# Patient Record
Sex: FEMALE | Race: WHITE | NOT HISPANIC OR LATINO | Employment: OTHER | URBAN - METROPOLITAN AREA
[De-identification: names, ages, dates, MRNs, and addresses within clinical notes are randomized per-mention and may not be internally consistent; named-entity substitution may affect disease eponyms.]

---

## 2019-04-11 ENCOUNTER — HOSPITAL ENCOUNTER (INPATIENT)
Facility: MEDICAL CENTER | Age: 79
LOS: 8 days | DRG: 813 | End: 2019-04-19
Attending: EMERGENCY MEDICINE | Admitting: HOSPITALIST
Payer: MEDICARE

## 2019-04-11 ENCOUNTER — APPOINTMENT (OUTPATIENT)
Dept: RADIOLOGY | Facility: MEDICAL CENTER | Age: 79
DRG: 813 | End: 2019-04-11
Attending: EMERGENCY MEDICINE
Payer: MEDICARE

## 2019-04-11 DIAGNOSIS — N17.9 AKI (ACUTE KIDNEY INJURY) (HCC): ICD-10-CM

## 2019-04-11 DIAGNOSIS — Z79.899 HIGH RISK MEDICATION USE: ICD-10-CM

## 2019-04-11 DIAGNOSIS — I48.91 ATRIAL FIBRILLATION WITH RAPID VENTRICULAR RESPONSE (HCC): ICD-10-CM

## 2019-04-11 DIAGNOSIS — K92.2 ACUTE GI BLEEDING: ICD-10-CM

## 2019-04-11 DIAGNOSIS — E87.6 HYPOKALEMIA: ICD-10-CM

## 2019-04-11 PROBLEM — J44.9 COPD (CHRONIC OBSTRUCTIVE PULMONARY DISEASE) (HCC): Status: ACTIVE | Noted: 2019-04-11

## 2019-04-11 PROBLEM — R79.89 ELEVATED TROPONIN: Status: ACTIVE | Noted: 2019-04-11

## 2019-04-11 PROBLEM — I10 HTN (HYPERTENSION): Status: ACTIVE | Noted: 2019-04-11

## 2019-04-11 PROBLEM — T78.40XA ALLERGIC REACTION: Status: ACTIVE | Noted: 2019-04-11

## 2019-04-11 PROBLEM — E87.5 HYPERKALEMIA: Status: ACTIVE | Noted: 2019-04-11

## 2019-04-11 PROBLEM — E03.9 HYPOTHYROIDISM: Status: ACTIVE | Noted: 2019-04-11

## 2019-04-11 PROBLEM — E78.5 HLD (HYPERLIPIDEMIA): Status: ACTIVE | Noted: 2019-04-11

## 2019-04-11 LAB
ALBUMIN SERPL BCP-MCNC: 3.6 G/DL (ref 3.2–4.9)
ALBUMIN/GLOB SERPL: 1.2 G/DL
ALP SERPL-CCNC: 67 U/L (ref 30–99)
ALT SERPL-CCNC: 16 U/L (ref 2–50)
ANION GAP SERPL CALC-SCNC: 9 MMOL/L (ref 0–11.9)
ANISOCYTOSIS BLD QL SMEAR: ABNORMAL
AST SERPL-CCNC: 19 U/L (ref 12–45)
BASOPHILS # BLD AUTO: 0 % (ref 0–1.8)
BASOPHILS # BLD: 0 K/UL (ref 0–0.12)
BILIRUB SERPL-MCNC: 0.7 MG/DL (ref 0.1–1.5)
BUN SERPL-MCNC: 98 MG/DL (ref 8–22)
CALCIUM SERPL-MCNC: 8.3 MG/DL (ref 8.5–10.5)
CHLORIDE SERPL-SCNC: 104 MMOL/L (ref 96–112)
CO2 SERPL-SCNC: 21 MMOL/L (ref 20–33)
CREAT SERPL-MCNC: 2.01 MG/DL (ref 0.5–1.4)
EKG IMPRESSION: NORMAL
EOSINOPHIL # BLD AUTO: 0 K/UL (ref 0–0.51)
EOSINOPHIL NFR BLD: 0 % (ref 0–6.9)
ERYTHROCYTE [DISTWIDTH] IN BLOOD BY AUTOMATED COUNT: 63.6 FL (ref 35.9–50)
GLOBULIN SER CALC-MCNC: 2.9 G/DL (ref 1.9–3.5)
GLUCOSE SERPL-MCNC: 115 MG/DL (ref 65–99)
HCT VFR BLD AUTO: 29.1 % (ref 37–47)
HGB BLD-MCNC: 8.9 G/DL (ref 12–16)
INR PPP: 2.23 (ref 0.87–1.13)
LACTATE BLD-SCNC: 2.2 MMOL/L (ref 0.5–2)
LYMPHOCYTES # BLD AUTO: 1.06 K/UL (ref 1–4.8)
LYMPHOCYTES NFR BLD: 8.8 % (ref 22–41)
MACROCYTES BLD QL SMEAR: ABNORMAL
MANUAL DIFF BLD: NORMAL
MCH RBC QN AUTO: 30.4 PG (ref 27–33)
MCHC RBC AUTO-ENTMCNC: 30.6 G/DL (ref 33.6–35)
MCV RBC AUTO: 99.3 FL (ref 81.4–97.8)
METAMYELOCYTES NFR BLD MANUAL: 0.9 %
MICROCYTES BLD QL SMEAR: ABNORMAL
MONOCYTES # BLD AUTO: 0.53 K/UL (ref 0–0.85)
MONOCYTES NFR BLD AUTO: 4.4 % (ref 0–13.4)
MORPHOLOGY BLD-IMP: NORMAL
MYELOCYTES NFR BLD MANUAL: 0.9 %
NEUTROPHILS # BLD AUTO: 10.29 K/UL (ref 2–7.15)
NEUTROPHILS NFR BLD: 85 % (ref 44–72)
NRBC # BLD AUTO: 0.16 K/UL
NRBC BLD-RTO: 1.3 /100 WBC
OVALOCYTES BLD QL SMEAR: NORMAL
PLATELET # BLD AUTO: 301 K/UL (ref 164–446)
PLATELET BLD QL SMEAR: NORMAL
PMV BLD AUTO: 10.7 FL (ref 9–12.9)
POIKILOCYTOSIS BLD QL SMEAR: NORMAL
POLYCHROMASIA BLD QL SMEAR: NORMAL
POTASSIUM SERPL-SCNC: 6.2 MMOL/L (ref 3.6–5.5)
PROT SERPL-MCNC: 6.5 G/DL (ref 6–8.2)
PROTHROMBIN TIME: 24.8 SEC (ref 12–14.6)
RBC # BLD AUTO: 2.93 M/UL (ref 4.2–5.4)
RBC BLD AUTO: PRESENT
SODIUM SERPL-SCNC: 134 MMOL/L (ref 135–145)
TROPONIN I SERPL-MCNC: 1 NG/ML (ref 0–0.04)
TROPONIN I SERPL-MCNC: 1.06 NG/ML (ref 0–0.04)
WBC # BLD AUTO: 12.1 K/UL (ref 4.8–10.8)

## 2019-04-11 PROCEDURE — 93005 ELECTROCARDIOGRAM TRACING: CPT | Performed by: EMERGENCY MEDICINE

## 2019-04-11 PROCEDURE — 700105 HCHG RX REV CODE 258: Performed by: EMERGENCY MEDICINE

## 2019-04-11 PROCEDURE — 85007 BL SMEAR W/DIFF WBC COUNT: CPT

## 2019-04-11 PROCEDURE — 99285 EMERGENCY DEPT VISIT HI MDM: CPT

## 2019-04-11 PROCEDURE — 94760 N-INVAS EAR/PLS OXIMETRY 1: CPT

## 2019-04-11 PROCEDURE — 80053 COMPREHEN METABOLIC PANEL: CPT

## 2019-04-11 PROCEDURE — 96365 THER/PROPH/DIAG IV INF INIT: CPT

## 2019-04-11 PROCEDURE — 700105 HCHG RX REV CODE 258: Performed by: HOSPITALIST

## 2019-04-11 PROCEDURE — 700101 HCHG RX REV CODE 250: Performed by: HOSPITALIST

## 2019-04-11 PROCEDURE — 700111 HCHG RX REV CODE 636 W/ 250 OVERRIDE (IP): Performed by: HOSPITALIST

## 2019-04-11 PROCEDURE — 83605 ASSAY OF LACTIC ACID: CPT

## 2019-04-11 PROCEDURE — 85610 PROTHROMBIN TIME: CPT

## 2019-04-11 PROCEDURE — 770020 HCHG ROOM/CARE - TELE (206)

## 2019-04-11 PROCEDURE — 80048 BASIC METABOLIC PNL TOTAL CA: CPT

## 2019-04-11 PROCEDURE — 85027 COMPLETE CBC AUTOMATED: CPT

## 2019-04-11 PROCEDURE — 93005 ELECTROCARDIOGRAM TRACING: CPT

## 2019-04-11 PROCEDURE — 700102 HCHG RX REV CODE 250 W/ 637 OVERRIDE(OP): Performed by: HOSPITALIST

## 2019-04-11 PROCEDURE — 36415 COLL VENOUS BLD VENIPUNCTURE: CPT

## 2019-04-11 PROCEDURE — 84484 ASSAY OF TROPONIN QUANT: CPT

## 2019-04-11 PROCEDURE — 700111 HCHG RX REV CODE 636 W/ 250 OVERRIDE (IP): Performed by: EMERGENCY MEDICINE

## 2019-04-11 PROCEDURE — 304561 HCHG STAT O2

## 2019-04-11 PROCEDURE — 96366 THER/PROPH/DIAG IV INF ADDON: CPT

## 2019-04-11 PROCEDURE — 99223 1ST HOSP IP/OBS HIGH 75: CPT | Mod: AI | Performed by: HOSPITALIST

## 2019-04-11 PROCEDURE — 74176 CT ABD & PELVIS W/O CONTRAST: CPT

## 2019-04-11 PROCEDURE — A9270 NON-COVERED ITEM OR SERVICE: HCPCS | Performed by: HOSPITALIST

## 2019-04-11 PROCEDURE — C9113 INJ PANTOPRAZOLE SODIUM, VIA: HCPCS | Performed by: EMERGENCY MEDICINE

## 2019-04-11 PROCEDURE — C9113 INJ PANTOPRAZOLE SODIUM, VIA: HCPCS | Performed by: HOSPITALIST

## 2019-04-11 RX ORDER — POTASSIUM CHLORIDE 20 MEQ/1
40 TABLET, EXTENDED RELEASE ORAL 2 TIMES DAILY
Status: ON HOLD | COMMUNITY
End: 2019-04-16

## 2019-04-11 RX ORDER — CALCIUM GLUCONATE 94 MG/ML
1 INJECTION, SOLUTION INTRAVENOUS ONCE
Status: DISCONTINUED | OUTPATIENT
Start: 2019-04-11 | End: 2019-04-11

## 2019-04-11 RX ORDER — FUROSEMIDE 20 MG/1
20 TABLET ORAL
Status: ON HOLD | COMMUNITY
End: 2019-04-16

## 2019-04-11 RX ORDER — FUROSEMIDE 10 MG/ML
10 INJECTION INTRAMUSCULAR; INTRAVENOUS ONCE
Status: COMPLETED | OUTPATIENT
Start: 2019-04-11 | End: 2019-04-11

## 2019-04-11 RX ORDER — POTASSIUM CHLORIDE 1.5 G/1.58G
10 POWDER, FOR SOLUTION ORAL PRN
COMMUNITY
End: 2019-04-11 | Stop reason: CLARIF

## 2019-04-11 RX ORDER — BUPROPION HYDROCHLORIDE 100 MG/1
200 TABLET ORAL
COMMUNITY
End: 2019-04-11 | Stop reason: CLARIF

## 2019-04-11 RX ORDER — OMEPRAZOLE 20 MG/1
20 CAPSULE, DELAYED RELEASE ORAL DAILY
COMMUNITY

## 2019-04-11 RX ORDER — DILTIAZEM HYDROCHLORIDE 60 MG/1
60 TABLET, FILM COATED ORAL EVERY 8 HOURS
Status: ON HOLD | COMMUNITY
End: 2019-04-16

## 2019-04-11 RX ORDER — MAGNESIUM OXIDE 400 MG/1
400 TABLET ORAL DAILY
Status: ON HOLD | COMMUNITY
End: 2019-04-16

## 2019-04-11 RX ORDER — DILTIAZEM HYDROCHLORIDE 5 MG/ML
0.25 INJECTION INTRAVENOUS ONCE
Status: COMPLETED | OUTPATIENT
Start: 2019-04-11 | End: 2019-04-11

## 2019-04-11 RX ORDER — BUPROPION HYDROCHLORIDE 200 MG/1
200 TABLET, EXTENDED RELEASE ORAL EVERY EVENING
COMMUNITY

## 2019-04-11 RX ORDER — LISINOPRIL 2.5 MG/1
2.5 TABLET ORAL DAILY
Status: ON HOLD | COMMUNITY
End: 2019-04-19

## 2019-04-11 RX ORDER — ALBUTEROL SULFATE 90 UG/1
2 AEROSOL, METERED RESPIRATORY (INHALATION) EVERY 4 HOURS PRN
Status: DISCONTINUED | OUTPATIENT
Start: 2019-04-11 | End: 2019-04-19 | Stop reason: HOSPADM

## 2019-04-11 RX ORDER — BUPROPION HYDROCHLORIDE 100 MG/1
200 TABLET, EXTENDED RELEASE ORAL 2 TIMES DAILY
Status: DISCONTINUED | OUTPATIENT
Start: 2019-04-11 | End: 2019-04-19 | Stop reason: HOSPADM

## 2019-04-11 RX ORDER — ALBUTEROL SULFATE 90 UG/1
2 AEROSOL, METERED RESPIRATORY (INHALATION) EVERY 6 HOURS PRN
COMMUNITY

## 2019-04-11 RX ORDER — LEVALBUTEROL INHALATION SOLUTION 0.63 MG/3ML
0.63 SOLUTION RESPIRATORY (INHALATION)
Status: DISCONTINUED | OUTPATIENT
Start: 2019-04-11 | End: 2019-04-19 | Stop reason: HOSPADM

## 2019-04-11 RX ORDER — HYDROCHLOROTHIAZIDE 25 MG/1
25 TABLET ORAL DAILY
Status: ON HOLD | COMMUNITY
End: 2019-04-16

## 2019-04-11 RX ORDER — ONDANSETRON 4 MG/1
4 TABLET, ORALLY DISINTEGRATING ORAL EVERY 4 HOURS PRN
Status: DISCONTINUED | OUTPATIENT
Start: 2019-04-11 | End: 2019-04-19 | Stop reason: HOSPADM

## 2019-04-11 RX ORDER — ONDANSETRON 2 MG/ML
4 INJECTION INTRAMUSCULAR; INTRAVENOUS EVERY 4 HOURS PRN
Status: DISCONTINUED | OUTPATIENT
Start: 2019-04-11 | End: 2019-04-19 | Stop reason: HOSPADM

## 2019-04-11 RX ORDER — IPRATROPIUM BROMIDE AND ALBUTEROL SULFATE 2.5; .5 MG/3ML; MG/3ML
3 SOLUTION RESPIRATORY (INHALATION) 4 TIMES DAILY
COMMUNITY

## 2019-04-11 RX ORDER — BUDESONIDE AND FORMOTEROL FUMARATE DIHYDRATE 80; 4.5 UG/1; UG/1
1 AEROSOL RESPIRATORY (INHALATION) 2 TIMES DAILY
Status: DISCONTINUED | OUTPATIENT
Start: 2019-04-11 | End: 2019-04-19 | Stop reason: HOSPADM

## 2019-04-11 RX ORDER — RANITIDINE 150 MG/1
150 TABLET ORAL 2 TIMES DAILY PRN
Status: ON HOLD | COMMUNITY
End: 2019-04-16

## 2019-04-11 RX ORDER — ATORVASTATIN CALCIUM 20 MG/1
20 TABLET, FILM COATED ORAL NIGHTLY
COMMUNITY

## 2019-04-11 RX ORDER — DOCUSATE SODIUM 100 MG/1
100 CAPSULE, LIQUID FILLED ORAL 2 TIMES DAILY PRN
COMMUNITY

## 2019-04-11 RX ORDER — SODIUM CHLORIDE, SODIUM LACTATE, POTASSIUM CHLORIDE, CALCIUM CHLORIDE 600; 310; 30; 20 MG/100ML; MG/100ML; MG/100ML; MG/100ML
INJECTION, SOLUTION INTRAVENOUS CONTINUOUS
Status: DISCONTINUED | OUTPATIENT
Start: 2019-04-11 | End: 2019-04-12

## 2019-04-11 RX ORDER — BUDESONIDE AND FORMOTEROL FUMARATE DIHYDRATE 80; 4.5 UG/1; UG/1
1 AEROSOL RESPIRATORY (INHALATION) 2 TIMES DAILY
COMMUNITY

## 2019-04-11 RX ORDER — DEXTROSE MONOHYDRATE 25 G/50ML
50 INJECTION, SOLUTION INTRAVENOUS ONCE
Status: COMPLETED | OUTPATIENT
Start: 2019-04-11 | End: 2019-04-11

## 2019-04-11 RX ORDER — BISACODYL 10 MG
10 SUPPOSITORY, RECTAL RECTAL
Status: DISCONTINUED | OUTPATIENT
Start: 2019-04-11 | End: 2019-04-19 | Stop reason: HOSPADM

## 2019-04-11 RX ORDER — HYDROCHLOROTHIAZIDE 25 MG/1
25 TABLET ORAL DAILY
Status: DISCONTINUED | OUTPATIENT
Start: 2019-04-12 | End: 2019-04-12

## 2019-04-11 RX ORDER — AMOXICILLIN 250 MG
2 CAPSULE ORAL 2 TIMES DAILY
Status: DISCONTINUED | OUTPATIENT
Start: 2019-04-11 | End: 2019-04-19 | Stop reason: HOSPADM

## 2019-04-11 RX ORDER — LEVOTHYROXINE SODIUM 0.05 MG/1
50 TABLET ORAL
Status: DISCONTINUED | OUTPATIENT
Start: 2019-04-12 | End: 2019-04-19 | Stop reason: HOSPADM

## 2019-04-11 RX ORDER — POLYETHYLENE GLYCOL 3350 17 G/17G
17 POWDER, FOR SOLUTION ORAL
COMMUNITY

## 2019-04-11 RX ORDER — POLYETHYLENE GLYCOL 3350 17 G/17G
1 POWDER, FOR SOLUTION ORAL
Status: DISCONTINUED | OUTPATIENT
Start: 2019-04-11 | End: 2019-04-19 | Stop reason: HOSPADM

## 2019-04-11 RX ORDER — ATORVASTATIN CALCIUM 20 MG/1
20 TABLET, FILM COATED ORAL NIGHTLY
Status: DISCONTINUED | OUTPATIENT
Start: 2019-04-11 | End: 2019-04-19 | Stop reason: HOSPADM

## 2019-04-11 RX ORDER — LEVOTHYROXINE SODIUM 0.05 MG/1
50 TABLET ORAL
COMMUNITY

## 2019-04-11 RX ADMIN — FUROSEMIDE 10 MG: 10 INJECTION, SOLUTION INTRAMUSCULAR; INTRAVENOUS at 21:11

## 2019-04-11 RX ADMIN — DILTIAZEM HYDROCHLORIDE 21.55 MG: 5 INJECTION INTRAVENOUS at 22:24

## 2019-04-11 RX ADMIN — SODIUM CHLORIDE, POTASSIUM CHLORIDE, SODIUM LACTATE AND CALCIUM CHLORIDE: 600; 310; 30; 20 INJECTION, SOLUTION INTRAVENOUS at 21:12

## 2019-04-11 RX ADMIN — SODIUM CHLORIDE 8 MG/HR: 9 INJECTION, SOLUTION INTRAVENOUS at 21:33

## 2019-04-11 RX ADMIN — SODIUM CHLORIDE 80 MG: 9 INJECTION, SOLUTION INTRAVENOUS at 21:14

## 2019-04-11 RX ADMIN — BUPROPION HYDROCHLORIDE 200 MG: 100 TABLET, FILM COATED, EXTENDED RELEASE ORAL at 22:04

## 2019-04-11 RX ADMIN — INSULIN HUMAN 5 UNITS: 100 INJECTION, SOLUTION PARENTERAL at 21:11

## 2019-04-11 RX ADMIN — SODIUM CHLORIDE 8 MG/HR: 9 INJECTION, SOLUTION INTRAVENOUS at 19:00

## 2019-04-11 RX ADMIN — CALCIUM GLUCONATE 1 G: 98 INJECTION, SOLUTION INTRAVENOUS at 21:14

## 2019-04-11 RX ADMIN — DEXTROSE MONOHYDRATE 50 ML: 25 INJECTION, SOLUTION INTRAVENOUS at 21:12

## 2019-04-11 RX ADMIN — ATORVASTATIN CALCIUM 20 MG: 20 TABLET, FILM COATED ORAL at 22:04

## 2019-04-11 RX ADMIN — BUDESONIDE AND FORMOTEROL FUMARATE DIHYDRATE 1 PUFF: 80; 4.5 AEROSOL RESPIRATORY (INHALATION) at 22:04

## 2019-04-11 ASSESSMENT — LIFESTYLE VARIABLES
DO YOU DRINK ALCOHOL: NO
EVER_SMOKED: YES

## 2019-04-11 ASSESSMENT — ENCOUNTER SYMPTOMS
DEPRESSION: 0
EYES NEGATIVE: 1
SPUTUM PRODUCTION: 0
NAUSEA: 0
VOMITING: 0
DIZZINESS: 0
FEVER: 0
MUSCULOSKELETAL NEGATIVE: 1
ORTHOPNEA: 1
ABDOMINAL PAIN: 0
TINGLING: 0
HEARTBURN: 0
CHILLS: 0
PSYCHIATRIC NEGATIVE: 1
FOCAL WEAKNESS: 0
PHOTOPHOBIA: 0
CONSTIPATION: 0
WHEEZING: 0
MYALGIAS: 0
DIARRHEA: 0
SHORTNESS OF BREATH: 0
HEMOPTYSIS: 0
PALPITATIONS: 0
HEADACHES: 0
SORE THROAT: 0
BLOOD IN STOOL: 0
NAUSEA: 1
SHORTNESS OF BREATH: 1
WEAKNESS: 1
COUGH: 0

## 2019-04-11 ASSESSMENT — COPD QUESTIONNAIRES
DO YOU EVER COUGH UP ANY MUCUS OR PHLEGM?: NO/ONLY WITH OCCASIONAL COLDS OR INFECTIONS
COPD SCREENING SCORE: 4
HAVE YOU SMOKED AT LEAST 100 CIGARETTES IN YOUR ENTIRE LIFE: YES
DURING THE PAST 4 WEEKS HOW MUCH DID YOU FEEL SHORT OF BREATH: NONE/LITTLE OF THE TIME

## 2019-04-12 ENCOUNTER — ANESTHESIA (OUTPATIENT)
Dept: SURGERY | Facility: MEDICAL CENTER | Age: 79
DRG: 813 | End: 2019-04-12
Payer: MEDICARE

## 2019-04-12 ENCOUNTER — APPOINTMENT (OUTPATIENT)
Dept: CARDIOLOGY | Facility: MEDICAL CENTER | Age: 79
DRG: 813 | End: 2019-04-12
Attending: INTERNAL MEDICINE
Payer: MEDICARE

## 2019-04-12 ENCOUNTER — ANESTHESIA EVENT (OUTPATIENT)
Dept: SURGERY | Facility: MEDICAL CENTER | Age: 79
DRG: 813 | End: 2019-04-12
Payer: MEDICARE

## 2019-04-12 ENCOUNTER — APPOINTMENT (OUTPATIENT)
Dept: RADIOLOGY | Facility: MEDICAL CENTER | Age: 79
DRG: 813 | End: 2019-04-12
Attending: INTERNAL MEDICINE
Payer: MEDICARE

## 2019-04-12 PROBLEM — D62 ACUTE BLOOD LOSS ANEMIA: Status: ACTIVE | Noted: 2019-04-12

## 2019-04-12 PROBLEM — D68.32 HEMORRHAGIC DISORDER DUE TO EXTRINSIC CIRCULATING ANTICOAGULANTS (HCC): Status: ACTIVE | Noted: 2019-04-12

## 2019-04-12 PROBLEM — E66.9 OBESITY: Status: ACTIVE | Noted: 2019-04-12

## 2019-04-12 LAB
ABO GROUP BLD: NORMAL
ANION GAP SERPL CALC-SCNC: 10 MMOL/L (ref 0–11.9)
BARCODED ABORH UBTYP: 6200
BARCODED ABORH UBTYP: 8400
BARCODED PRD CODE UBPRD: NORMAL
BARCODED PRD CODE UBPRD: NORMAL
BARCODED UNIT NUM UBUNT: NORMAL
BARCODED UNIT NUM UBUNT: NORMAL
BUN SERPL-MCNC: 93 MG/DL (ref 8–22)
CALCIUM SERPL-MCNC: 8.7 MG/DL (ref 8.5–10.5)
CHLORIDE SERPL-SCNC: 105 MMOL/L (ref 96–112)
CHLORIDE UR-SCNC: 42 MMOL/L
CO2 SERPL-SCNC: 20 MMOL/L (ref 20–33)
COMPONENT FT 8504FT: NORMAL
COMPONENT FT 8504FT: NORMAL
CREAT SERPL-MCNC: 2.05 MG/DL (ref 0.5–1.4)
CREAT UR-MCNC: 70.3 MG/DL
DIGOXIN SERPL-MCNC: 1.1 NG/ML (ref 0.8–2)
GLUCOSE SERPL-MCNC: 106 MG/DL (ref 65–99)
HGB BLD-MCNC: 7.7 G/DL (ref 12–16)
HGB BLD-MCNC: 8.1 G/DL (ref 12–16)
HGB BLD-MCNC: 8.3 G/DL (ref 12–16)
LV EJECT FRACT  99904: 25
LV EJECT FRACT MOD 2C 99903: 33.03
LV EJECT FRACT MOD 4C 99902: 43.32
LV EJECT FRACT MOD BP 99901: 36.79
POTASSIUM SERPL-SCNC: 5 MMOL/L (ref 3.6–5.5)
POTASSIUM SERPL-SCNC: 5.5 MMOL/L (ref 3.6–5.5)
POTASSIUM SERPL-SCNC: 5.6 MMOL/L (ref 3.6–5.5)
POTASSIUM UR-SCNC: 70.1 MMOL/L
PRODUCT TYPE UPROD: NORMAL
PRODUCT TYPE UPROD: NORMAL
PROT UR-MCNC: 14 MG/DL (ref 0–15)
RH BLD: NORMAL
SODIUM SERPL-SCNC: 135 MMOL/L (ref 135–145)
SODIUM UR-SCNC: 15 MMOL/L
TROPONIN I SERPL-MCNC: 0.97 NG/ML (ref 0–0.04)
TSH SERPL DL<=0.005 MIU/L-ACNC: 2.89 UIU/ML (ref 0.38–5.33)
UNIT STATUS USTAT: NORMAL
UNIT STATUS USTAT: NORMAL

## 2019-04-12 PROCEDURE — 770020 HCHG ROOM/CARE - TELE (206)

## 2019-04-12 PROCEDURE — 700111 HCHG RX REV CODE 636 W/ 250 OVERRIDE (IP): Performed by: INTERNAL MEDICINE

## 2019-04-12 PROCEDURE — 93306 TTE W/DOPPLER COMPLETE: CPT

## 2019-04-12 PROCEDURE — 700102 HCHG RX REV CODE 250 W/ 637 OVERRIDE(OP): Performed by: HOSPITALIST

## 2019-04-12 PROCEDURE — 160029 HCHG SURGERY MINUTES - 1ST 30 MINS LEVEL 4: Performed by: INTERNAL MEDICINE

## 2019-04-12 PROCEDURE — C9113 INJ PANTOPRAZOLE SODIUM, VIA: HCPCS | Performed by: HOSPITALIST

## 2019-04-12 PROCEDURE — 500066 HCHG BITE BLOCK, ECT: Performed by: INTERNAL MEDICINE

## 2019-04-12 PROCEDURE — 0DJD8ZZ INSPECTION OF LOWER INTESTINAL TRACT, VIA NATURAL OR ARTIFICIAL OPENING ENDOSCOPIC: ICD-10-PCS | Performed by: INTERNAL MEDICINE

## 2019-04-12 PROCEDURE — 700101 HCHG RX REV CODE 250: Performed by: ANESTHESIOLOGY

## 2019-04-12 PROCEDURE — 84443 ASSAY THYROID STIM HORMONE: CPT

## 2019-04-12 PROCEDURE — 160035 HCHG PACU - 1ST 60 MINS PHASE I: Performed by: INTERNAL MEDICINE

## 2019-04-12 PROCEDURE — 160048 HCHG OR STATISTICAL LEVEL 1-5: Performed by: INTERNAL MEDICINE

## 2019-04-12 PROCEDURE — 160036 HCHG PACU - EA ADDL 30 MINS PHASE I: Performed by: INTERNAL MEDICINE

## 2019-04-12 PROCEDURE — A9270 NON-COVERED ITEM OR SERVICE: HCPCS | Performed by: NURSE PRACTITIONER

## 2019-04-12 PROCEDURE — 700111 HCHG RX REV CODE 636 W/ 250 OVERRIDE (IP): Performed by: HOSPITALIST

## 2019-04-12 PROCEDURE — 86901 BLOOD TYPING SEROLOGIC RH(D): CPT

## 2019-04-12 PROCEDURE — 99222 1ST HOSP IP/OBS MODERATE 55: CPT | Performed by: INTERNAL MEDICINE

## 2019-04-12 PROCEDURE — 84484 ASSAY OF TROPONIN QUANT: CPT

## 2019-04-12 PROCEDURE — 160009 HCHG ANES TIME/MIN: Performed by: INTERNAL MEDICINE

## 2019-04-12 PROCEDURE — 30233L1 TRANSFUSION OF NONAUTOLOGOUS FRESH PLASMA INTO PERIPHERAL VEIN, PERCUTANEOUS APPROACH: ICD-10-PCS | Performed by: INTERNAL MEDICINE

## 2019-04-12 PROCEDURE — 36430 TRANSFUSION BLD/BLD COMPNT: CPT

## 2019-04-12 PROCEDURE — 80162 ASSAY OF DIGOXIN TOTAL: CPT

## 2019-04-12 PROCEDURE — 306589 SLEEVE,VASO CALF LARGE: Performed by: HOSPITALIST

## 2019-04-12 PROCEDURE — 700105 HCHG RX REV CODE 258: Performed by: INTERNAL MEDICINE

## 2019-04-12 PROCEDURE — 85018 HEMOGLOBIN: CPT | Mod: 91

## 2019-04-12 PROCEDURE — C9113 INJ PANTOPRAZOLE SODIUM, VIA: HCPCS | Performed by: INTERNAL MEDICINE

## 2019-04-12 PROCEDURE — 82570 ASSAY OF URINE CREATININE: CPT

## 2019-04-12 PROCEDURE — 88305 TISSUE EXAM BY PATHOLOGIST: CPT

## 2019-04-12 PROCEDURE — 99233 SBSQ HOSP IP/OBS HIGH 50: CPT | Performed by: INTERNAL MEDICINE

## 2019-04-12 PROCEDURE — 700105 HCHG RX REV CODE 258

## 2019-04-12 PROCEDURE — 36415 COLL VENOUS BLD VENIPUNCTURE: CPT

## 2019-04-12 PROCEDURE — 0DB68ZX EXCISION OF STOMACH, VIA NATURAL OR ARTIFICIAL OPENING ENDOSCOPIC, DIAGNOSTIC: ICD-10-PCS | Performed by: INTERNAL MEDICINE

## 2019-04-12 PROCEDURE — 84132 ASSAY OF SERUM POTASSIUM: CPT

## 2019-04-12 PROCEDURE — 84156 ASSAY OF PROTEIN URINE: CPT

## 2019-04-12 PROCEDURE — 700101 HCHG RX REV CODE 250: Performed by: HOSPITALIST

## 2019-04-12 PROCEDURE — 82436 ASSAY OF URINE CHLORIDE: CPT

## 2019-04-12 PROCEDURE — 71045 X-RAY EXAM CHEST 1 VIEW: CPT

## 2019-04-12 PROCEDURE — 700105 HCHG RX REV CODE 258: Performed by: HOSPITALIST

## 2019-04-12 PROCEDURE — 160002 HCHG RECOVERY MINUTES (STAT): Performed by: INTERNAL MEDICINE

## 2019-04-12 PROCEDURE — 86900 BLOOD TYPING SEROLOGIC ABO: CPT

## 2019-04-12 PROCEDURE — 93306 TTE W/DOPPLER COMPLETE: CPT | Mod: 26 | Performed by: INTERNAL MEDICINE

## 2019-04-12 PROCEDURE — 0DB98ZX EXCISION OF DUODENUM, VIA NATURAL OR ARTIFICIAL OPENING ENDOSCOPIC, DIAGNOSTIC: ICD-10-PCS | Performed by: INTERNAL MEDICINE

## 2019-04-12 PROCEDURE — P9017 PLASMA 1 DONOR FRZ W/IN 8 HR: HCPCS | Mod: 91

## 2019-04-12 PROCEDURE — 700111 HCHG RX REV CODE 636 W/ 250 OVERRIDE (IP): Performed by: ANESTHESIOLOGY

## 2019-04-12 PROCEDURE — A9270 NON-COVERED ITEM OR SERVICE: HCPCS | Performed by: HOSPITALIST

## 2019-04-12 PROCEDURE — 84300 ASSAY OF URINE SODIUM: CPT

## 2019-04-12 PROCEDURE — 84133 ASSAY OF URINE POTASSIUM: CPT

## 2019-04-12 PROCEDURE — 700102 HCHG RX REV CODE 250 W/ 637 OVERRIDE(OP): Performed by: NURSE PRACTITIONER

## 2019-04-12 PROCEDURE — 88312 SPECIAL STAINS GROUP 1: CPT

## 2019-04-12 PROCEDURE — 94664 DEMO&/EVAL PT USE INHALER: CPT

## 2019-04-12 RX ORDER — HYDRALAZINE HYDROCHLORIDE 20 MG/ML
10 INJECTION INTRAMUSCULAR; INTRAVENOUS EVERY 6 HOURS PRN
Status: DISCONTINUED | OUTPATIENT
Start: 2019-04-12 | End: 2019-04-19 | Stop reason: HOSPADM

## 2019-04-12 RX ORDER — HYDROMORPHONE HYDROCHLORIDE 1 MG/ML
0.2 INJECTION, SOLUTION INTRAMUSCULAR; INTRAVENOUS; SUBCUTANEOUS
Status: DISCONTINUED | OUTPATIENT
Start: 2019-04-12 | End: 2019-04-12 | Stop reason: HOSPADM

## 2019-04-12 RX ORDER — DIGOXIN 0.25 MG/ML
250 INJECTION INTRAMUSCULAR; INTRAVENOUS
Status: COMPLETED | OUTPATIENT
Start: 2019-04-12 | End: 2019-04-12

## 2019-04-12 RX ORDER — OXYCODONE HCL 5 MG/5 ML
10 SOLUTION, ORAL ORAL
Status: DISCONTINUED | OUTPATIENT
Start: 2019-04-12 | End: 2019-04-12 | Stop reason: HOSPADM

## 2019-04-12 RX ORDER — DIGOXIN 125 MCG
250 TABLET ORAL EVERY 6 HOURS
Status: DISCONTINUED | OUTPATIENT
Start: 2019-04-12 | End: 2019-04-12

## 2019-04-12 RX ORDER — DIPHENHYDRAMINE HYDROCHLORIDE 50 MG/ML
12.5 INJECTION INTRAMUSCULAR; INTRAVENOUS
Status: DISCONTINUED | OUTPATIENT
Start: 2019-04-12 | End: 2019-04-12

## 2019-04-12 RX ORDER — SODIUM CHLORIDE, SODIUM GLUCONATE, SODIUM ACETATE, POTASSIUM CHLORIDE AND MAGNESIUM CHLORIDE 526; 502; 368; 37; 30 MG/100ML; MG/100ML; MG/100ML; MG/100ML; MG/100ML
500 INJECTION, SOLUTION INTRAVENOUS CONTINUOUS
Status: DISCONTINUED | OUTPATIENT
Start: 2019-04-12 | End: 2019-04-12

## 2019-04-12 RX ORDER — ETOMIDATE 2 MG/ML
INJECTION INTRAVENOUS PRN
Status: DISCONTINUED | OUTPATIENT
Start: 2019-04-12 | End: 2019-04-12 | Stop reason: SURG

## 2019-04-12 RX ORDER — HYDROMORPHONE HYDROCHLORIDE 1 MG/ML
0.4 INJECTION, SOLUTION INTRAMUSCULAR; INTRAVENOUS; SUBCUTANEOUS
Status: DISCONTINUED | OUTPATIENT
Start: 2019-04-12 | End: 2019-04-12 | Stop reason: HOSPADM

## 2019-04-12 RX ORDER — IPRATROPIUM BROMIDE AND ALBUTEROL SULFATE 2.5; .5 MG/3ML; MG/3ML
3 SOLUTION RESPIRATORY (INHALATION)
Status: DISCONTINUED | OUTPATIENT
Start: 2019-04-12 | End: 2019-04-12

## 2019-04-12 RX ORDER — HYDROMORPHONE HYDROCHLORIDE 1 MG/ML
1 INJECTION, SOLUTION INTRAMUSCULAR; INTRAVENOUS; SUBCUTANEOUS
Status: DISCONTINUED | OUTPATIENT
Start: 2019-04-12 | End: 2019-04-12 | Stop reason: HOSPADM

## 2019-04-12 RX ORDER — MIDAZOLAM HYDROCHLORIDE 1 MG/ML
1 INJECTION INTRAMUSCULAR; INTRAVENOUS
Status: DISCONTINUED | OUTPATIENT
Start: 2019-04-12 | End: 2019-04-12

## 2019-04-12 RX ORDER — SODIUM CHLORIDE 9 MG/ML
INJECTION, SOLUTION INTRAVENOUS
Status: COMPLETED
Start: 2019-04-12 | End: 2019-04-12

## 2019-04-12 RX ORDER — METOPROLOL TARTRATE 1 MG/ML
5 INJECTION, SOLUTION INTRAVENOUS ONCE
Status: COMPLETED | OUTPATIENT
Start: 2019-04-12 | End: 2019-04-12

## 2019-04-12 RX ORDER — DIGOXIN 125 MCG
125 TABLET ORAL DAILY
Status: DISCONTINUED | OUTPATIENT
Start: 2019-04-13 | End: 2019-04-12

## 2019-04-12 RX ORDER — PHENYLEPHRINE HYDROCHLORIDE 10 MG/ML
INJECTION, SOLUTION INTRAMUSCULAR; INTRAVENOUS; SUBCUTANEOUS PRN
Status: DISCONTINUED | OUTPATIENT
Start: 2019-04-12 | End: 2019-04-12 | Stop reason: SURG

## 2019-04-12 RX ORDER — ONDANSETRON 2 MG/ML
4 INJECTION INTRAMUSCULAR; INTRAVENOUS
Status: DISCONTINUED | OUTPATIENT
Start: 2019-04-12 | End: 2019-04-12

## 2019-04-12 RX ORDER — MEPERIDINE HYDROCHLORIDE 50 MG/ML
12.5 INJECTION INTRAMUSCULAR; INTRAVENOUS; SUBCUTANEOUS
Status: DISCONTINUED | OUTPATIENT
Start: 2019-04-12 | End: 2019-04-12

## 2019-04-12 RX ORDER — OXYCODONE HCL 5 MG/5 ML
5 SOLUTION, ORAL ORAL
Status: DISCONTINUED | OUTPATIENT
Start: 2019-04-12 | End: 2019-04-12 | Stop reason: HOSPADM

## 2019-04-12 RX ORDER — PANTOPRAZOLE SODIUM 40 MG/10ML
40 INJECTION, POWDER, LYOPHILIZED, FOR SOLUTION INTRAVENOUS DAILY
Status: DISCONTINUED | OUTPATIENT
Start: 2019-04-12 | End: 2019-04-13

## 2019-04-12 RX ORDER — SODIUM CHLORIDE 9 MG/ML
INJECTION, SOLUTION INTRAVENOUS CONTINUOUS
Status: DISCONTINUED | OUTPATIENT
Start: 2019-04-12 | End: 2019-04-14

## 2019-04-12 RX ORDER — HALOPERIDOL 5 MG/ML
1 INJECTION INTRAMUSCULAR
Status: DISCONTINUED | OUTPATIENT
Start: 2019-04-12 | End: 2019-04-12

## 2019-04-12 RX ORDER — CALCIUM CHLORIDE 100 MG/ML
INJECTION INTRAVENOUS; INTRAVENTRICULAR PRN
Status: DISCONTINUED | OUTPATIENT
Start: 2019-04-12 | End: 2019-04-12 | Stop reason: SURG

## 2019-04-12 RX ADMIN — ATORVASTATIN CALCIUM 20 MG: 20 TABLET, FILM COATED ORAL at 20:37

## 2019-04-12 RX ADMIN — SODIUM CHLORIDE: 9 INJECTION, SOLUTION INTRAVENOUS at 09:20

## 2019-04-12 RX ADMIN — PROPOFOL 20 MG: 10 INJECTION, EMULSION INTRAVENOUS at 16:18

## 2019-04-12 RX ADMIN — DIGOXIN 250 MCG: 125 TABLET ORAL at 02:09

## 2019-04-12 RX ADMIN — BUDESONIDE AND FORMOTEROL FUMARATE DIHYDRATE 1 PUFF: 80; 4.5 AEROSOL RESPIRATORY (INHALATION) at 18:20

## 2019-04-12 RX ADMIN — DIGOXIN 250 MCG: 0.25 INJECTION INTRAMUSCULAR; INTRAVENOUS at 14:23

## 2019-04-12 RX ADMIN — SODIUM CHLORIDE 8 MG/HR: 9 INJECTION, SOLUTION INTRAVENOUS at 07:30

## 2019-04-12 RX ADMIN — SODIUM CHLORIDE: 9 INJECTION, SOLUTION INTRAVENOUS at 13:45

## 2019-04-12 RX ADMIN — LIDOCAINE HYDROCHLORIDE 30 MG: 20 INJECTION, SOLUTION INFILTRATION; PERINEURAL at 16:18

## 2019-04-12 RX ADMIN — ONDANSETRON 4 MG: 2 INJECTION INTRAMUSCULAR; INTRAVENOUS at 00:43

## 2019-04-12 RX ADMIN — DIGOXIN 250 MCG: 0.25 INJECTION INTRAMUSCULAR; INTRAVENOUS at 10:56

## 2019-04-12 RX ADMIN — LEVOTHYROXINE SODIUM 50 MCG: 50 TABLET ORAL at 05:01

## 2019-04-12 RX ADMIN — SENNOSIDES, DOCUSATE SODIUM 2 TABLET: 50; 8.6 TABLET, FILM COATED ORAL at 18:19

## 2019-04-12 RX ADMIN — DILTIAZEM HYDROCHLORIDE 60 MG: 30 TABLET, FILM COATED ORAL at 10:56

## 2019-04-12 RX ADMIN — BUPROPION HYDROCHLORIDE 200 MG: 100 TABLET, FILM COATED, EXTENDED RELEASE ORAL at 05:01

## 2019-04-12 RX ADMIN — PANTOPRAZOLE SODIUM 40 MG: 40 INJECTION, POWDER, LYOPHILIZED, FOR SOLUTION INTRAVENOUS at 19:41

## 2019-04-12 RX ADMIN — BUDESONIDE AND FORMOTEROL FUMARATE DIHYDRATE 1 PUFF: 80; 4.5 AEROSOL RESPIRATORY (INHALATION) at 05:01

## 2019-04-12 RX ADMIN — SODIUM CHLORIDE: 9 INJECTION, SOLUTION INTRAVENOUS at 16:09

## 2019-04-12 RX ADMIN — CALCIUM CHLORIDE 500 MG: 100 INJECTION, SOLUTION INTRAVENOUS at 16:19

## 2019-04-12 RX ADMIN — ETOMIDATE 5 MG: 2 INJECTION INTRAVENOUS at 16:18

## 2019-04-12 RX ADMIN — METOPROLOL TARTRATE 5 MG: 1 INJECTION, SOLUTION INTRAVENOUS at 00:32

## 2019-04-12 RX ADMIN — PHENYLEPHRINE HYDROCHLORIDE 100 MCG: 10 INJECTION INTRAVENOUS at 16:19

## 2019-04-12 RX ADMIN — BUPROPION HYDROCHLORIDE 200 MG: 100 TABLET, FILM COATED, EXTENDED RELEASE ORAL at 18:19

## 2019-04-12 RX ADMIN — PROPOFOL 20 MG: 10 INJECTION, EMULSION INTRAVENOUS at 16:22

## 2019-04-12 ASSESSMENT — COGNITIVE AND FUNCTIONAL STATUS - GENERAL
TOILETING: A LITTLE
HELP NEEDED FOR BATHING: A LITTLE
STANDING UP FROM CHAIR USING ARMS: A LOT
DAILY ACTIVITIY SCORE: 18
DRESSING REGULAR UPPER BODY CLOTHING: A LITTLE
MOVING FROM LYING ON BACK TO SITTING ON SIDE OF FLAT BED: A LITTLE
SUGGESTED CMS G CODE MODIFIER DAILY ACTIVITY: CK
EATING MEALS: A LITTLE
DRESSING REGULAR LOWER BODY CLOTHING: A LITTLE
CLIMB 3 TO 5 STEPS WITH RAILING: A LOT
WALKING IN HOSPITAL ROOM: A LOT
TURNING FROM BACK TO SIDE WHILE IN FLAT BAD: A LITTLE
PERSONAL GROOMING: A LITTLE
SUGGESTED CMS G CODE MODIFIER MOBILITY: CL
MOVING TO AND FROM BED TO CHAIR: A LOT
MOBILITY SCORE: 14

## 2019-04-12 ASSESSMENT — ENCOUNTER SYMPTOMS
VOMITING: 0
CHOKING: 0
ANAL BLEEDING: 0
CHEST TIGHTNESS: 0
BRUISES/BLEEDS EASILY: 0
AGITATION: 0
ABDOMINAL PAIN: 0
EYE DISCHARGE: 0
APNEA: 0
APPETITE CHANGE: 0
ADENOPATHY: 0
ARTHRALGIAS: 0
ACTIVITY CHANGE: 0
BACK PAIN: 0
FATIGUE: 0
EYE ITCHING: 0
DIZZINESS: 0
COLOR CHANGE: 0
CONSTIPATION: 0
ABDOMINAL DISTENTION: 0
WEAKNESS: 0
WHEEZING: 0
RECTAL PAIN: 0
CONFUSION: 0
STRIDOR: 0
DIARRHEA: 0
PALPITATIONS: 0
NAUSEA: 0

## 2019-04-12 ASSESSMENT — PAIN SCALES - GENERAL: PAIN_LEVEL: 0

## 2019-04-12 ASSESSMENT — PATIENT HEALTH QUESTIONNAIRE - PHQ9
2. FEELING DOWN, DEPRESSED, IRRITABLE, OR HOPELESS: NOT AT ALL
1. LITTLE INTEREST OR PLEASURE IN DOING THINGS: NOT AT ALL
SUM OF ALL RESPONSES TO PHQ9 QUESTIONS 1 AND 2: 0

## 2019-04-12 NOTE — CONSULTS
Cardiology Initial Consultation    Date of Service  4/12/2019    Referring Physician  Yanick Wade M.D.    Reason for Consultation  Atrial fibrillation, abnormal biomarkers    History of Presenting Illness  Rhoda Gaines is a 78 y.o. female from [place] with no cardiac history that she knows of with a past medical history of morbid obesity who presented 4/11/2019 with recent discharge for what the chart says his heart failure and atrial fibrillation.  She herself denies knowledge of these conditions but states she was in the hospital and they may or may not have given her medications for her heart.    She is a poor historian  We have no records from there    Endorses black stools prior  Seen by gastroenterology here, they are looking for clearance to safely perform an upper endoscopy  She has been tachycardic in atrial fibrillation since admission here, kidney function poor  Oral diltiazem not given this morning    Review of Systems  Review of Systems   Constitutional: Negative for activity change, appetite change and fatigue.   HENT: Negative for congestion and dental problem.    Eyes: Negative for discharge and itching.   Respiratory: Negative for apnea, choking, chest tightness, wheezing and stridor.    Cardiovascular: Negative for chest pain, palpitations and leg swelling.   Gastrointestinal: Negative for abdominal distention, abdominal pain, anal bleeding, constipation, diarrhea, nausea, rectal pain and vomiting.   Endocrine: Negative for cold intolerance and heat intolerance.   Genitourinary: Negative for difficulty urinating and dysuria.   Musculoskeletal: Negative for arthralgias and back pain.   Skin: Negative for color change and pallor.   Allergic/Immunologic: Negative for environmental allergies and food allergies.   Neurological: Negative for dizziness and weakness.   Hematological: Negative for adenopathy. Does not bruise/bleed easily.   Psychiatric/Behavioral: Negative for agitation and confusion.    All other systems reviewed and are negative.      Past Medical History   has a past medical history of A-fib (HCC); Chronic obstructive pulmonary disease (HCC); Hypertension; Psychiatric disorder; and Thyroid disease.    Surgical History   has a past surgical history that includes other orthopedic surgery.    Family History  family history is not on file.    Social History   reports that she has quit smoking. She has never used smokeless tobacco. She reports that she does not drink alcohol or use drugs.    Medications  Prior to Admission Medications   Prescriptions Last Dose Informant Patient Reported? Taking?   DILTIAZem (CARDIZEM) 60 MG Tab 4/11/2019 at AM Patient's Home Pharmacy Yes Yes   Sig: Take 60 mg by mouth every 8 hours.   albuterol 108 (90 Base) MCG/ACT Aero Soln inhalation aerosol UNK at PRN Patient's Home Pharmacy Yes Yes   Sig: Inhale 2 Puffs by mouth every 6 hours as needed for Shortness of Breath.   apixaban (ELIQUIS) 5mg Tab 4/11/2019 at AM Patient's Home Pharmacy Yes Yes   Sig: Take 5 mg by mouth 2 Times a Day.   aspirin EC (ECOTRIN) 81 MG Tablet Delayed Response 4/11/2019 at AM Patient's Home Pharmacy Yes Yes   Sig: Take 81 mg by mouth every day.   atorvastatin (LIPITOR) 20 MG Tab 4/10/2019 at PM Patient's Home Pharmacy Yes Yes   Sig: Take 20 mg by mouth every evening.   buPROPion (WELLBUTRIN SR) 200 MG SR tablet 4/11/2019 at AM Patient's Home Pharmacy Yes Yes   Sig: Take 200 mg by mouth 2 times a day.   budesonide-formoterol (SYMBICORT) 80-4.5 MCG/ACT Aerosol 4/11/2019 at AM Patient's Home Pharmacy Yes Yes   Sig: Inhale 1 Puff by mouth 2 Times a Day.   diclofenac EC (VOLTAREN) 50 MG Tablet Delayed Response UNK at PRN Patient's Home Pharmacy Yes Yes   Sig: Take 50 mg by mouth 1 time daily as needed (Pain).   docusate sodium (COLACE) 100 MG Cap <week at PRN Patient's Home Pharmacy Yes Yes   Sig: Take 100 mg by mouth 2 times a day as needed for Constipation.   furosemide (LASIX) 20 MG Tab  4/11/2019 at AM Patient's Home Pharmacy Yes Yes   Sig: Take 20 mg by mouth 1 time daily as needed (Fluid Retention).   hydroCHLOROthiazide (HYDRODIURIL) 25 MG Tab 4/11/2019 at AM Patient's Home Pharmacy Yes Yes   Sig: Take 25 mg by mouth every day.   ipratropium-albuterol (DUONEB) 0.5-2.5 (3) MG/3ML nebulizer solution 4/11/2019 at AM Patient's Home Pharmacy Yes Yes   Sig: 3 mL by Nebulization route 4 times a day.   levothyroxine (SYNTHROID) 50 MCG Tab 4/11/2019 at AM Patient's Home Pharmacy Yes Yes   Sig: Take 50 mcg by mouth Every morning on an empty stomach.   lisinopril (PRINIVIL) 2.5 MG Tab 4/11/2019 at AM Patient's Home Pharmacy Yes Yes   Sig: Take 2.5 mg by mouth every day.   magnesium oxide (MAG-OX) 400 MG Tab 4/11/2019 at AM Patient's Home Pharmacy Yes Yes   Sig: Take 400 mg by mouth every day.   omeprazole (PRILOSEC) 20 MG delayed-release capsule 4/11/2019 at AM Patient's Home Pharmacy Yes Yes   Sig: Take 20 mg by mouth every day.   polyethylene glycol/lytes (MIRALAX) Pack UNK at PRN Patient's Home Pharmacy Yes Yes   Sig: Take 17 g by mouth 1 time daily as needed (Constipation).   potassium chloride SA (KDUR) 20 MEQ Tab CR 4/11/2019 at AM Patient's Home Pharmacy Yes Yes   Sig: Take 40 mEq by mouth 2 times a day.   raNITidine (ZANTAC) 150 MG Tab UNK at PRN Patient's Home Pharmacy Yes Yes   Sig: Take 150 mg by mouth 2 times a day as needed for Heartburn.      Facility-Administered Medications: None       Allergies  No Known Allergies    Vital signs in last 24 hours  Temp:  [36.1 °C (96.9 °F)-36.3 °C (97.3 °F)] 36.1 °C (96.9 °F)  Pulse:  [108-145] 136  Resp:  [16-28] 19  BP: ()/(56-78) 109/76  SpO2:  [93 %-100 %] 98 %    Physical Exam  Physical Exam   Constitutional: She is oriented to person, place, and time. No distress.   Morbidly obese, poorly fitting dentures, lying flat in bed talkative   HENT:   Head: Normocephalic and atraumatic.   Eyes: Pupils are equal, round, and reactive to light. EOM are  normal. No scleral icterus.   Neck: No JVD present. No thyromegaly present.   Cardiovascular: Intact distal pulses.  Exam reveals no gallop and no friction rub.    Rapid atrial fibrillation in the 120s   Pulmonary/Chest: No respiratory distress.   Abdominal: Soft. Bowel sounds are normal. There is no tenderness.   Musculoskeletal: She exhibits no edema or tenderness.   Lymphadenopathy:     She has no cervical adenopathy.   Neurological: She is alert and oriented to person, place, and time. She exhibits normal muscle tone.   Skin: Skin is warm and dry. She is not diaphoretic.   Psychiatric: She has a normal mood and affect. Her behavior is normal.       Lab Review  Lab Results   Component Value Date/Time    WBC 12.1 (H) 04/11/2019 06:15 PM    RBC 2.93 (L) 04/11/2019 06:15 PM    HEMOGLOBIN 8.1 (L) 04/12/2019 11:48 AM    HEMATOCRIT 29.1 (L) 04/11/2019 06:15 PM    MCV 99.3 (H) 04/11/2019 06:15 PM    MCH 30.4 04/11/2019 06:15 PM    MCHC 30.6 (L) 04/11/2019 06:15 PM    MPV 10.7 04/11/2019 06:15 PM      Lab Results   Component Value Date/Time    SODIUM 135 04/11/2019 11:56 PM    POTASSIUM 5.6 (H) 04/12/2019 04:04 AM    CHLORIDE 105 04/11/2019 11:56 PM    CO2 20 04/11/2019 11:56 PM    GLUCOSE 106 (H) 04/11/2019 11:56 PM    BUN 93 (HH) 04/11/2019 11:56 PM    CREATININE 2.05 (H) 04/11/2019 11:56 PM      Lab Results   Component Value Date/Time    ASTSGOT 19 04/11/2019 06:15 PM    ALTSGPT 16 04/11/2019 06:15 PM     Lab Results   Component Value Date/Time    TROPONINI 0.97 (H) 04/12/2019 04:04 AM             Cardiac Imaging and Procedures Review  EKG:  My personal interpretation of the EKG dated yesterday rapid atrial fibrillation T wave inversions anterior laterally, normal QRS    Echocardiogram: Done today rapid atrial fibrillation, poor filling times leading to low ejection fraction about 30-35%, moderate mitral regurgitation.  Right heart not well seen    Imaging  Chest X-Ray: Not yet done,  ordered      Assessment/Plan    Decreased ejection fraction  No clinical evidence of heart failure in fact I agree with the hospitalist that she appears quite volume depleted.  She is severely anemic and in rapid atrial fibrillation  The goal would be to control her heart rates by volume repletion and gentle AV toma blockade.  She has acute kidney failure likely prerenal.  Very close watching for heart failure  Close watch on potassium  No diuresis    Atrial fibrillation  This I believe is the cause of her decreased ejection fraction  Fluid resuscitation  Rate control will be important but problematic with her acute kidney failure which is advanced.  We will try gentle digoxin and keep an eye on her EKG and levels  If her blood pressure permits, we will continue to use use diltiazem  Not sure when she was last in sinus rhythm, risk for stroke is high based on age and comorbidities.  No anticoagulation based on bleeding    Mitral regurgitation  Very low effective ejection fraction  Heart rate control as above, no noted symptoms at this point    Positive troponin  Likely second to demand in the setting of GI bleed, massive obesity and probable sleep apnea and rapid atrial fibrillation  In the situation of a type II myocardial infarction the primary goal is treating the cause  No indication for blood thinners obviously particularly in the setting of bleeding concern  Despite this, her prognosis is still very guarded.  Type II myocardial infarctions have worse outcomes by far an acute coronary syndrome.      Discussed with hospitalist, in regards to clearance for EGD.  This is a low risk procedure and it is urgent.  There are no other perioperative cardiac risk stratification or interventions that would meaningfully reduce her risk.  Thank you for allowing me to participate in the care of this patient.    Please contact me with any questions.    Dorothea Kenney M.D.   Cardiologist, [facility]  [phone]

## 2019-04-12 NOTE — OP REPORT
OP Note  Procedure Date: 4/12/2019     PreOp Diagnosis: Anemia, melena    PostOp Diagnosis:   Esophagus: grossly  Normal  Stomach: 4 cm hiatal hernia, 36-40 cm; gastritis in antrum s/p cold forceps biopsy   Duodenum: grossly normal, s/p cold forceps biopsy   No fresh blood nor blood clot was seen in the entire exam.    Procedure(s):  GASTROSCOPY - Wound Class: Clean Contaminated    Surgeon(s):  Bradly Do M.D.    Anesthesiologist/Type of Anesthesia:  Anesthesiologist: Corey Terrazas III, M.D./General    Surgical Staff:  Circulator: Florencio Kim R.N.  Endoscopy Technician: Amisha Andersen  Monitoring Nurse: Jennie Negro R.N.    Specimens removed if any:  ID Type Source Tests Collected by Time Destination   A :  Tissue Duodenum PATHOLOGY SPECIMEN Bradly Do M.D. 4/12/2019  4:27 PM    B :  Tissue Gastric PATHOLOGY SPECIMEN Bradly Do M.D. 4/12/2019  4:27 PM        Estimated Blood Loss: minimal    Service Date: .TD    Procedure duration:     Anesthesia/Medications:  see anesthesia note     COMPLICATIONS:  No immediate complications.    PROCEDURE IN DETAIL, Findings and ENDOSCOPIC DIAGNOSIS:      -Prior to the procedure, a History and Physical was performed, and patient medications and allergies were reviewed. The patient’s tolerance of previous anesthesia was also reviewed. The risks and benefits of the procedure and the sedation options and risks were discussed with the patient. All questions were answered, and informed consent was obtained. The patient was deemed in satisfactory condition to undergo the procedure.    -Prior Anticoagulants: the patient has taken no previous anticoagulant or antiplatelet agents.    -ASA Grade Assessment: see anesthesia note     -The patient was placed in the left lateral decubitus position. The scope was passed under direct vision. Continuous oxygen was provided via nasal cannula and intravenous sedation was administered in divided doses throughout the  procedure. The patient’s blood pressure, pulse, and oxygen saturation were monitored continuously throughout the procedure.    -The gastroscope was gently advanced under direct visualization over the tongue, down the esophagus, through the stomach and into the 2nd portion of the duodenum. The color, texture, mucosa and anatomy of esophagus, stomach and duodenum were carefully examined with the scope. The scope was then withdrawn from the patient and the procedure terminated. Further details are in the finding section, based on anatomical location.    -The colonoscope was gently introduced through the anus and advanced to the cecum, identified by appendiceal orifice & ileocecal valve. The scope was then fully withdrawn while examining the color, texture, anatomy and integrity of the mucosa from the cecum to the anal canal. The scope was completely retrieved upon exiting the anal canal and the procedure was terminated. The colonoscopy was performed without difficulty. The patient tolerated the procedure well. The quality of the bowel preparation was good. Further details are in the finding paragraph, based on anatomical location.    -The patient tolerated the procedure well and there were no immediate complications. The patient was then transferred to the recovery room in stable condition.    Findings:  Esophagus: grossly  Normal  Stomach: 4 cm hiatal hernia, 36-40 cm; gastritis in antrum s/p cold forceps biopsy   Duodenum: grossly normal, s/p cold forceps biopsy   No fresh blood nor blood clot was seen in the entire exam.    RECOMMENDATIONS:    1. A letter will be sent regarding to the biopsy result in about 2 weeks.  2. Resume diet  3. NM RBC bleeding scan if any S/S of active GI bleeding  4. Monitor H/H and vitals    4/12/2019 4:33 PM Bradly Do M.D.

## 2019-04-12 NOTE — RESPIRATORY CARE
COPD EDUCATION by COPD CLINICAL EDUCATOR  4/12/2019  at  9:20 AM by Felicia Martinez     Patient interviewed by COPD education team. Short intervention has been conducted which included proper technique for cleaning her nebulizer and MDI spacer training..  A comprehensive packet including information about COPD, treatments, and smoking cessation given.

## 2019-04-12 NOTE — ASSESSMENT & PLAN NOTE
-Suspect this is type II NSTEMI due to demand ischemia from GI bleed, anemia, and tachycardia with RVR.  Troponin has trended down.  -Continue to hold antiplatelets and anticoagulants due to GI bleed.  -Cardiology on board, with further inputs. ?  Ischemic workup prior to discharge.  -Monitor on telemetry.

## 2019-04-12 NOTE — PROGRESS NOTES
Hospital Medicine Daily Progress Note    Date of Service  4/12/2019    Chief Complaint  Weakness, poor appetite, nausea, melena    Hospital Course    78 y.o. female with COPD, hypertension, hypothyroidism, atrial fibrillation on anticoagulation with Eliquis, admitted 4/11/2019 with weakness and poor appetite, nausea with retching but no vomiting, and report of persistent black stools and melena.  Initial outside hospital workup showed mildly elevated troponin of 0.7, with hemoglobin of 7.9, potassium of 6.5, and creatinine of 2.25.  Chest x-ray showed blunting of the right costophrenic angle with no consolidations, and moderate cardiomegaly.  Repeat labs here showed WBC of 12,100, and hemoglobin of 8.9, with potassium 6.2, and creatinine of 2.01.  CT of the abdomen showed ventral abdominal wall hernia containing loop of sigmoid colon with no evidence of obstruction.  She was felt to have GI bleed.  Her anticoagulation was held, and she started on PPI drip. GI was consulted.  She is also noted to be tachycardic, felt to be related to her worsening anemia.  She is started on volume expansion with IV fluids, and was given one-time dose of Cardizem.  She was given calcium gluconate, Lasix, D50 for her hyperkalemia.            Interval Problem Update  4/12/2019 - I reviewed the patient's chart today. Uneventful night. VSS, but remains tachycardic with heart rate in the 130s. Afebrile. Saturating well on 2L O2 NC.  Hemoglobin stable at 8.3.  Potassium improved, 5.6 today.  Creatinine remains high at 2.05.  Troponin peaked at 1.06.  GI holding off on EGD for now.    > I have personally seen and examined the patient today.  She states she is less nauseated today, although still feels some.  Slightly short of breath.  No vomiting.  She complains of some epigastric abdominal discomfort.  Has had no bowel movements yet since admission.  No leg swelling.  No fevers or chills.      Consultants/Specialty  GI  Cardiology    Code  Status  Full    Disposition  Monitor on telemetry.  Low 6 clicks scores.  Lives alone in [place]. PT/OT evaluation.    Review of Systems  ROS     Pertinent positives/negatives as mentioned above.     A complete review of systems was personally done by me. All other systems were negative.       Physical Exam  Temp:  [36.1 °C (96.9 °F)-36.4 °C (97.5 °F)] 36.4 °C (97.5 °F)  Pulse:  [108-145] 120  Resp:  [16-28] 18  BP: ()/(56-78) 101/75  SpO2:  [93 %-100 %] 96 %    Physical Exam   Constitutional: She is oriented to person, place, and time. She appears well-developed. No distress.   Obese. Body mass index is 38.88 kg/m².   HENT:   Head: Normocephalic and atraumatic.   Mouth/Throat: No oropharyngeal exudate.   Eyes: Pupils are equal, round, and reactive to light. Conjunctivae are normal. No scleral icterus.   Neck: Normal range of motion. Neck supple.   Cardiovascular: Exam reveals no gallop and no friction rub.    Irregular rhythm, tachycardic.  No murmurs.   Pulmonary/Chest: Effort normal. No respiratory distress. She has no wheezes. She has no rales. She exhibits no tenderness.   Diminished air entry B/L bases, otherwise clear to auscultation   Abdominal: Soft. She exhibits no distension. There is no rebound and no guarding.   Minimal epigastric tenderness, no guarding or rebound.  No peritoneal signs.   Musculoskeletal: She exhibits no edema or tenderness.   Lymphadenopathy:     She has no cervical adenopathy.   Neurological: She is alert and oriented to person, place, and time. No cranial nerve deficit.   Skin: Skin is warm and dry. No rash noted. She is not diaphoretic. No erythema. No pallor.   Psychiatric: She has a normal mood and affect. Her behavior is normal. Judgment and thought content normal.   Nursing note and vitals reviewed.        Fluids    Intake/Output Summary (Last 24 hours) at 04/12/19 1243  Last data filed at 04/12/19 0600   Gross per 24 hour   Intake           828.75 ml   Output              1200 ml   Net          -371.25 ml       Laboratory  Recent Labs      04/11/19   1815  04/12/19   0404  04/12/19   1148   WBC  12.1*   --    --    RBC  2.93*   --    --    HEMOGLOBIN  8.9*  8.3*  8.1*   HEMATOCRIT  29.1*   --    --    MCV  99.3*   --    --    MCH  30.4   --    --    MCHC  30.6*   --    --    RDW  63.6*   --    --    PLATELETCT  301   --    --    MPV  10.7   --    --      Recent Labs      04/11/19   1815  04/11/19   2356  04/12/19   0404   SODIUM  134*  135   --    POTASSIUM  6.2*  5.5  5.6*   CHLORIDE  104  105   --    CO2  21  20   --    GLUCOSE  115*  106*   --    BUN  98*  93*   --    CREATININE  2.01*  2.05*   --    CALCIUM  8.3*  8.7   --      Recent Labs      04/11/19 1815   INR  2.23*               Imaging  EC-ECHOCARDIOGRAM COMPLETE W/O CONT   Final Result      CT-ABDOMEN-PELVIS W/O   Final Result      1.  Ventral abdominal wall hernia containing loop of sigmoid colon with no evidence of obstruction.      2.  Right adrenal mass demonstrates characteristics consistent with adrenal adenoma.      3.  There is diverticulosis.      4.  Renal cysts are identified.      5.  Calcific atherosclerosis.           Assessment/Plan  * Acute GI bleeding- (present on admission)   Assessment & Plan    -Continue Protonix drip.   -Continue to hold anticoagulants.   -Monitor H&H every 8 hours, transfuse if drops below 7.  -GI on board, plan for endoscopy once medically stable.  I have consulted cardiology for cardiac clearance.  -No plans for EGD today, and thus I will advance her diet to clear liquids with no red foods.  -Close hemodynamic monitoring on telemetry.     Acute blood loss anemia from GI bleed- (present on admission)   Assessment & Plan    -She remains above transfusion threshold.  Monitor hemoglobin every 8 hours for now, transfuse if drops below 7.     Hemorrhagic disorder due to extrinsic circulating anticoagulants (HCC)- (present on admission)   Assessment & Plan    -Hold anticoagulants for  now.  Management of GI bleed as above.     Elevated troponin- (present on admission)   Assessment & Plan    -Suspect this is type II NSTEMI due to demand ischemia from GI bleed, anemia, and tachycardia with RVR.  Troponin now trending down.  -Check echocardiogram.  Holding antiplatelets and anticoagulants due to GI bleed.  -I consulted cardiology today, await further inputs.  -Monitor on telemetry.     Hyperkalemia- (present on admission)   Assessment & Plan    -Patient received hyperkalemia cocktail, with improvement in potassium level.  This is likely related from volume depletion, and acute kidney injury in setting of being on lisinopril.  -Continue hydration, restart IV NS at 125 cc/h.   -Continue to monitor renal function, and potassium levels.  BMP in the morning.  -Monitor on telemetry.     SHYAM (acute kidney injury) (HCC)- (present on admission)   Assessment & Plan    -No baseline creatinine in our system.   -Suspect this is prerenal.  -Continue hydration with IV normal saline, will start NS at 125 cc/h.  Continue to hold lisinopril.  Check digoxin level.  - avoid nephrotoxins, and continue to renally dose all medications.     Atrial fibrillation with rapid ventricular response (HCC)- (present on admission)   Assessment & Plan    -Suspect this is due to anemia, and volume depletion.  -Continue digoxin, check digoxin level.  -Continue Cardizem 60 mg every 8 hours.    -Continue hydration with IV fluids.  Monitor hemoglobin, and transfuse as necessary.  -Hold Eliquis for now due to GI bleed.  -Monitor on telemetry.  Cardiology has been consulted.     Obesity- (present on admission)   Assessment & Plan    - Body mass index is 38.88 kg/m²..  - outpatient referral for outpatient weight management.     HLD (hyperlipidemia)- (present on admission)   Assessment & Plan    -Continue home dose Lipitor.     COPD (chronic obstructive pulmonary disease) (HCC)- (present on admission)   Assessment & Plan    -Not in acute  exacerbation.  Continue home Symbicort, along with bronchodilators per RT protocol.     Hypothyroidism- (present on admission)   Assessment & Plan    -Continue home dose Synthroid.  Check TSH.     HTN (hypertension)- (present on admission)   Assessment & Plan    -Continue Cardizem.  Continue to hold lisinopril and hydrochlorothiazide due to hyperkalemia and acute kidney injury.   -Start PRN IV hydralazine for significant hypertension.          VTE prophylaxis: SCD.  Anticoagulation on hold due to GI bleed

## 2019-04-12 NOTE — ASSESSMENT & PLAN NOTE
Patient now is hypotensive.  Lisinopril and Aldactone has been discontinued by cardiology.  Try to avoid any fluids to improve blood pressure due to high risk for pulmonary edema and avoid Midodrine due to severely reduced ejection fraction.   Monitor for now.

## 2019-04-12 NOTE — ASSESSMENT & PLAN NOTE
-Patient received hyperkalemia cocktail, with improvement in potassium level.  This is likely related from volume depletion, and acute kidney injury in setting of being on lisinopril.  Potassium has normalized.  -Continue to monitor renal function, and potassium levels.  Repeat BMP in the morning.  -Monitor on telemetry.

## 2019-04-12 NOTE — CARE PLAN
Problem: Safety  Goal: Will remain free from falls  Outcome: PROGRESSING SLOWER THAN EXPECTED  Bed locked in lowest position and call light is within reach; patient calls appropriately.      Problem: Infection  Goal: Will remain free from infection  Outcome: PROGRESSING AS EXPECTED

## 2019-04-12 NOTE — ED NOTES
Med Rec Updated and Complete per Pt at bedside and Home Pharmacy  Allergies Reviewed  No PO ABX last 30 days per Pt and Home Pharmacy.    Pt is on Eliquis 5mg twice daily with her last dose 04/11/19 AM.    Pt on LD ASA daily.

## 2019-04-12 NOTE — ASSESSMENT & PLAN NOTE
-EGD showed gastritis. Tagged RBC bleeding scan was negative.  Biopsy positive for H. Pylori, and showed gastritis.  -Continue oral Prilosec.  Start amoxicillin and clarithromycin for 14 days course for H. Pylori.  -Continue to monitor H&H closely, transfuse as necessary with restrictive transfusion strategy.  -needs outpatient colonoscopy, follow-up with GI as outpatient.   -Continue to hold her anticoagulation until cleared by GI as outpatient.   -Close hemodynamic monitoring on telemetry.

## 2019-04-12 NOTE — PROGRESS NOTES
Notified Dr. Lin that patient's heart rate continues to sustain in the 130-150s. One time dose of Metoprolol 5mg IV ordered.

## 2019-04-12 NOTE — CONSULTS
Date of Consultation:  4/11/2019    Patient: : Rhoda Gaines  MRN: [id number]    Referring Physician: Dr. Sanchez     GI:Abraham Jurado M.D.     Reason for Consultation:Melena, Anemia    History of Present Illness:   Thank you for allowing me to consult on this patient.  This patient was seen by request of Dr. Sanchez for melena and anemia.  Patient is a poor historian with limited insight into her disease process.  Patient was transferred from [facility] for concern for upper GI bleed.  Is no history of atrial fibrillation as well as congestive heart failure recently admitted to hospital in [place] for heart failure and per ER records was discharged on anticoagulation.  Patient reports noticing black stool for few weeks ever since she started taking iron supplement secondary to fatigue.  There was no accompanying records however based on ER records patient's baseline was around 9 current admission show hemoglobin of 7.9 Hemoccult positive at outside facility.  Patient was thus transfer for further evaluation as patient also has shortness of breath.  Patient did receive 2 units of blood per ER transfer record.  Denies any hematemesis hemoptysis.  Outside evaluation also show elevated troponin to 0.763 with also elevated creatinine function and potassium.  Patient currently reports the shortness of breath otherwise without any complaints.  Patient has not had any bowel movement since arrival at the emergency room.  Denies any jp abdominal pain.        Past Medical History:   Diagnosis Date   • A-fib (HCC)    • Chronic obstructive pulmonary disease (HCC)    • Hypertension    • Psychiatric disorder     depression   • Thyroid disease          Past Surgical History:   Procedure Laterality Date   • OTHER ORTHOPEDIC SURGERY      knee       History reviewed. No pertinent family history.    Social History     Social History   • Marital status: Single     Spouse name: N/A   • Number of children: N/A   • Years of education:  N/A     Social History Main Topics   • Smoking status: Former Smoker   • Smokeless tobacco: Never Used   • Alcohol use No   • Drug use: No   • Sexual activity: Not on file     Other Topics Concern   • Not on file     Social History Narrative   • No narrative on file       Review of Systems   HENT: Negative.    Eyes: Negative.    Respiratory: Positive for shortness of breath. Negative for cough, hemoptysis, sputum production and wheezing.    Cardiovascular: Positive for orthopnea and leg swelling.   Gastrointestinal: Positive for melena. Negative for abdominal pain, blood in stool, constipation, diarrhea, heartburn, nausea and vomiting.   Genitourinary: Negative.    Musculoskeletal: Negative.    Skin: Negative.    Neurological: Positive for weakness.   Endo/Heme/Allergies: Negative.    Psychiatric/Behavioral: Negative.          Physical Exam:  Vitals:    04/11/19 1831 04/11/19 1901 04/11/19 1915 04/11/19 1930   BP:       Pulse: (!) 123 (!) 127 (!) 125 (!) 118   Resp: (!) 21 16 20 (!) 24   Temp:       TempSrc:       SpO2: 98% 93% 97% 99%   Weight:       Height:           Physical Exam   Constitutional: She appears distressed.   HENT:   Head: Normocephalic and atraumatic.   Mouth/Throat: No oropharyngeal exudate.   Eyes: Pupils are equal, round, and reactive to light. Conjunctivae and EOM are normal. Right eye exhibits no discharge. Left eye exhibits no discharge. No scleral icterus.   Neck: Normal range of motion. Neck supple. No thyromegaly present.   Cardiovascular: Normal rate and regular rhythm.  Exam reveals gallop. Exam reveals no friction rub.    No murmur heard.  Tachycardia   Pulmonary/Chest: Effort normal and breath sounds normal. No stridor. No respiratory distress. She has no wheezes. She has no rales. She exhibits no tenderness.   Abdominal: Soft. Bowel sounds are normal. She exhibits no distension and no mass. There is no tenderness. There is no rebound and no guarding.   Chaperoned rectal exam with  scant amount of dark stool. Palpable abdominal wall hernia   Musculoskeletal: She exhibits edema. She exhibits no tenderness.   Lymphadenopathy:     She has no cervical adenopathy.   Neurological: She is alert.   Skin: Skin is warm and dry. No rash noted. No erythema. No pallor.   Psychiatric: Affect normal.         Labs:  Recent Labs      04/11/19 1815   WBC  12.1*   RBC  2.93*   HEMOGLOBIN  8.9*   HEMATOCRIT  29.1*   MCV  99.3*   MCH  30.4   MCHC  30.6*   RDW  63.6*   PLATELETCT  301   MPV  10.7     Recent Labs      04/11/19 1815   SODIUM  134*   POTASSIUM  6.2*   CHLORIDE  104   CO2  21   GLUCOSE  115*   BUN  98*     Recent Labs      04/11/19 1815   INR  2.23*         Imaging:  CT Scan 4/11/2019  1.  Ventral abdominal wall hernia containing loop of sigmoid colon with no evidence of obstruction.  2.  Right adrenal mass demonstrates characteristics consistent with adrenal adenoma.  3.  There is diverticulosis.  4.  Renal cysts are identified.  5.  Calcific atherosclerosis.        Impressions:  #Anemia  #Elevated troponin 1.06  #Hyperkalemia  #Elevated Creatinine, elevated BUN; unclear baseline  #Ventral hernia with sigmoid colon  # Diverticulosis  # Adrenal Adenoma  #Tachycadia  # Atrial fibrillation on anticoagulation  # Congestive Heart Failure  # Shortness of breath  # Hypothyroidism    78-year-old female with history of hypothyroidism congestive heart failure atrial fibrillation on chronic anticoagulation who presents for fatigue at outside hospital with associated shortness of breath with found to be tachypneic with tachycardia as well as elevated creatinine and associated troponin elevation.  It is unclear if this is troponin leak versus lack of clearance secondary to AK I.  There are no available baseline labs for comparison immediately.  Currently no signs of rapid GI bleed as suggested by lack of bowel movement since arrival.  Currently appears very tachypneic with shortness of breath with associated  tachycardia.  And in setting of possible MI as suggested by troponin leak not ideal for endoscopic evaluation immediately. Melena could also be secondary to iron use.      Recommendations:  1. Serial H/H monitoring, keeping HgB > 7 but if in setting of ischemia would recommend keeping it > 8. Transfusion as per team  2. Continue PPI drip  3. No immediate plan for EGD until medical cardiac optimization by team and evaluated by Cardiology. If significant large GI bleed, please contact GI for emergent EGD in OR with anesthesia with intubation.  4. Shortness of breath/heart failure management as per team  5. Ideally hold anticoagulation therapy in setting of suspected GI bleed unless required by Cardiology if benefits outweigh risks.  6. Very high risks for sedation given multiple medical conditions including chronic O2 use, SHYAM, CHF, Atrial Fibrillation  7. Will continue to follow; recommend obtaining outside baseline lab for comparison.    This note was generated using voice recognition software which has a small chance of producing errors of grammar and possibly content. I have made every reasonable attempt to find and correct any obvious errors, but expect that some may not be found prior to finalization of this note.

## 2019-04-12 NOTE — ANESTHESIA POSTPROCEDURE EVALUATION
Patient: Rhoda Gaines    Procedure Summary     Date:  04/12/19 Room / Location:  [place] [address] / SURGERY [facility]    Anesthesia Start:  1609 Anesthesia Stop:  1645    Procedure:  GASTROSCOPY (N/A Esophagus) Diagnosis:  (hiatal hernia, esophagitis, gastritis)    Surgeon:  Bradly Do M.D. Responsible Provider:  Corey Terrazas III, M.D.    Anesthesia Type:  MAC ASA Status:  4 - Emergent          Final Anesthesia Type: MAC  Last vitals  BP   Blood Pressure : (!) 99/69, NIBP: 112/49    Temp   36.7 °C (98 °F)    Pulse   Pulse: (!) 107, Heart Rate (Monitored): 89   Resp   (!) 23    SpO2   97 %      Anesthesia Post Evaluation    Patient location during evaluation: PACU  Patient participation: complete - patient participated  Level of consciousness: awake and alert  Pain score: 0    Airway patency: patent  Anesthetic complications: no  Cardiovascular status: hemodynamically stable  Respiratory status: acceptable  Hydration status: euvolemic    PONV: none           Nurse Pain Score: 0 (NPRS)

## 2019-04-12 NOTE — ED TRIAGE NOTES
Transfer from [facility] d/t GIB with N STEMI and hyperkalemia.  Pt c/o weakness and increased SOB.

## 2019-04-12 NOTE — PROGRESS NOTES
· 2 RN skin check complete with TAY Oliva.  · Devices in place Oxygen.  · Skin assessed under devices pink and blanching.  · Confirmed pressure ulcers found on N/A.  · New potential pressure ulcers noted on N/A. Wound consult placed and wound reported.  · The following interventions in place Interdry to pannus  · Redness to pannus, heels dry, cracked and boggy with blanchable redness to bony prominences. Generalized bruising.

## 2019-04-12 NOTE — ED NOTES
BS report to Cherie Murphy RN, chart with RN, all belongings with pt. All questions answered at this time.

## 2019-04-12 NOTE — ED PROVIDER NOTES
ED Provider Note    CHIEF COMPLAINT  Chief Complaint   Patient presents with   • Weakness   • GI Problem       HPI  Rhoda Gaines is a 78 y.o. female who presents transferred from [facility] for concern for acute upper GI bleed.  Patient with history of atrial fibrillation and heart failure, recently admitted for this at [facility] and discharged.  Patient reports she was discharged on a blood thinner but is unsure what blood thinner this was, she does not know if it is warfarin, Eliquis or Xarelto.  This documentation is not in [facility] records.  Patient reports that she has been having black stool for around the last 2 weeks but she has been taking iron supplements so she thought this was what it was from.  She reports that she developed worsening dyspnea and decreased exertional capacity over the last week therefore presented to the emergency department where hemoglobin was found to be depressed from baseline of around 9-7.9.  She also was Hemoccult positive from below.  She was then sent to our facility for further care.  Per documentation is not clear if patient received pantoprazole.  Patient did receive 2 units of blood per [facility] documentation.  Patient reports associated nausea without any episodes of emesis.  Patient with a long-standing history of a ventral hernia which she reports is more painful than normal.  Troponin at outside hospital was also elevated to 0.763, her kidney function was depressed with a creatinine greater than 2 and a potassium of 6.5.  She was given temporizing measures prior to transfer.    REVIEW OF SYSTEMS  Review of Systems   Respiratory: Positive for shortness of breath.    Gastrointestinal: Positive for nausea. Negative for abdominal pain and vomiting.   Genitourinary: Negative for dysuria and urgency.       See HPI for further details. All other systems are negative.     PAST MEDICAL HISTORY   has a past medical history of A-fib (HCC); Chronic obstructive pulmonary disease  (HCC); Hypertension; Psychiatric disorder; and Thyroid disease.    SOCIAL HISTORY  Social History     Social History Main Topics   • Smoking status: Former Smoker   • Smokeless tobacco: Never Used   • Alcohol use No   • Drug use: No   • Sexual activity: Not on file       SURGICAL HISTORY   has a past surgical history that includes other orthopedic surgery.    CURRENT MEDICATIONS  Home Medications    **Home medications have not yet been reviewed for this encounter**         ALLERGIES  No Known Allergies    PHYSICAL EXAM  Physical Exam   Constitutional: She is oriented to person, place, and time. She appears well-developed and well-nourished.   Obese   HENT:   Head: Normocephalic and atraumatic.   Eyes:   Pale sclera   Neck: Normal range of motion. Neck supple.   Cardiovascular:   Rapid, irregularly irregular   Pulmonary/Chest: Effort normal and breath sounds normal.   Abdominal: Soft. Bowel sounds are normal. She exhibits no distension. There is no tenderness. There is no rebound.   Neurological: She is alert and oriented to person, place, and time.   Skin: Skin is warm and dry. No rash noted.   Psychiatric: She has a normal mood and affect. Her behavior is normal.         DIAGNOSTIC STUDIES / PROCEDURES    EKG  Atrial fibrillation with associated T wave inversions in lateral leads.  No  associated ST elevation.    LABS  Results for orders placed or performed during the hospital encounter of 04/11/19   CBC WITH DIFFERENTIAL   Result Value Ref Range    WBC 12.1 (H) 4.8 - 10.8 K/uL    RBC 2.93 (L) 4.20 - 5.40 M/uL    Hemoglobin 8.9 (L) 12.0 - 16.0 g/dL    Hematocrit 29.1 (L) 37.0 - 47.0 %    MCV 99.3 (H) 81.4 - 97.8 fL    MCH 30.4 27.0 - 33.0 pg    MCHC 30.6 (L) 33.6 - 35.0 g/dL    RDW 63.6 (H) 35.9 - 50.0 fL    Platelet Count 301 164 - 446 K/uL    MPV 10.7 9.0 - 12.9 fL    Neutrophils-Polys 85.00 (H) 44.00 - 72.00 %    Lymphocytes 8.80 (L) 22.00 - 41.00 %    Monocytes 4.40 0.00 - 13.40 %    Eosinophils 0.00 0.00 -  6.90 %    Basophils 0.00 0.00 - 1.80 %    Nucleated RBC 1.30 /100 WBC    Neutrophils (Absolute) 10.29 (H) 2.00 - 7.15 K/uL    Lymphs (Absolute) 1.06 1.00 - 4.80 K/uL    Monos (Absolute) 0.53 0.00 - 0.85 K/uL    Eos (Absolute) 0.00 0.00 - 0.51 K/uL    Baso (Absolute) 0.00 0.00 - 0.12 K/uL    NRBC (Absolute) 0.16 K/uL    Anisocytosis 1+     Macrocytosis 1+     Microcytosis 1+    CMP   Result Value Ref Range    Sodium 134 (L) 135 - 145 mmol/L    Potassium 6.2 (H) 3.6 - 5.5 mmol/L    Chloride 104 96 - 112 mmol/L    Co2 21 20 - 33 mmol/L    Anion Gap 9.0 0.0 - 11.9    Glucose 115 (H) 65 - 99 mg/dL    Bun 98 (HH) 8 - 22 mg/dL    Creatinine 2.01 (H) 0.50 - 1.40 mg/dL    Calcium 8.3 (L) 8.5 - 10.5 mg/dL    AST(SGOT) 19 12 - 45 U/L    ALT(SGPT) 16 2 - 50 U/L    Alkaline Phosphatase 67 30 - 99 U/L    Total Bilirubin 0.7 0.1 - 1.5 mg/dL    Albumin 3.6 3.2 - 4.9 g/dL    Total Protein 6.5 6.0 - 8.2 g/dL    Globulin 2.9 1.9 - 3.5 g/dL    A-G Ratio 1.2 g/dL   PT/INR   Result Value Ref Range    PT 24.8 (H) 12.0 - 14.6 sec    INR 2.23 (H) 0.87 - 1.13   LACTIC ACID   Result Value Ref Range    Lactic Acid 2.2 (H) 0.5 - 2.0 mmol/L   TROPONIN   Result Value Ref Range    Troponin I 1.06 (H) 0.00 - 0.04 ng/mL   ESTIMATED GFR   Result Value Ref Range    GFR If  29 (A) >60 mL/min/1.73 m 2    GFR If Non  24 (A) >60 mL/min/1.73 m 2   DIFFERENTIAL MANUAL   Result Value Ref Range    Metamyelocytes 0.90 %    Myelocytes 0.90 %    Manual Diff Status PERFORMED    PERIPHERAL SMEAR REVIEW   Result Value Ref Range    Peripheral Smear Review see below    PLATELET ESTIMATE   Result Value Ref Range    Plt Estimation Normal    MORPHOLOGY   Result Value Ref Range    RBC Morphology Present     Polychromia 1+     Poikilocytosis 1+     Ovalocytes 1+    EKG   Result Value Ref Range    Report       [facility] Emergency Dept.    Test Date:  2019-04-11  Pt Name:    MANDI DUDLEY                 Department:  ER  MRN:        [id number]                      Room:       RD 12  Gender:     Female                       Technician: [id number]  :        [id number]                   Requested By:ER TRIAGE PROTOCOL  Order #:    [id number]                    Reading MD: Daniel Alarcon MD    Measurements  Intervals                                Axis  Rate:       126                          P:  MD:                                      QRS:        83  QRSD:       106                          T:          258  QT:         296  QTc:        429    Interpretive Statements  Atrial fibrillation with associated T wave inversions in lateral leads.  No  associated ST elevation.    Electronically Signed On 2019 18:31:17 PDT by Daniel Alarcon MD           RADIOLOGY  CT-ABDOMEN-PELVIS W/O   Final Result      1.  Ventral abdominal wall hernia containing loop of sigmoid colon with no evidence of obstruction.      2.  Right adrenal mass demonstrates characteristics consistent with adrenal adenoma.      3.  There is diverticulosis.      4.  Renal cysts are identified.      5.  Calcific atherosclerosis.            30 minutes of critical care time were spent taking care of this patient    COURSE & MEDICAL DECISION MAKING  Pertinent Labs & Imaging studies reviewed. (See chart for details)  Patient here with concern of acute GI bleed.  Will check lactic acid as well as a CT to evaluate for possible bowel ischemia though believe this is less likely.  I do not believe that giving contrast in this patient with an AK I is currently indicated to evaluate as the risk of causing patient to develop kidney failure outweighs my low clinical suspicion of mesenteric ischemia.  Patient will be placed on pantoprazole drip.  Will recheck troponin and basic labs to ensure the patient's potassium has improved.  Patient is mildly tachycardic with A. fib with RVR.  I believe this is likely in response to her acute bleeding and therefore will hold off any empiric rate  management as a believe this is physiologic.  Will discuss the case with GI.  GI will see patient in the ICU.  I discussed the case with hospitalist who will admit.  Patient is on Eliquis, her last dose was almost 24 hours ago.  There is no need for reversal therefore in this hemodynamically stable patient.  Patient will remain on pantoprazole drip.  Patient's lactate is only mildly elevated, this would be inconsistent with ongoing bowel ischemia.  Holding IV fluids given patient's history of heart failure.      FINAL IMPRESSION  1.  Acute GI bleed         Electronically signed by: Dorian Sanchez, 4/11/2019 6:27 PM

## 2019-04-12 NOTE — ANESTHESIA TIME REPORT
Anesthesia Start and Stop Event Times     Date Time Event    4/12/2019 1609 Anesthesia Start        Responsible Staff  04/12/19    Name Role Begin End    Corey Terrazas III, M.D. Anesth 1609         Preop Diagnosis (Free Text):  Pre-op Diagnosis     Melena, Anemia        Preop Diagnosis (Codes):  Diagnosis Information     Diagnosis Code(s):         Post op Diagnosis  GIB (gastrointestinal bleeding)  cardiomyopathy, Afib, COPD, morbid obesity, coagulopathy, SHYAM    Premium Reason  A. 3PM - 7AM    Comments:

## 2019-04-12 NOTE — ASSESSMENT & PLAN NOTE
-Creatinine has significantly improved.  Stop IV fluids given her low EF.  -continue to hold lisinopril, resume if creatinine remains stable.  - avoid nephrotoxins, and continue to renally dose all medications.

## 2019-04-12 NOTE — PROGRESS NOTES
Cardiology Follow Up Progress Note    Date of Service  4/12/2019    Attending Physician  Yanick Wade M.D.    Reason for consultation     Atrial fibrillation RVR, troponin elevation 0.97    History of   Patient lives in [place], morbid obesity, poor historian, likelihood of recent hospitalization with heart failure and atrial fibrillation, reports black stool for some time but thought secondary to iron      Admitted for     TX from outside facility for GI bleed, patient reports tarry stool for quite some time (believed it secondary to iron use), hemoglobin 7.9, hematocrit 25.7 GI was consulted    Interim Events    4/13/19 remains in A. fib, rate controlled, asymptomatic, no dyspnea, no angina, hypotensive, blood transfusion in progress    Review of Systems  Review of Systems   Respiratory: Negative for apnea, cough, choking, chest tightness, shortness of breath, wheezing and stridor.    Cardiovascular: Negative for chest pain and leg swelling.       Vital signs in last 24 hours  Temp:  [36.1 °C (96.9 °F)-36.4 °C (97.6 °F)] 36.2 °C (97.2 °F)  Pulse:  [] 103  Resp:  [16-28] 18  BP: ()/(56-78) 105/69  SpO2:  [93 %-100 %] 98 %    Physical Exam  Physical Exam   Constitutional: She is oriented to person, place, and time.   BMI 40   HENT:   Head: Normocephalic.   Eyes: Conjunctivae are normal.   Neck: No JVD present. No thyromegaly present.   Cardiovascular: Normal rate.  An irregularly irregular rhythm present.   Pulses:       Carotid pulses are 2+ on the right side, and 2+ on the left side.       Radial pulses are 2+ on the right side, and 2+ on the left side.   Pulmonary/Chest: She has no wheezes.   Abdominal: Soft.   Musculoskeletal: She exhibits no edema.   Neurological: She is alert and oriented to person, place, and time.   Skin: Skin is warm and dry.       Lab Review  Lab Results   Component Value Date/Time    WBC 12.1 (H) 04/11/2019 06:15 PM    RBC 2.93 (L) 04/11/2019 06:15 PM    HEMOGLOBIN 8.1 (L)  04/12/2019 11:48 AM    HEMATOCRIT 29.1 (L) 04/11/2019 06:15 PM    MCV 99.3 (H) 04/11/2019 06:15 PM    MCH 30.4 04/11/2019 06:15 PM    MCHC 30.6 (L) 04/11/2019 06:15 PM    MPV 10.7 04/11/2019 06:15 PM      Lab Results   Component Value Date/Time    SODIUM 135 04/11/2019 11:56 PM    POTASSIUM 5.0 04/12/2019 11:48 AM    CHLORIDE 105 04/11/2019 11:56 PM    CO2 20 04/11/2019 11:56 PM    GLUCOSE 106 (H) 04/11/2019 11:56 PM    BUN 93 (HH) 04/11/2019 11:56 PM    CREATININE 2.05 (H) 04/11/2019 11:56 PM      Lab Results   Component Value Date/Time    ASTSGOT 19 04/11/2019 06:15 PM    ALTSGPT 16 04/11/2019 06:15 PM     Lab Results   Component Value Date/Time    TROPONINI 0.97 (H) 04/12/2019 04:04 AM               Assessment      GI bleed with hemoglobin 7.9 and hematocrit of 23, GI was consulted    S/p EGD 4/12/19 : Esophagus: grossly  Normal  Stomach: 4 cm hiatal hernia, 36-40 cm; gastritis in antrum s/p cold forceps biopsy   Duodenum: grossly normal, s/p cold forceps biopsy   No fresh blood nor blood clot was seen in the entire exam.        A. fib RVR, recent diagnosis of AFIB ob Xarelto . Now on hold 2/2 GIB    Elevated troponin, suspect demand ischemia in the setting of GI bleed    Severe cardiomyopathy, LVEF 20% based on echo 4/12/19, RVSP 40    Hyperkalemia    Acute kidney injury, Cr 1.71 improved     COPD    Hypertension, hypotensive overnight , H?H 7/23, required transfusion    Hyperlipidemia, on lipitor     Morbid obesity      Plan  Received Dig loading dose 4/12/19 with good HR control  hypotensive overnight , Diltiazem on hold ( last dose 4/12 at 11 am, asymptomatic  Negative inotrope , may need to switch to Metoprolol ( has emphysema , no wheezing)   Blood transfusion in process  Hypotensive , asymptomatic  GDMT for severe CMP when BP allows  Ischemic work up if indicated prior to DC, will D/w Dr Kenney  Limit IVF  Will follow

## 2019-04-12 NOTE — CARE PLAN
Problem: Communication  Goal: The ability to communicate needs accurately and effectively will improve  Outcome: PROGRESSING AS EXPECTED  Patient is able to communicate needs     Problem: Safety  Goal: Will remain free from injury  Outcome: PROGRESSING AS EXPECTED  Patient remains free from injury    Problem: Infection  Goal: Will remain free from infection  Outcome: PROGRESSING AS EXPECTED  Patient shows no s/s of infection

## 2019-04-12 NOTE — ASSESSMENT & PLAN NOTE
-Hgb remains stable. Continue to monitor hemoglobin closely.  Recommend to maintain hemoglobin above 8 mg/dL considering her cardiac history with severely reduced ejection fraction.  4/18: Hemoglobin stable and slightly trended up.  Monitor for now.

## 2019-04-12 NOTE — ED NOTES
Assumed care of patient. Pt assessed, resting in [facility] . Patient's concerns addressed.  Fall precautions in place.  Pt repositioned and comfortable.  Call light within reach. Will continue to monitor.

## 2019-04-12 NOTE — H&P
"Hospital Medicine History & Physical Note    Date of Service  4/11/2019    Primary Care Physician  No primary care provider on file.    Consultants  Dr. Jurado, GI    Code Status  Full    Chief Complaint  Chief Complaint   Patient presents with   • Weakness   • GI Problem       History of Presenting Illness  78 y.o. female who presented on 4/11/2019 in transfer from outside facility for GI bleed.  The patient reports that she was admitted to the hospital and discharged last Thursday.  She states that she believes she was admitted because of \"kidney problems\".  I do not have these records to confirm.  She states that she was well immediately following her discharge but in the last several days, has begun to feel weak with poor appetite and that she just \"feels sick\".  She reports that she has had nausea and has been retching but no vomiting.  There is been no hematemesis or hemoptysis.  She has had persistent black stools with dark tarry and sticky for quite some time although she states that she thought this was due to her iron intake.  She denies any NSAID use.  She reports a history of \"bleeding ulcer\" in her 30s and last had a colonoscopy in 1985.  She denies any previous EGDs.    At the outside facility, workup including troponin 0.7 6W BC 11.0 hemoglobin 7.9 hematocrit 25.7 platelet 328 sodium 133 potassium 6.5 chloride 101 CO2 23 BUN 99 creatinine 2.25 and blood sugar 132.  Chest x-ray showed blunting of the right costophrenic angle with no consolidations and moderate cardiomegaly.  She was transferred to our facility for higher level of care and specialist consultation.    Review of Systems  Review of Systems   Constitutional: Positive for malaise/fatigue. Negative for chills and fever.        General feeling of unwell.   HENT: Negative for congestion and sore throat.    Eyes: Negative for photophobia.   Respiratory: Negative for cough, shortness of breath and wheezing.    Cardiovascular: Negative for chest " pain and palpitations.   Gastrointestinal: Negative for abdominal pain, diarrhea, nausea and vomiting.   Genitourinary: Negative for dysuria.   Musculoskeletal: Negative for myalgias.   Skin: Negative.    Neurological: Positive for weakness. Negative for dizziness, tingling, focal weakness and headaches.   Psychiatric/Behavioral: Negative for depression and suicidal ideas.       Past Medical History  Past Medical History:   Diagnosis Date   • A-fib (HCC)    • Chronic obstructive pulmonary disease (HCC)    • Hypertension    • Psychiatric disorder     depression   • Thyroid disease        Surgical History  Past Surgical History:   Procedure Laterality Date   • OTHER ORTHOPEDIC SURGERY      knee       Family History  History reviewed. No pertinent family history.    Social History  Social History   Substance Use Topics   • Smoking status: Former Smoker   • Smokeless tobacco: Never Used   • Alcohol use No       Allergies  No Known Allergies    Medications  No current facility-administered medications on file prior to encounter.      No current outpatient prescriptions on file prior to encounter.       Physical Exam  Hemodynamics  Temp (24hrs), Av.1 °C (97 °F), Min:36.1 °C (97 °F), Max:36.1 °C (97 °F)   Temperature: 36.1 °C (97 °F)  Pulse  Av.8  Min: 108  Max: 127 Heart Rate (Monitored): (!) 123  Blood Pressure : 113/66, NIBP: 110/62      Respiratory      Respiration: 20, Pulse Oximetry: 97 %     Work Of Breathing / Effort: Moderate  RLL Breath Sounds: Diminished, LLL Breath Sounds: Diminished    Physical Exam   Constitutional: She is oriented to person, place, and time. No distress.   Obese, chronically ill-appearing   HENT:   Head: Normocephalic and atraumatic.   Right Ear: External ear normal.   Left Ear: External ear normal.   Eyes: EOM are normal. Right eye exhibits no discharge. Left eye exhibits no discharge.   Neck: Neck supple. No JVD present.   Cardiovascular: Normal rate, regular rhythm and normal  heart sounds.    Pulmonary/Chest: Effort normal and breath sounds normal. No respiratory distress. She exhibits no tenderness.   Abdominal: Soft. Bowel sounds are normal. She exhibits no distension. There is no tenderness.   Musculoskeletal: Normal range of motion. She exhibits no edema.   Neurological: She is alert and oriented to person, place, and time. No cranial nerve deficit.   Skin: Skin is warm and dry. She is not diaphoretic. No erythema. There is pallor.   Psychiatric: She has a normal mood and affect. Her behavior is normal.   Nursing note and vitals reviewed.    Capillary refill less than 3 seconds, distal pulses intact    Laboratory:  Recent Labs      04/11/19   1815   WBC  12.1*   RBC  2.93*   HEMOGLOBIN  8.9*   HEMATOCRIT  29.1*   MCV  99.3*   MCH  30.4   MCHC  30.6*   RDW  63.6*   PLATELETCT  301   MPV  10.7     Recent Labs      04/11/19   1815   SODIUM  134*   POTASSIUM  6.2*   CHLORIDE  104   CO2  21   GLUCOSE  115*   BUN  98*   CREATININE  2.01*   CALCIUM  8.3*     Recent Labs      04/11/19   1815   ALTSGPT  16   ASTSGOT  19   ALKPHOSPHAT  67   TBILIRUBIN  0.7   GLUCOSE  115*     Recent Labs      04/11/19   1815   INR  2.23*             Lab Results   Component Value Date    TROPONINI 1.06 (H) 04/11/2019       Imaging  Ct-abdomen-pelvis W/o    Result Date: 4/11/2019 4/11/2019 6:36 PM HISTORY/REASON FOR EXAM:  Abdominal pain. TECHNIQUE/EXAM DESCRIPTION: CT scan of the abdomen and pelvis without contrast. Noncontrast helical scanning was obtained from the diaphragmatic domes through the pubic symphysis. Low dose optimization technique was utilized for this CT exam including automated exposure control and adjustment of the mA and/or kV according to patient size. COMPARISON: None. FINDINGS: CT Abdomen: No focal abnormalities are noted in the liver spleen or pancreas on this noncontrast CT. 2.7 cm low-attenuation right adrenal mass is noted with density measurement of -6 Hounsfield units. Left renal  cysts and small right renal cyst are identified. There  is no evidence of hydronephrosis. The gallbladder is borderline distended but no focal abnormalities are noted. CT Pelvis: Anterior abdominal wall hernia is noted containing a loop of sigmoid colon with no dilatation or evidence of ischemia. There is diverticulosis. There is calcific atherosclerosis. No free fluid or peritoneal inflammation is noted.     1.  Ventral abdominal wall hernia containing loop of sigmoid colon with no evidence of obstruction. 2.  Right adrenal mass demonstrates characteristics consistent with adrenal adenoma. 3.  There is diverticulosis. 4.  Renal cysts are identified. 5.  Calcific atherosclerosis.        Assessment/Plan:  Anticipate that patient will need greater than 2 midnights for management of the discussed medical issues.    * GIB (gastrointestinal bleeding)   Assessment & Plan    Patient reports the presence of tarry stools for quite some time although she did believe that this was secondary to her iron use.  However, given the decrease in her hemoglobin level, GI bleed is likely.  Patient will be admitted to the telemetry floor, she was be started on IV fluids for volume expansion as well as a proton pump drip.  I am holding all of her anticoagulation.  She does have mildly elevated INR however she has no evidence of gross bleeding at present therefore we will not plan to reverse at this time.  The patient will be kept n.p.o. Dr. Jurado of GI was consulted by the ERP and I appreciate his recommendations.  Continue to trend hemoglobin levels and plan to transfuse if it drops below 7.     Atrial fibrillation with rapid ventricular response (HCC)   Assessment & Plan    Likely exacerbated by her worsening anemia.  The patient will be monitored closely on the telemetry floor and I will provide her with a volume expansion with IV fluids.  We will give her a one-time dose of weight-based IV diltiazem for rate control and then resume  her home medications.  Holding home Eliquis in light of melena.     Elevated troponin   Assessment & Plan    Suspect demand ischemia from GI bleed, monitor on telemetry and continue to trend troponin levels     Hyperkalemia   Assessment & Plan    Patient denies any chest pain, she will be admitted to the telemetry floor for close monitoring.  I will give her dose of calcium gluconate, Lasix, and D50 with insulin and repeat potassium levels every 4 hours to ensure improvement.     SHYAM (acute kidney injury) ([facility])   Assessment & Plan    I do not have previous records to confirm, from the patient's description, she has been treated at the outside facility for kidney disease although I do not know if this is acute or chronic.  I will check urine electrolytes and continue with IV fluids and repeat chemistries in the morning.     HLD (hyperlipidemia)   Assessment & Plan    Holding home aspirin in light of melena, continue home Lipitor.     COPD (chronic obstructive pulmonary disease) ([facility])   Assessment & Plan    Continue home inhalers, respiratory therapy protocol if needed.     Hypothyroidism   Assessment & Plan    This is chronic, continue home Synthroid.     HTN (hypertension)   Assessment & Plan    Blood pressures currently very well controlled, I am holding her lisinopril in light of her hyperkalemia, okay to continue hydrochlorothiazide for now with holding parameters.         Prophylaxis: Sequential compression devices for DVT prophylaxis,  PPI indicated, bowel protocol as needed

## 2019-04-12 NOTE — PROGRESS NOTES
Patient transported to room T 801 via gurney with this ACLS on Zoll monitor at 2045. Patienttransferred to hospital bed by staff. Placed patient on monitor and verified with monitor room. POC discussed with patient; no questions or needs at this time. Bed locked in lowest position and call light is within reach; patient calls appropriately.

## 2019-04-12 NOTE — ANESTHESIA PREPROCEDURE EVALUATION
Relevant Problems   (+) COPD (chronic obstructive pulmonary disease) (HCC)   (+) HTN (hypertension)   (+) Hypothyroidism       Physical Exam    Airway   Mallampati: III  TM distance: >3 FB  Neck ROM: full       Cardiovascular - normal exam  Rhythm: regular  Rate: normal  (-) murmur     Dental - normal exam         Pulmonary - normal exam  Breath sounds clear to auscultation     Abdominal    Neurological - normal exam         Other findings: difficult airway with morbid obesity            Anesthesia Plan    ASA 4 - emergent   ASA physical status 4 criteria: other (comment)ASA physical status emergent criteria: acute hemorrhage    Plan - MAC       (Severe cardiomyopathy, SHYAM, COPD/hypoxia, Acute GI bleed, coagulopathy)      Induction: intravenous    Postoperative Plan: Postoperative administration of opioids is intended.    Pertinent diagnostic labs and testing reviewed    Informed Consent:    Anesthetic plan and risks discussed with patient.    Use of blood products discussed with: patient whom consented to blood products.

## 2019-04-12 NOTE — ASSESSMENT & PLAN NOTE
-Suspect this is due to anemia, and volume depletion.   -Better control with hydration, and transition to oral metoprolol.  -Continue digoxin.  Continue metoprolol.  Cardiology following.  -Continue to hold anticoagulation due to GI bleed.  -Continue to monitor on telemetry.

## 2019-04-13 ENCOUNTER — APPOINTMENT (OUTPATIENT)
Dept: RADIOLOGY | Facility: MEDICAL CENTER | Age: 79
DRG: 813 | End: 2019-04-13
Attending: INTERNAL MEDICINE
Payer: MEDICARE

## 2019-04-13 LAB
ABO GROUP BLD: NORMAL
ANION GAP SERPL CALC-SCNC: 9 MMOL/L (ref 0–11.9)
BARCODED ABORH UBTYP: 5100
BARCODED PRD CODE UBPRD: NORMAL
BARCODED UNIT NUM UBUNT: NORMAL
BASOPHILS # BLD AUTO: 0.2 % (ref 0–1.8)
BASOPHILS # BLD: 0.02 K/UL (ref 0–0.12)
BLD GP AB SCN SERPL QL: NORMAL
BUN SERPL-MCNC: 69 MG/DL (ref 8–22)
CALCIUM SERPL-MCNC: 7.8 MG/DL (ref 8.5–10.5)
CHLORIDE SERPL-SCNC: 110 MMOL/L (ref 96–112)
CO2 SERPL-SCNC: 23 MMOL/L (ref 20–33)
COMPONENT R 8504R: NORMAL
CREAT SERPL-MCNC: 1.71 MG/DL (ref 0.5–1.4)
DIGOXIN SERPL-MCNC: 1.7 NG/ML (ref 0.8–2)
EKG IMPRESSION: NORMAL
EOSINOPHIL # BLD AUTO: 0.03 K/UL (ref 0–0.51)
EOSINOPHIL NFR BLD: 0.3 % (ref 0–6.9)
ERYTHROCYTE [DISTWIDTH] IN BLOOD BY AUTOMATED COUNT: 66.9 FL (ref 35.9–50)
FERRITIN SERPL-MCNC: 34.3 NG/ML (ref 10–291)
FOLATE SERPL-MCNC: 9.9 NG/ML
GLUCOSE SERPL-MCNC: 76 MG/DL (ref 65–99)
HCT VFR BLD AUTO: 23.1 % (ref 37–47)
HCT VFR BLD AUTO: 27.6 % (ref 37–47)
HEMOCCULT STL QL: POSITIVE
HGB BLD-MCNC: 7 G/DL (ref 12–16)
HGB BLD-MCNC: 7.8 G/DL (ref 12–16)
HGB BLD-MCNC: 8.4 G/DL (ref 12–16)
IMM GRANULOCYTES # BLD AUTO: 0.18 K/UL (ref 0–0.11)
IMM GRANULOCYTES NFR BLD AUTO: 2 % (ref 0–0.9)
IRON SATN MFR SERPL: 26 % (ref 15–55)
IRON SERPL-MCNC: 92 UG/DL (ref 40–170)
LYMPHOCYTES # BLD AUTO: 0.69 K/UL (ref 1–4.8)
LYMPHOCYTES NFR BLD: 7.5 % (ref 22–41)
MCH RBC QN AUTO: 31.1 PG (ref 27–33)
MCHC RBC AUTO-ENTMCNC: 30.3 G/DL (ref 33.6–35)
MCV RBC AUTO: 102.7 FL (ref 81.4–97.8)
MONOCYTES # BLD AUTO: 0.87 K/UL (ref 0–0.85)
MONOCYTES NFR BLD AUTO: 9.5 % (ref 0–13.4)
NEUTROPHILS # BLD AUTO: 7.41 K/UL (ref 2–7.15)
NEUTROPHILS NFR BLD: 80.5 % (ref 44–72)
NRBC # BLD AUTO: 0.08 K/UL
NRBC BLD-RTO: 0.9 /100 WBC
PATHOLOGY CONSULT NOTE: NORMAL
PLATELET # BLD AUTO: 267 K/UL (ref 164–446)
PMV BLD AUTO: 10.1 FL (ref 9–12.9)
POTASSIUM SERPL-SCNC: 4.1 MMOL/L (ref 3.6–5.5)
PRODUCT TYPE UPROD: NORMAL
RBC # BLD AUTO: 2.25 M/UL (ref 4.2–5.4)
RH BLD: NORMAL
SODIUM SERPL-SCNC: 142 MMOL/L (ref 135–145)
TIBC SERPL-MCNC: 358 UG/DL (ref 250–450)
UNIT STATUS USTAT: NORMAL
VIT B12 SERPL-MCNC: 898 PG/ML (ref 211–911)
WBC # BLD AUTO: 9.2 K/UL (ref 4.8–10.8)

## 2019-04-13 PROCEDURE — 86850 RBC ANTIBODY SCREEN: CPT

## 2019-04-13 PROCEDURE — 82607 VITAMIN B-12: CPT

## 2019-04-13 PROCEDURE — A9270 NON-COVERED ITEM OR SERVICE: HCPCS | Performed by: HOSPITALIST

## 2019-04-13 PROCEDURE — 93010 ELECTROCARDIOGRAM REPORT: CPT | Performed by: INTERNAL MEDICINE

## 2019-04-13 PROCEDURE — 700102 HCHG RX REV CODE 250 W/ 637 OVERRIDE(OP): Performed by: HOSPITALIST

## 2019-04-13 PROCEDURE — 700105 HCHG RX REV CODE 258

## 2019-04-13 PROCEDURE — 700102 HCHG RX REV CODE 250 W/ 637 OVERRIDE(OP): Performed by: INTERNAL MEDICINE

## 2019-04-13 PROCEDURE — 770020 HCHG ROOM/CARE - TELE (206)

## 2019-04-13 PROCEDURE — 82728 ASSAY OF FERRITIN: CPT

## 2019-04-13 PROCEDURE — 85018 HEMOGLOBIN: CPT

## 2019-04-13 PROCEDURE — 05HY33Z INSERTION OF INFUSION DEVICE INTO UPPER VEIN, PERCUTANEOUS APPROACH: ICD-10-PCS | Performed by: INTERNAL MEDICINE

## 2019-04-13 PROCEDURE — 700105 HCHG RX REV CODE 258: Performed by: INTERNAL MEDICINE

## 2019-04-13 PROCEDURE — 83550 IRON BINDING TEST: CPT

## 2019-04-13 PROCEDURE — C9113 INJ PANTOPRAZOLE SODIUM, VIA: HCPCS | Performed by: INTERNAL MEDICINE

## 2019-04-13 PROCEDURE — P9016 RBC LEUKOCYTES REDUCED: HCPCS

## 2019-04-13 PROCEDURE — 82272 OCCULT BLD FECES 1-3 TESTS: CPT

## 2019-04-13 PROCEDURE — 78278 ACUTE GI BLOOD LOSS IMAGING: CPT

## 2019-04-13 PROCEDURE — 99233 SBSQ HOSP IP/OBS HIGH 50: CPT | Mod: 25 | Performed by: INTERNAL MEDICINE

## 2019-04-13 PROCEDURE — 85025 COMPLETE CBC W/AUTO DIFF WBC: CPT

## 2019-04-13 PROCEDURE — 83540 ASSAY OF IRON: CPT

## 2019-04-13 PROCEDURE — A9270 NON-COVERED ITEM OR SERVICE: HCPCS | Performed by: INTERNAL MEDICINE

## 2019-04-13 PROCEDURE — 30243N1 TRANSFUSION OF NONAUTOLOGOUS RED BLOOD CELLS INTO CENTRAL VEIN, PERCUTANEOUS APPROACH: ICD-10-PCS | Performed by: HOSPITALIST

## 2019-04-13 PROCEDURE — 700102 HCHG RX REV CODE 250 W/ 637 OVERRIDE(OP): Performed by: NURSE PRACTITIONER

## 2019-04-13 PROCEDURE — 99233 SBSQ HOSP IP/OBS HIGH 50: CPT | Performed by: INTERNAL MEDICINE

## 2019-04-13 PROCEDURE — 86901 BLOOD TYPING SEROLOGIC RH(D): CPT

## 2019-04-13 PROCEDURE — 85014 HEMATOCRIT: CPT

## 2019-04-13 PROCEDURE — 36415 COLL VENOUS BLD VENIPUNCTURE: CPT

## 2019-04-13 PROCEDURE — 82746 ASSAY OF FOLIC ACID SERUM: CPT

## 2019-04-13 PROCEDURE — 36430 TRANSFUSION BLD/BLD COMPNT: CPT

## 2019-04-13 PROCEDURE — A6250 SKIN SEAL PROTECT MOISTURIZR: HCPCS | Performed by: INTERNAL MEDICINE

## 2019-04-13 PROCEDURE — A9270 NON-COVERED ITEM OR SERVICE: HCPCS | Performed by: NURSE PRACTITIONER

## 2019-04-13 PROCEDURE — 80048 BASIC METABOLIC PNL TOTAL CA: CPT

## 2019-04-13 PROCEDURE — 700111 HCHG RX REV CODE 636 W/ 250 OVERRIDE (IP): Performed by: INTERNAL MEDICINE

## 2019-04-13 PROCEDURE — 93005 ELECTROCARDIOGRAM TRACING: CPT | Performed by: INTERNAL MEDICINE

## 2019-04-13 PROCEDURE — 80162 ASSAY OF DIGOXIN TOTAL: CPT

## 2019-04-13 PROCEDURE — 86923 COMPATIBILITY TEST ELECTRIC: CPT

## 2019-04-13 PROCEDURE — 76937 US GUIDE VASCULAR ACCESS: CPT

## 2019-04-13 RX ORDER — OMEPRAZOLE 20 MG/1
20 CAPSULE, DELAYED RELEASE ORAL DAILY
Status: DISCONTINUED | OUTPATIENT
Start: 2019-04-13 | End: 2019-04-13

## 2019-04-13 RX ORDER — PEG-3350, SODIUM SULFATE, SODIUM CHLORIDE, POTASSIUM CHLORIDE, SODIUM ASCORBATE AND ASCORBIC ACID 7.5-2.691G
100 KIT ORAL ONCE
Status: COMPLETED | OUTPATIENT
Start: 2019-04-13 | End: 2019-04-13

## 2019-04-13 RX ORDER — SODIUM CHLORIDE 9 MG/ML
INJECTION, SOLUTION INTRAVENOUS
Status: COMPLETED
Start: 2019-04-13 | End: 2019-04-13

## 2019-04-13 RX ORDER — OMEPRAZOLE 20 MG/1
40 CAPSULE, DELAYED RELEASE ORAL DAILY
Status: DISCONTINUED | OUTPATIENT
Start: 2019-04-13 | End: 2019-04-19 | Stop reason: HOSPADM

## 2019-04-13 RX ADMIN — BUDESONIDE AND FORMOTEROL FUMARATE DIHYDRATE 1 PUFF: 80; 4.5 AEROSOL RESPIRATORY (INHALATION) at 05:05

## 2019-04-13 RX ADMIN — ATORVASTATIN CALCIUM 20 MG: 20 TABLET, FILM COATED ORAL at 20:26

## 2019-04-13 RX ADMIN — SODIUM CHLORIDE: 9 INJECTION, SOLUTION INTRAVENOUS at 01:18

## 2019-04-13 RX ADMIN — DILTIAZEM HYDROCHLORIDE 60 MG: 30 TABLET, FILM COATED ORAL at 22:04

## 2019-04-13 RX ADMIN — THERA TABS 1 TABLET: TAB at 16:09

## 2019-04-13 RX ADMIN — SODIUM CHLORIDE: 9 INJECTION, SOLUTION INTRAVENOUS at 05:45

## 2019-04-13 RX ADMIN — OMEPRAZOLE 40 MG: 20 CAPSULE, DELAYED RELEASE ORAL at 16:09

## 2019-04-13 RX ADMIN — SODIUM CHLORIDE: 9 INJECTION, SOLUTION INTRAVENOUS at 18:32

## 2019-04-13 RX ADMIN — SENNOSIDES, DOCUSATE SODIUM 2 TABLET: 50; 8.6 TABLET, FILM COATED ORAL at 05:05

## 2019-04-13 RX ADMIN — BUDESONIDE AND FORMOTEROL FUMARATE DIHYDRATE 1 PUFF: 80; 4.5 AEROSOL RESPIRATORY (INHALATION) at 17:58

## 2019-04-13 RX ADMIN — PANTOPRAZOLE SODIUM 40 MG: 40 INJECTION, POWDER, LYOPHILIZED, FOR SOLUTION INTRAVENOUS at 05:05

## 2019-04-13 RX ADMIN — BUPROPION HYDROCHLORIDE 200 MG: 100 TABLET, FILM COATED, EXTENDED RELEASE ORAL at 17:57

## 2019-04-13 RX ADMIN — LEVOTHYROXINE SODIUM 50 MCG: 50 TABLET ORAL at 05:05

## 2019-04-13 RX ADMIN — POLYETHYLENE GLYCOL 3350, SODIUM SULFATE, SODIUM CHLORIDE, POTASSIUM CHLORIDE, ASCORBIC ACID, SODIUM ASCORBATE 100 G: KIT at 11:02

## 2019-04-13 RX ADMIN — BUPROPION HYDROCHLORIDE 200 MG: 100 TABLET, FILM COATED, EXTENDED RELEASE ORAL at 05:05

## 2019-04-13 ASSESSMENT — ENCOUNTER SYMPTOMS
SHORTNESS OF BREATH: 0
APNEA: 0
WHEEZING: 0
CHOKING: 0
STRIDOR: 0
COUGH: 0
CHEST TIGHTNESS: 0

## 2019-04-13 NOTE — PROGRESS NOTES
Received report from PACU nurse, patient transported to bed, placed on monitor, confirmed room number, HR, and rhythm. Started on post OP VS

## 2019-04-13 NOTE — PROGRESS NOTES
· 2 RN skin check complete with TAY Jj.  · Devices in place Waffle mattress, Q 2 hour turns, barrier cream, inter dry, heel float boots, silicone NC  · Skin assessed under devices gown, non skid socks, SCD's .  · Confirmed pressure ulcers found on N/A.  · New potential pressure ulcers noted on N/A. Wound consult placed and wound reported.  · The following interventions in place Waffle mattress, Q 2 hour turns, barrier cream, inter dry, heel float boots, silicone NC  Bilateral ears are red and blanching  Bilateral elbows are red and blanching  Sacrum is red and blanching  Heels are red, blanching and boggy

## 2019-04-13 NOTE — CARE PLAN
Problem: Venous Thromboembolism (VTW)/Deep Vein Thrombosis (DVT) Prevention:  Goal: Patient will participate in Venous Thrombosis (VTE)/Deep Vein Thrombosis (DVT)Prevention Measures  Outcome: PROGRESSING AS EXPECTED  Patient is participating in VTE. Education provided regarding SCD's    Problem: Knowledge Deficit  Goal: Knowledge of disease process/condition, treatment plan, diagnostic tests, and medications will improve  Outcome: PROGRESSING AS EXPECTED  Patient confirms understanding    Problem: Pain Management  Goal: Pain level will decrease to patient's comfort goal  Outcome: PROGRESSING AS EXPECTED  Patient has no C/O pain

## 2019-04-13 NOTE — PROGRESS NOTES
Received report from Misty at pt bedside. Pt C/O being tired and having a long night, no C/O pain, POC discussed. Call light and belongings within reach. Bed locked and in low position. Alarm on and fall precautions in place.

## 2019-04-13 NOTE — PROGRESS NOTES
Notified Dr. Lin of patient's drop in hemoglobin and decreased blood pressures overnight. Orders received for 1 unit of PRBCs.

## 2019-04-13 NOTE — PROGRESS NOTES
Gastroenterology Progress Note:    Ting-Christy Hi  Date & Time note created:    4/13/2019   10:21 AM     Patient ID:  Name:             Rhoda Gaines  YOB: 1940  Age:                 78 y.o.  female  MRN:               [id number]    Referring MD:  Dr. Wade                                                             Chief Complaint(s):      Anemia, suspect obscure GI bleeding    History of Present Illness:    This is a very pleasant 78 y.o. female with the past medical history as listed below.    ===Dr. Jurado's consult 4/11/19===  This patient was seen by request of Dr. Sanchez for melena and anemia.  Patient is a poor historian with limited insight into her disease process.  Patient was transferred from [facility] for concern for upper GI bleed.  Is no history of atrial fibrillation as well as congestive heart failure recently admitted to hospital in [place] for heart failure and per ER records was discharged on anticoagulation.  Patient reports noticing black stool for few weeks ever since she started taking iron supplement secondary to fatigue.  There was no accompanying records however based on ER records patient's baseline was around 9 current admission show hemoglobin of 7.9 Hemoccult positive at outside facility.  Patient was thus transfer for further evaluation as patient also has shortness of breath.  Patient did receive 2 units of blood per ER transfer record.  Denies any hematemesis hemoptysis.  Outside evaluation also show elevated troponin to 0.763 with also elevated creatinine function and potassium.  Patient currently reports the shortness of breath otherwise without any complaints.  Patient has not had any bowel movement since arrival at the emergency room.  Denies any jp abdominal pain.  ===  4/12/19 EGD:  Esophagus: grossly  Normal  Stomach: 4 cm hiatal hernia, 36-40 cm; gastritis in antrum s/p cold forceps biopsy   Duodenum: grossly normal, s/p cold forceps biopsy   No fresh  blood nor blood clot was seen in the entire exam.    4/13/2019 Doing ok. No BM since admission. H/H continues to drop. No h/o colonoscopy.      Review of Systems:      Constitutional: Denies fevers, weight changes  Eyes: Denies changes in vision, jaundice  Ears/Nose/Throat/Mouth: Denies nasal congestion or sore throat   Cardiovascular: Denies chest pain or palpitations   Respiratory: Denies shortness of breath, denies cough  Gastrointestinal/Hepatic: Denies abdominal pain, nausea, vomiting, diarrhea, constipation, pos GI bleeding   Genitourinary: Denies dysuria or frequency  Musculoskeletal/Rheum: Denies  joint pain and swelling, edema  Skin: Denies rash  Neurological: Denies headache, confusion, memory loss or focal weakness/parasthesias  Psychiatric: denies mood disorder   Endocrine: Yvette thyroid problems  Heme/Oncology/Lymph Nodes: Denies enlarged lymph nodes, denies brusing or known bleeding disorder  All other systems were reviewed and are negative (AMA/CMS criteria)              Past Medical History:   Past Medical History:   Diagnosis Date   • A-fib (HCC)    • Chronic obstructive pulmonary disease (HCC)    • Hypertension    • Psychiatric disorder     depression   • Thyroid disease      Active Hospital Problems    Diagnosis   • Acute upper GI bleeding [K92.2]     Priority: High   • Hemorrhagic disorder due to extrinsic circulating anticoagulants ([facility]) [D68.32]     Priority: Medium   • Acute blood loss anemia from GI bleed [D62]     Priority: Medium   • Atrial fibrillation with rapid ventricular response ([facility]) [I48.91]     Priority: Medium   • SHYAM (acute kidney injury) ([facility]) [N17.9]     Priority: Medium   • Hyperkalemia [E87.5]     Priority: Medium   • Elevated troponin [R74.8]     Priority: Medium   • Obesity [E66.9]     Priority: Low   • HTN (hypertension) [I10]     Priority: Low   • Hypothyroidism [E03.9]     Priority: Low   • COPD (chronic obstructive pulmonary disease) ([facility]) [J44.9]     Priority: Low   •  HLD (hyperlipidemia) [E78.5]     Priority: Low       Past Surgical History:  Past Surgical History:   Procedure Laterality Date   • GASTROSCOPY-ENDO N/A 4/12/2019    Procedure: GASTROSCOPY;  Surgeon: Bradly Do M.D.;  Location: SURGERY [facility];  Service: Gastroenterology   • OTHER ORTHOPEDIC SURGERY      [facility] Medications:  Current Facility-Administered Medications   Medication Dose Frequency Provider Last Rate Last Dose   • peg-electrolyte solution (MOVIPREP) package 100 g  100 g Once Bradly Do M.D.       • omeprazole (PRILOSEC) capsule 20 mg  20 mg DAILY Yanick Wade M.D.       • NS infusion   Continuous Yanick Wade M.D. 60 mL/hr at 04/13/19 0848     • DILTIAZem (CARDIZEM) tablet 60 mg  60 mg Q8HRS Anant Burk, A.P.N.   Stopped at 04/12/19 2200   • hydrALAZINE (APRESOLINE) injection 10 mg  10 mg Q6HRS PRN Yanick Wade M.D.       • levothyroxine (SYNTHROID) tablet 50 mcg  50 mcg AM ES Naty Arriola M.D.   50 mcg at 04/13/19 0505   • buPROPion SR (WELLBUTRIN-SR) tablet 200 mg  200 mg BID Naty Arriola M.D.   200 mg at 04/13/19 0505   • budesonide-formoterol (SYMBICORT) 80-4.5 MCG/ACT inhaler 1 Puff  1 Puff BID Naty Arriola M.D.   1 Puff at 04/13/19 0505   • atorvastatin (LIPITOR) tablet 20 mg  20 mg Nightly Naty Arriola M.D.   20 mg at 04/12/19 2037   • senna-docusate (PERICOLACE or SENOKOT S) 8.6-50 MG per tablet 2 Tab  2 Tab BID Naty Arriola M.D.   2 Tab at 04/13/19 0505    And   • polyethylene glycol/lytes (MIRALAX) PACKET 1 Packet  1 Packet QDAY PRN Naty Arriola M.D.        And   • magnesium hydroxide (MILK OF MAGNESIA) suspension 30 mL  30 mL QDAY PRN Naty Arriola M.D.        And   • bisacodyl (DULCOLAX) suppository 10 mg  10 mg QDAY PRN Naty Arriola M.D.       • Respiratory Care per Protocol   Continuous RT Naty Arriola M.D.       • ondansetron (ZOFRAN) syringe/vial injection 4 mg  4 mg Q4HRS PRN Naty Arriola M.D.   4 mg at 04/12/19  0043   • ondansetron (ZOFRAN ODT) dispertab 4 mg  4 mg Q4HRS PRN Naty Arriola M.D.       • albuterol inhaler 2 Puff  2 Puff Q4HRS PRN Naty Arriola M.D.       • levalbuterol (XOPENEX) 0.63 MG/3ML nebulizer solution 0.63 mg  0.63 mg Q4H PRN (RT) Edgard Lin M.D.         Last reviewed on 4/11/2019  7:23 PM by Madai Garnett [facility]    Current Outpatient Medications:  Prescriptions Prior to Admission   Medication Sig Dispense Refill Last Dose   • diclofenac EC (VOLTAREN) 50 MG Tablet Delayed Response Take 50 mg by mouth 1 time daily as needed (Pain).   UNK at PRN   • hydroCHLOROthiazide (HYDRODIURIL) 25 MG Tab Take 25 mg by mouth every day.   4/11/2019 at AM   • levothyroxine (SYNTHROID) 50 MCG Tab Take 50 mcg by mouth Every morning on an empty stomach.   4/11/2019 at AM   • raNITidine (ZANTAC) 150 MG Tab Take 150 mg by mouth 2 times a day as needed for Heartburn.   UNK at PRN   • budesonide-formoterol (SYMBICORT) 80-4.5 MCG/ACT Aerosol Inhale 1 Puff by mouth 2 Times a Day.   4/11/2019 at AM   • buPROPion (WELLBUTRIN SR) 200 MG SR tablet Take 200 mg by mouth 2 times a day.   4/11/2019 at AM   • potassium chloride SA (KDUR) 20 MEQ Tab CR Take 40 mEq by mouth 2 times a day.   4/11/2019 at AM   • polyethylene glycol/lytes (MIRALAX) Pack Take 17 g by mouth 1 time daily as needed (Constipation).   UNK at PRN   • DILTIAZem (CARDIZEM) 60 MG Tab Take 60 mg by mouth every 8 hours.   4/11/2019 at AM   • aspirin EC (ECOTRIN) 81 MG Tablet Delayed Response Take 81 mg by mouth every day.   4/11/2019 at AM   • apixaban (ELIQUIS) 5mg Tab Take 5 mg by mouth 2 Times a Day.   4/11/2019 at AM   • magnesium oxide (MAG-OX) 400 MG Tab Take 400 mg by mouth every day.   4/11/2019 at AM   • atorvastatin (LIPITOR) 20 MG Tab Take 20 mg by mouth every evening.   4/10/2019 at PM   • omeprazole (PRILOSEC) 20 MG delayed-release capsule Take 20 mg by mouth every day.   4/11/2019 at AM   • furosemide (LASIX) 20 MG Tab Take 20 mg by mouth 1  time daily as needed (Fluid Retention).   4/11/2019 at AM   • lisinopril (PRINIVIL) 2.5 MG Tab Take 2.5 mg by mouth every day.   4/11/2019 at AM   • ipratropium-albuterol (DUONEB) 0.5-2.5 (3) MG/3ML nebulizer solution 3 mL by Nebulization route 4 times a day.   4/11/2019 at AM   • albuterol 108 (90 Base) MCG/ACT Aero Soln inhalation aerosol Inhale 2 Puffs by mouth every 6 hours as needed for Shortness of Breath.   UNK at PRN   • docusate sodium (COLACE) 100 MG Cap Take 100 mg by mouth 2 times a day as needed for Constipation.   <week at PRN       Medication Allergy:  No Known Allergies    Physical Exam:  Weight/BMI: Body mass index is 39.18 kg/m².  BP (!) 94/56   Pulse (!) 115   Temp 36.2 °C (97.1 °F) (Temporal)   Resp 18   Ht 1.524 m (5')   Wt 91 kg (200 lb 9.9 oz)   SpO2 98%   Vitals:    04/13/19 0401 04/13/19 0730 04/13/19 0742 04/13/19 0757   BP: (!) 99/52 (!) 87/63 (!) 91/61 (!) 94/56   Pulse: (!) 115 (!) 119 (!) 123 (!) 115   Resp: 16 20 20 18   Temp: 36.4 °C (97.6 °F) (!) 2.4 °C (36.3 °F) 36.2 °C (97.2 °F) 36.2 °C (97.1 °F)   TempSrc: Temporal  Temporal Temporal   SpO2: 100% 100% 100% 98%   Weight:       Height:         Oxygen Therapy:  Pulse Oximetry: 98 %, O2 (LPM): 3, O2 Delivery: Silicone Nasal Cannula    Intake/Output Summary (Last 24 hours) at 04/13/19 1021  Last data filed at 04/13/19 0708   Gross per 24 hour   Intake          1985.48 ml   Output           1900.5 ml   Net            84.98 ml       Constitutional:   Well developed, well nourished, no acute distress  HEENT:  Normocephalic, Atraumatic, Conjunctiva mild pale, Sclera not icteric, Oropharynx moist mucous membranes, No oral exudates, Nose normal.  No thyromegaly.  Neck:  Normal range of motion, No cervical tenderness,  no JVD.  Chest/Lungs:  Symmetric expansion, no spider angioma, breath sounds clear to auscultation bilaterally,  no crackles, no wheezing.   Cardiovascular:  Normal heart rate, Normal rhythm, No murmurs, No rubs, No  gallops.    Abdomen: Bowel sounds normal, Soft, No tenderness, No guarding, No rebound, No masses, No hepatosplenomegaly.  Extremities: No cyanosis/clubbing/edema/palmar erythema/flapping tremor  Skin: Warm, Dry, No erythema, No rash, no induration.    MDM (Data Review):     Records reviewed and summarized in current documentation    Lab Data Review:  Recent Results (from the past 24 hour(s))   HGB (Hemoglobin) for 48 hours    Collection Time: 04/12/19 11:48 AM   Result Value Ref Range    Hemoglobin 8.1 (L) 12.0 - 16.0 g/dL   POTASSIUM SERUM (K)    Collection Time: 04/12/19 11:48 AM   Result Value Ref Range    Potassium 5.0 3.6 - 5.5 mmol/L   EC-ECHOCARDIOGRAM COMPLETE W/O CONT    Collection Time: 04/12/19 11:48 AM   Result Value Ref Range    Eject.Frac. MOD BP 36.79     Eject.Frac. MOD 4C 43.32     Eject.Frac. MOD 2C 33.03     Left Ventrical Ejection Fraction 25    HGB (Hemoglobin) for 48 hours    Collection Time: 04/12/19  7:53 PM   Result Value Ref Range    Hemoglobin 7.7 (L) 12.0 - 16.0 g/dL   DIGOXIN    Collection Time: 04/13/19  3:18 AM   Result Value Ref Range    Digoxin 1.7 0.8 - 2.0 ng/mL   CBC WITH DIFFERENTIAL    Collection Time: 04/13/19  3:18 AM   Result Value Ref Range    WBC 9.2 4.8 - 10.8 K/uL    RBC 2.25 (L) 4.20 - 5.40 M/uL    Hemoglobin 7.0 (L) 12.0 - 16.0 g/dL    Hematocrit 23.1 (L) 37.0 - 47.0 %    .7 (H) 81.4 - 97.8 fL    MCH 31.1 27.0 - 33.0 pg    MCHC 30.3 (L) 33.6 - 35.0 g/dL    RDW 66.9 (H) 35.9 - 50.0 fL    Platelet Count 267 164 - 446 K/uL    MPV 10.1 9.0 - 12.9 fL    Neutrophils-Polys 80.50 (H) 44.00 - 72.00 %    Lymphocytes 7.50 (L) 22.00 - 41.00 %    Monocytes 9.50 0.00 - 13.40 %    Eosinophils 0.30 0.00 - 6.90 %    Basophils 0.20 0.00 - 1.80 %    Immature Granulocytes 2.00 (H) 0.00 - 0.90 %    Nucleated RBC 0.90 /100 WBC    Neutrophils (Absolute) 7.41 (H) 2.00 - 7.15 K/uL    Lymphs (Absolute) 0.69 (L) 1.00 - 4.80 K/uL    Monos (Absolute) 0.87 (H) 0.00 - 0.85 K/uL    Eos  (Absolute) 0.03 0.00 - 0.51 K/uL    Baso (Absolute) 0.02 0.00 - 0.12 K/uL    Immature Granulocytes (abs) 0.18 (H) 0.00 - 0.11 K/uL    NRBC (Absolute) 0.08 K/uL   Basic Metabolic Panel    Collection Time: 19  3:18 AM   Result Value Ref Range    Sodium 142 135 - 145 mmol/L    Potassium 4.1 3.6 - 5.5 mmol/L    Chloride 110 96 - 112 mmol/L    Co2 23 20 - 33 mmol/L    Glucose 76 65 - 99 mg/dL    Bun 69 (H) 8 - 22 mg/dL    Creatinine 1.71 (H) 0.50 - 1.40 mg/dL    Calcium 7.8 (L) 8.5 - 10.5 mg/dL    Anion Gap 9.0 0.0 - 11.9   ESTIMATED GFR    Collection Time: 19  3:18 AM   Result Value Ref Range    GFR If African American 35 (A) >60 mL/min/1.73 m 2    GFR If Non  29 (A) >60 mL/min/1.73 m 2   COD - Adult (Type and Screen)    Collection Time: 19  3:18 AM   Result Value Ref Range    ABO Grouping Only A     Rh Grouping Only POS     Antibody Screen-Cod NEG     Component R       R4I                 RedBloodCellsIRR    [id number]   issued       19   07:26      Product Type Red Blood Cells IRR LR  AS-3     Dispense Status Issued     Unit Number (Barcoded) [id number]     Product Code (Barcoded) N3010S18     Blood Type (Barcoded) 5100    Histology Request    Collection Time: 19  4:30 AM   Result Value Ref Range    Pathology Request Sent to Histo    EKG    Collection Time: 19  6:00 AM   Result Value Ref Range    Report       [facility]    Test Date:  2019  Pt Name:    MANDI DUDLEY                 Department: 183  MRN:        [id number]                      Room:       T801  Gender:     Female                       Technician: SHUN  :        [id number]                   Requested By:DOROTHEA VASQUEZ  Order #:    [id number]                    Reading MD: Dorothea Vasquez MD    Measurements  Intervals                                Axis  Rate:       105                          P:  AR:                                      QRS:        76  QRSD:       87                            T:          244  QT:         322  QTc:        426    Interpretive Statements  ATRIAL FIBRILLATION  REPOL ABNRM SUGGESTS ISCHEMIA, DIFFUSE LEADS  Compared to ECG 04/11/2019 18:04:52  NO SIGNIFICANT CHANGE  Electronically Signed On 4- 7:01:14 PDT by Dorothea Kenney MD         MDM (Assessment and Plan):     Active Hospital Problems    Diagnosis   • Acute upper GI bleeding [K92.2]     Priority: High   • Hemorrhagic disorder due to extrinsic circulating anticoagulants (HCC) [D68.32]     Priority: Medium   • Acute blood loss anemia from GI bleed [D62]     Priority: Medium   • Atrial fibrillation with rapid ventricular response (HCC) [I48.91]     Priority: Medium   • SHYAM (acute kidney injury) (HCC) [N17.9]     Priority: Medium   • Hyperkalemia [E87.5]     Priority: Medium   • Elevated troponin [R74.8]     Priority: Medium   • Obesity [E66.9]     Priority: Low   • HTN (hypertension) [I10]     Priority: Low   • Hypothyroidism [E03.9]     Priority: Low   • COPD (chronic obstructive pulmonary disease) (HCC) [J44.9]     Priority: Low   • HLD (hyperlipidemia) [E78.5]     Priority: Low       Imaging/Procedures Review:    4/11/19 CT A/P:  1.  Ventral abdominal wall hernia containing loop of sigmoid colon with no evidence of obstruction.  2.  Right adrenal mass demonstrates characteristics consistent with adrenal adenoma.  3.  There is diverticulosis.  4.  Renal cysts are identified.  5.  Calcific atherosclerosis.    4/12/19 LVEF: 25%    Assessment  # Anemia, questionable GI blood loss  # Gastritis  # Hiatal hernia  # Elevated troponin 1.06  # Hyperkalemia  # Elevated Creatinine, elevated BUN; unclear baseline  # Ventral hernia with sigmoid colon  # Diverticulosis  # Adrenal Adenoma  # Tachycadia  # Atrial fibrillation on anticoagulation  # Congestive Heart Failure  # Shortness of breath  # Hypothyroidism    Plan  - Serial H/H monitoring, keeping HgB > 7 but if in setting of ischemia would recommend keeping it  > 8. Transfusion as per team  - Omeprazole 40 mg daily AC  - Moviprep X 1 to check stool  - Collect stool occult blood  - NM RBC scan stat, high risk for colonoscopy due to her heart condition    Thank you very much for allowing me to participate in the care of your patient.  Please feel free to contact me anytime at [phone].     Bradly Do M.D.    Core Quality Measures   Reviewed items::  Labs, Medications and Radiology reports reviewed

## 2019-04-13 NOTE — PROCEDURES
Vascular Access Team    Date of Insertion: 4/13/19  Arm Circumference: n/a  Line Length: 8  Line Size: 18  Vein Occupancy %: 36  Reason for Midline: access  Labs: WBC 9.2, , BUN 69, Cr 1.71, GFR 29, INR 2.23 on 4/11/19    Orders confirmed, vessel patency confirmed with ultrasound. Risks and benefits of procedure explained to patient and education regarding line associated bloodstream infections provided. Questions answered.     PowerGlide Midline placed in RUE per MD order with ultrasound guidance. 18g, 8 cm line placed in basilic vein after 1 attempt(s).  Catheter inserted with good blood return. Secured with 0cm external from insertion site.  Flushed without resistance with 10 mL 0.9% normal saline. Midline secured with Biopatch and Tegaderm.     Midline placement is confirmed by nurse using ultrasound and ability to flush and draw blood. Midline is appropriate for use at this time.  No X-ray is needed for placement confirmation. Pt tolerated procedure well.  Patient condition relayed to unit RN or ordering physician via this post procedure note in the EMR.    Ultrasound images uploaded to PACS and viewable in the EMR - yes  Ultrasound imaged printed and placed in paper chart - no      BARD PowerGlide Midline ref # [id number], Lot # [id number]

## 2019-04-13 NOTE — PROGRESS NOTES
Hospital Medicine Daily Progress Note    Date of Service  4/13/2019    Chief Complaint  Weakness, poor appetite, nausea, melena    Hospital Course    78 y.o. female with COPD, hypertension, hypothyroidism, atrial fibrillation on anticoagulation with Eliquis, admitted 4/11/2019 with weakness and poor appetite, nausea with retching but no vomiting, and report of persistent black stools and melena.  Initial outside hospital workup showed mildly elevated troponin of 0.7, with hemoglobin of 7.9, potassium of 6.5, and creatinine of 2.25.  Chest x-ray showed blunting of the right costophrenic angle with no consolidations, and moderate cardiomegaly.  Repeat labs here showed WBC of 12,100, and hemoglobin of 8.9, with potassium 6.2, and creatinine of 2.01.  CT of the abdomen showed ventral abdominal wall hernia containing loop of sigmoid colon with no evidence of obstruction.  She was felt to have GI bleed.  Her anticoagulation was held, and she started on PPI drip. GI was consulted.  She is also noted to be tachycardic, felt to be related to her worsening anemia.  She is started on volume expansion with IV fluids, and was given one-time dose of Cardizem.  She was given calcium gluconate, Lasix, D50 for her hyperkalemia.  Potassium improved.  Troponin peaked at 1.06.  Cardiology was consulted, who felt that the troponin elevation was demand ischemia.  She was cleared for endoscopy.           Interval Problem Update  4/13/2019 - I reviewed the patient's chart. There were no significant overnight events. Remains hemodynamically stable albeit BP soft. Afebrile. Stable on 3L O2 NC.  Hemoglobin down to 7.0 this morning.  No leukocytosis.  Creatinine improved to 1.71.  Her sodium and potassium are normal.  Digoxin level is normal.  Echocardiogram showed EF of 25%, with moderate MR, with no prior study available for comparison.  She underwent EGD yesterday, which showed normal esophagus, with 4 cm hiatal hernia, with gastritis in  the antrum which was biopsied, with grossly normal duodenum.  She was transfused 1 unit of packed RBC this morning    > I have personally seen and examined the patient today.  She states she is feeling okay.  She is not more short of breath than her baseline.  She does not look puffy.  No chest pain or shortness of breath.  No nausea or vomiting.  No more abdominal discomfort.      Consultants/Specialty  GI  Cardiology    Code Status  Full    Disposition  Monitor on telemetry.  Low 6 clicks scores.  Lives alone in [place]. Pending PT/OT evaluation.    Review of Systems  ROS     Pertinent positives/negatives as mentioned above.     A complete review of systems was personally done by me. All other systems were negative.       Physical Exam  Temp:  [2.4 °C (36.3 °F)-37.1 °C (98.7 °F)] 36.2 °C (97.1 °F)  Pulse:  [] 115  Resp:  [16-23] 18  BP: ()/() 94/56  SpO2:  [93 %-100 %] 98 %    Physical Exam   Constitutional: She is oriented to person, place, and time. She appears well-developed. No distress.   Obese. Body mass index is 38.88 kg/m².   HENT:   Head: Normocephalic and atraumatic.   Mouth/Throat: No oropharyngeal exudate.   Eyes: Pupils are equal, round, and reactive to light. Conjunctivae are normal. No scleral icterus.   Neck: Normal range of motion. Neck supple.   Cardiovascular: Exam reveals no gallop and no friction rub.    Irregular rhythm, tachycardic.  No murmurs.   Pulmonary/Chest: Effort normal. No respiratory distress. She has no wheezes. She has no rales. She exhibits no tenderness.   Diminished air entry B/L bases, otherwise clear to auscultation   Abdominal: Soft. She exhibits no distension. There is no rebound and no guarding.   No epigastric tenderness.   Musculoskeletal: She exhibits no edema or tenderness.   Lymphadenopathy:     She has no cervical adenopathy.   Neurological: She is alert and oriented to person, place, and time. No cranial nerve deficit.   Skin: Skin is warm and dry.  No rash noted. She is not diaphoretic. No erythema. No pallor.   Psychiatric: She has a normal mood and affect. Her behavior is normal. Judgment and thought content normal.   Nursing note and vitals reviewed.        Fluids    Intake/Output Summary (Last 24 hours) at 04/13/19 1018  Last data filed at 04/13/19 0708   Gross per 24 hour   Intake          1985.48 ml   Output           1900.5 ml   Net            84.98 ml       Laboratory  Recent Labs      04/11/19   1815   04/12/19   1148  04/12/19   1953  04/13/19   0318   WBC  12.1*   --    --    --   9.2   RBC  2.93*   --    --    --   2.25*   HEMOGLOBIN  8.9*   < >  8.1*  7.7*  7.0*   HEMATOCRIT  29.1*   --    --    --   23.1*   MCV  99.3*   --    --    --   102.7*   MCH  30.4   --    --    --   31.1   MCHC  30.6*   --    --    --   30.3*   RDW  63.6*   --    --    --   66.9*   PLATELETCT  301   --    --    --   267   MPV  10.7   --    --    --   10.1    < > = values in this interval not displayed.     Recent Labs      04/11/19   1815 04/11/19   2356  04/12/19   0404  04/12/19   1148  04/13/19   0318   SODIUM  134*  135   --    --   142   POTASSIUM  6.2*  5.5  5.6*  5.0  4.1   CHLORIDE  104  105   --    --   110   CO2  21  20   --    --   23   GLUCOSE  115*  106*   --    --   76   BUN  98*  93*   --    --   69*   CREATININE  2.01*  2.05*   --    --   1.71*   CALCIUM  8.3*  8.7   --    --   7.8*     Recent Labs      04/11/19 1815   INR  2.23*               Imaging  DX-CHEST-PORTABLE (1 VIEW)   Final Result         Diffuse interstitial prominence could relate to mild pulmonary edema.      Trace pleural effusions.      Cardiomegaly.      EC-ECHOCARDIOGRAM COMPLETE W/O CONT   Final Result      CT-ABDOMEN-PELVIS W/O   Final Result      1.  Ventral abdominal wall hernia containing loop of sigmoid colon with no evidence of obstruction.      2.  Right adrenal mass demonstrates characteristics consistent with adrenal adenoma.      3.  There is diverticulosis.      4.  Renal  cysts are identified.      5.  Calcific atherosclerosis.      NM-GI BLEEDING SCAN    (Results Pending)        Assessment/Plan  * Acute upper GI bleeding- (present on admission)   Assessment & Plan    -EGD showed gastritis.  -Follow biopsy results.  -Change PPI to oral Prilosec.  -Continue to monitor H&H closely, transfuse as necessary with restrictive transfusion strategy.  -Close hemodynamic monitoring on telemetry.     Acute blood loss anemia from GI bleed- (present on admission)   Assessment & Plan    -Receiving 1 unit of packed RBC.  Repeat hemoglobin posttransfusion.  Continue to monitor hemoglobin closely, transfuse if drops below 7, or below 8 if symptomatic.     Hemorrhagic disorder due to extrinsic circulating anticoagulants (HCC)- (present on admission)   Assessment & Plan    -Hold anticoagulants for now.  Management of GI bleed as above.     Elevated troponin- (present on admission)   Assessment & Plan    -Suspect this is type II NSTEMI due to demand ischemia from GI bleed, anemia, and tachycardia with RVR.  Troponin has trended down.  -Continue to holding antiplatelets and anticoagulants due to GI bleed.  -Cardiology on board, with further inputs. ?  Ischemic workup prior to discharge.  -Monitor on telemetry.     Hyperkalemia- (present on admission)   Assessment & Plan    -Patient received hyperkalemia cocktail, with improvement in potassium level.  This is likely related from volume depletion, and acute kidney injury in setting of being on lisinopril.  Potassium has normalized.  -Continue hydration.  -Continue to monitor renal function, and potassium levels.  BMP in the morning.  -Monitor on telemetry.     SHYAM (acute kidney injury) (HCC)- (present on admission)   Assessment & Plan    -No baseline creatinine in our system.  Creatinine improved with IV fluids, and also getting blood transfusion.  She continues to be dry. Suspect this is prerenal.  -Continue hydration with IV normal saline, decrease rate  to 60 cc/h given her low EF.  Continue to hold lisinopril.    - avoid nephrotoxins, and continue to renally dose all medications.     Atrial fibrillation with rapid ventricular response (HCC)- (present on admission)   Assessment & Plan    -Suspect this is due to anemia, and volume depletion.   -Continue digoxin.  Continue Cardizem, although limited by her soft blood pressure.  Cardiology following.  Holding anticoagulation due to GI bleed and acute blood loss anemia.  -Continue hydration with IV fluids.  Monitor hemoglobin, and transfuse as necessary.  -Continue to monitor on telemetry.       Obesity- (present on admission)   Assessment & Plan    - Body mass index is 38.88 kg/m²..  - outpatient referral for outpatient weight management.     HLD (hyperlipidemia)- (present on admission)   Assessment & Plan    -Continue home dose Lipitor.     COPD (chronic obstructive pulmonary disease) (HCC)- (present on admission)   Assessment & Plan    -Not in acute exacerbation.  Continue home Symbicort, along with bronchodilators per RT protocol.     Hypothyroidism- (present on admission)   Assessment & Plan    -TSH is normal. Continue home dose Synthroid.     HTN (hypertension)- (present on admission)   Assessment & Plan    -Blood pressure soft, likely due to volume depletion.  -Continue Cardizem with holding parameter. Continue to hold lisinopril and hydrochlorothiazide due to initial hyperkalemia and acute kidney injury.   -Continue to monitor blood pressure trend closely, with PRN IV hydralazine for significant hypertension.          VTE prophylaxis: SCD.  Anticoagulation on hold due to GI bleed

## 2019-04-14 ENCOUNTER — APPOINTMENT (OUTPATIENT)
Dept: CARDIOLOGY | Facility: MEDICAL CENTER | Age: 79
DRG: 813 | End: 2019-04-14
Attending: NURSE PRACTITIONER
Payer: MEDICARE

## 2019-04-14 PROBLEM — E87.0 HYPERNATREMIA: Status: ACTIVE | Noted: 2019-04-14

## 2019-04-14 PROBLEM — E87.6 HYPOKALEMIA: Status: ACTIVE | Noted: 2019-04-14

## 2019-04-14 LAB
ANION GAP SERPL CALC-SCNC: 8 MMOL/L (ref 0–11.9)
BASOPHILS # BLD AUTO: 0.1 % (ref 0–1.8)
BASOPHILS # BLD: 0.01 K/UL (ref 0–0.12)
BUN SERPL-MCNC: 39 MG/DL (ref 8–22)
CALCIUM SERPL-MCNC: 7.7 MG/DL (ref 8.5–10.5)
CHLORIDE SERPL-SCNC: 112 MMOL/L (ref 96–112)
CO2 SERPL-SCNC: 27 MMOL/L (ref 20–33)
CREAT SERPL-MCNC: 1.22 MG/DL (ref 0.5–1.4)
EOSINOPHIL # BLD AUTO: 0.08 K/UL (ref 0–0.51)
EOSINOPHIL NFR BLD: 1 % (ref 0–6.9)
ERYTHROCYTE [DISTWIDTH] IN BLOOD BY AUTOMATED COUNT: 70.4 FL (ref 35.9–50)
GLUCOSE SERPL-MCNC: 87 MG/DL (ref 65–99)
HCT VFR BLD AUTO: 24.9 % (ref 37–47)
HGB BLD-MCNC: 7.7 G/DL (ref 12–16)
IMM GRANULOCYTES # BLD AUTO: 0.2 K/UL (ref 0–0.11)
IMM GRANULOCYTES NFR BLD AUTO: 2.5 % (ref 0–0.9)
LV EJECT FRACT  99904: 25
LV EJECT FRACT MOD 2C 99903: 38.84
LV EJECT FRACT MOD 4C 99902: 37.52
LV EJECT FRACT MOD BP 99901: 35.96
LYMPHOCYTES # BLD AUTO: 0.63 K/UL (ref 1–4.8)
LYMPHOCYTES NFR BLD: 8 % (ref 22–41)
MAGNESIUM SERPL-MCNC: 2.1 MG/DL (ref 1.5–2.5)
MCH RBC QN AUTO: 30.6 PG (ref 27–33)
MCHC RBC AUTO-ENTMCNC: 30.9 G/DL (ref 33.6–35)
MCV RBC AUTO: 98.8 FL (ref 81.4–97.8)
MONOCYTES # BLD AUTO: 0.74 K/UL (ref 0–0.85)
MONOCYTES NFR BLD AUTO: 9.4 % (ref 0–13.4)
NEUTROPHILS # BLD AUTO: 6.19 K/UL (ref 2–7.15)
NEUTROPHILS NFR BLD: 79 % (ref 44–72)
NRBC # BLD AUTO: 0.04 K/UL
NRBC BLD-RTO: 0.5 /100 WBC
PLATELET # BLD AUTO: 277 K/UL (ref 164–446)
PMV BLD AUTO: 9.8 FL (ref 9–12.9)
POTASSIUM SERPL-SCNC: 3.3 MMOL/L (ref 3.6–5.5)
RBC # BLD AUTO: 2.52 M/UL (ref 4.2–5.4)
SODIUM SERPL-SCNC: 147 MMOL/L (ref 135–145)
WBC # BLD AUTO: 7.9 K/UL (ref 4.8–10.8)

## 2019-04-14 PROCEDURE — 700102 HCHG RX REV CODE 250 W/ 637 OVERRIDE(OP): Performed by: NURSE PRACTITIONER

## 2019-04-14 PROCEDURE — A9270 NON-COVERED ITEM OR SERVICE: HCPCS | Performed by: INTERNAL MEDICINE

## 2019-04-14 PROCEDURE — 99233 SBSQ HOSP IP/OBS HIGH 50: CPT | Performed by: INTERNAL MEDICINE

## 2019-04-14 PROCEDURE — 700102 HCHG RX REV CODE 250 W/ 637 OVERRIDE(OP): Performed by: HOSPITALIST

## 2019-04-14 PROCEDURE — 97162 PT EVAL MOD COMPLEX 30 MIN: CPT

## 2019-04-14 PROCEDURE — 700105 HCHG RX REV CODE 258: Performed by: INTERNAL MEDICINE

## 2019-04-14 PROCEDURE — 700102 HCHG RX REV CODE 250 W/ 637 OVERRIDE(OP): Performed by: INTERNAL MEDICINE

## 2019-04-14 PROCEDURE — A9270 NON-COVERED ITEM OR SERVICE: HCPCS | Performed by: NURSE PRACTITIONER

## 2019-04-14 PROCEDURE — 97166 OT EVAL MOD COMPLEX 45 MIN: CPT

## 2019-04-14 PROCEDURE — 770020 HCHG ROOM/CARE - TELE (206)

## 2019-04-14 PROCEDURE — 93308 TTE F-UP OR LMTD: CPT

## 2019-04-14 PROCEDURE — A9270 NON-COVERED ITEM OR SERVICE: HCPCS | Performed by: HOSPITALIST

## 2019-04-14 PROCEDURE — 93308 TTE F-UP OR LMTD: CPT | Mod: 26 | Performed by: INTERNAL MEDICINE

## 2019-04-14 PROCEDURE — 80048 BASIC METABOLIC PNL TOTAL CA: CPT

## 2019-04-14 PROCEDURE — 83735 ASSAY OF MAGNESIUM: CPT

## 2019-04-14 PROCEDURE — 85025 COMPLETE CBC W/AUTO DIFF WBC: CPT

## 2019-04-14 RX ORDER — POTASSIUM CHLORIDE 20 MEQ/1
40 TABLET, EXTENDED RELEASE ORAL ONCE
Status: COMPLETED | OUTPATIENT
Start: 2019-04-14 | End: 2019-04-14

## 2019-04-14 RX ORDER — SODIUM CHLORIDE 450 MG/100ML
INJECTION, SOLUTION INTRAVENOUS CONTINUOUS
Status: DISCONTINUED | OUTPATIENT
Start: 2019-04-14 | End: 2019-04-15

## 2019-04-14 RX ADMIN — OMEPRAZOLE 40 MG: 20 CAPSULE, DELAYED RELEASE ORAL at 05:08

## 2019-04-14 RX ADMIN — ATORVASTATIN CALCIUM 20 MG: 20 TABLET, FILM COATED ORAL at 20:09

## 2019-04-14 RX ADMIN — DILTIAZEM HYDROCHLORIDE 60 MG: 30 TABLET, FILM COATED ORAL at 05:08

## 2019-04-14 RX ADMIN — LEVOTHYROXINE SODIUM 50 MCG: 50 TABLET ORAL at 05:08

## 2019-04-14 RX ADMIN — THERA TABS 1 TABLET: TAB at 05:08

## 2019-04-14 RX ADMIN — SODIUM CHLORIDE: 4.5 INJECTION, SOLUTION INTRAVENOUS at 08:50

## 2019-04-14 RX ADMIN — BUDESONIDE AND FORMOTEROL FUMARATE DIHYDRATE 1 PUFF: 80; 4.5 AEROSOL RESPIRATORY (INHALATION) at 17:50

## 2019-04-14 RX ADMIN — BUDESONIDE AND FORMOTEROL FUMARATE DIHYDRATE 1 PUFF: 80; 4.5 AEROSOL RESPIRATORY (INHALATION) at 05:08

## 2019-04-14 RX ADMIN — METOPROLOL TARTRATE 25 MG: 25 TABLET ORAL at 17:50

## 2019-04-14 RX ADMIN — POTASSIUM CHLORIDE 40 MEQ: 1500 TABLET, EXTENDED RELEASE ORAL at 08:46

## 2019-04-14 RX ADMIN — BUPROPION HYDROCHLORIDE 200 MG: 100 TABLET, FILM COATED, EXTENDED RELEASE ORAL at 17:49

## 2019-04-14 RX ADMIN — SENNOSIDES, DOCUSATE SODIUM 2 TABLET: 50; 8.6 TABLET, FILM COATED ORAL at 05:08

## 2019-04-14 RX ADMIN — BUPROPION HYDROCHLORIDE 200 MG: 100 TABLET, FILM COATED, EXTENDED RELEASE ORAL at 05:08

## 2019-04-14 ASSESSMENT — GAIT ASSESSMENTS
GAIT LEVEL OF ASSIST: MINIMAL ASSIST
DEVIATION: DECREASED BASE OF SUPPORT;OTHER (COMMENT)
ASSISTIVE DEVICE: FRONT WHEEL WALKER
DISTANCE (FEET): 25

## 2019-04-14 ASSESSMENT — COGNITIVE AND FUNCTIONAL STATUS - GENERAL
MOVING FROM LYING ON BACK TO SITTING ON SIDE OF FLAT BED: A LITTLE
DRESSING REGULAR LOWER BODY CLOTHING: A LOT
WALKING IN HOSPITAL ROOM: A LITTLE
HELP NEEDED FOR BATHING: A LOT
TURNING FROM BACK TO SIDE WHILE IN FLAT BAD: A LITTLE
MOVING TO AND FROM BED TO CHAIR: A LITTLE
SUGGESTED CMS G CODE MODIFIER MOBILITY: CK
DAILY ACTIVITIY SCORE: 16
CLIMB 3 TO 5 STEPS WITH RAILING: A LOT
DRESSING REGULAR UPPER BODY CLOTHING: A LITTLE
STANDING UP FROM CHAIR USING ARMS: A LITTLE
PERSONAL GROOMING: A LITTLE
SUGGESTED CMS G CODE MODIFIER DAILY ACTIVITY: CK
TOILETING: A LOT
MOBILITY SCORE: 17

## 2019-04-14 ASSESSMENT — ENCOUNTER SYMPTOMS
WHEEZING: 0
CHEST TIGHTNESS: 0
COUGH: 0
SHORTNESS OF BREATH: 0
STRIDOR: 0
CHOKING: 0
APNEA: 0

## 2019-04-14 ASSESSMENT — PATIENT HEALTH QUESTIONNAIRE - PHQ9
2. FEELING DOWN, DEPRESSED, IRRITABLE, OR HOPELESS: NOT AT ALL
SUM OF ALL RESPONSES TO PHQ9 QUESTIONS 1 AND 2: 0
1. LITTLE INTEREST OR PLEASURE IN DOING THINGS: NOT AT ALL

## 2019-04-14 ASSESSMENT — ACTIVITIES OF DAILY LIVING (ADL): TOILETING: INDEPENDENT

## 2019-04-14 NOTE — CARE PLAN
Problem: Safety  Goal: Will remain free from falls  Outcome: PROGRESSING AS EXPECTED  Patient will not fall during shift.    Problem: Venous Thromboembolism (VTW)/Deep Vein Thrombosis (DVT) Prevention:  Goal: Patient will participate in Venous Thrombosis (VTE)/Deep Vein Thrombosis (DVT)Prevention Measures  Outcome: PROGRESSING AS EXPECTED  Patient will not develop DVT during shift.    Problem: Skin Integrity  Goal: Risk for impaired skin integrity will decrease  Outcome: PROGRESSING AS EXPECTED  Patient will not have further skin breakdown during shift.

## 2019-04-14 NOTE — PROGRESS NOTES
Gastroenterology Progress Note:    Ting-Christy Hi  Date & Time note created:    4/14/2019   10:32 AM     Patient ID:  Name:             Rhoda Gaines  YOB: 1940  Age:                 78 y.o.  female  MRN:               [id number]    Referring MD:  Dr. Wade                                                             Chief Complaint(s):      Anemia, suspect obscure GI bleeding    History of Present Illness:    This is a very pleasant 78 y.o. female with the past medical history as listed below.    ===Dr. Jurado's consult 4/11/19===  This patient was seen by request of Dr. Sanchez for melena and anemia.  Patient is a poor historian with limited insight into her disease process.  Patient was transferred from [facility] for concern for upper GI bleed.  Is no history of atrial fibrillation as well as congestive heart failure recently admitted to hospital in [place] for heart failure and per ER records was discharged on anticoagulation.  Patient reports noticing black stool for few weeks ever since she started taking iron supplement secondary to fatigue.  There was no accompanying records however based on ER records patient's baseline was around 9 current admission show hemoglobin of 7.9 Hemoccult positive at outside facility.  Patient was thus transfer for further evaluation as patient also has shortness of breath.  Patient did receive 2 units of blood per ER transfer record.  Denies any hematemesis hemoptysis.  Outside evaluation also show elevated troponin to 0.763 with also elevated creatinine function and potassium.  Patient currently reports the shortness of breath otherwise without any complaints.  Patient has not had any bowel movement since arrival at the emergency room.  Denies any jp abdominal pain.  ===  4/12/19 EGD:  Esophagus: grossly  Normal  Stomach: 4 cm hiatal hernia, 36-40 cm; gastritis in antrum s/p cold forceps biopsy   Duodenum: grossly normal, s/p cold forceps biopsy   No fresh  blood nor blood clot was seen in the entire exam.    4/13/2019 Doing ok. No BM since admission. H/H continues to drop. No h/o colonoscopy.  4/13/2019 NM RBC bleeding scan: No evidence of gastrointestinal bleeding.  4/14/2019 Feeling fine. Nurse reported black or dark stool, with pos occult blood. The patient did not see it. Hb 7.7. The patient dislikes Moviprep.     Review of Systems:      Constitutional: Denies fevers, weight changes  Eyes: Denies changes in vision, jaundice  Ears/Nose/Throat/Mouth: Denies nasal congestion or sore throat   Cardiovascular: Denies chest pain or palpitations   Respiratory: Denies shortness of breath, denies cough  Gastrointestinal/Hepatic: Denies abdominal pain, nausea, vomiting, diarrhea, constipation, pos GI bleeding   Genitourinary: Denies dysuria or frequency  Musculoskeletal/Rheum: Denies  joint pain and swelling, edema  Skin: Denies rash  Neurological: Denies headache, confusion, memory loss or focal weakness/parasthesias  Psychiatric: denies mood disorder   Endocrine: Yvette thyroid problems  Heme/Oncology/Lymph Nodes: Denies enlarged lymph nodes, denies brusing or known bleeding disorder  All other systems were reviewed and are negative (AMA/CMS criteria)              Past Medical History:   Past Medical History:   Diagnosis Date   • A-fib (HCC)    • Chronic obstructive pulmonary disease (HCC)    • Hypertension    • Psychiatric disorder     depression   • Thyroid disease      Active Hospital Problems    Diagnosis   • Acute upper GI bleeding [K92.2]     Priority: High   • Hypernatremia [E87.0]     Priority: Medium   • Hypokalemia [E87.6]     Priority: Medium   • Hemorrhagic disorder due to extrinsic circulating anticoagulants ([facility]) [D68.32]     Priority: Medium   • Acute blood loss anemia from GI bleed [D62]     Priority: Medium   • Atrial fibrillation with rapid ventricular response ([facility]) [I48.91]     Priority: Medium   • SHYAM (acute kidney injury) ([facility]) [N17.9]     Priority:  Medium   • Hyperkalemia [E87.5]     Priority: Medium   • Elevated troponin [R74.8]     Priority: Medium   • Obesity [E66.9]     Priority: Low   • HTN (hypertension) [I10]     Priority: Low   • Hypothyroidism [E03.9]     Priority: Low   • COPD (chronic obstructive pulmonary disease) (HCC) [J44.9]     Priority: Low   • HLD (hyperlipidemia) [E78.5]     Priority: Low       Past Surgical History:  Past Surgical History:   Procedure Laterality Date   • GASTROSCOPY-ENDO N/A 4/12/2019    Procedure: GASTROSCOPY;  Surgeon: Brad[place] Do M.D.;  Location: SURGERY [facility];  Service: Gastroenterology   • OTHER ORTHOPEDIC SURGERY      [facility] [place] Medications:  Current Facility-Administered Medications   Medication Dose Frequency Provider Last Rate Last Dose   • 1/2 NS infusion   Continuous Yanick Wade M.D. 60 mL/hr at 04/14/19 0850     • omeprazole (PRILOSEC) capsule 40 mg  40 mg DAILY Bradly Do M.D.   40 mg at 04/14/19 0508   • multivitamin (THERAGRAN) tablet 1 Tab  1 Tab DAILY Bradly Do M.D.   1 Tab at 04/14/19 0508   • DILTIAZem (CARDIZEM) tablet 60 mg  60 mg Q8HRS Anant Burk, A.P.N.   60 mg at 04/14/19 0508   • hydrALAZINE (APRESOLINE) injection 10 mg  10 mg Q6HRS PRN Yanick Wade M.D.       • levothyroxine (SYNTHROID) tablet 50 mcg  50 mcg AM ES Naty Arriola M.D.   50 mcg at 04/14/19 0508   • buPROPion SR (WELLBUTRIN-SR) tablet 200 mg  200 mg BID Naty Arriola M.D.   200 mg at 04/14/19 0508   • budesonide-formoterol (SYMBICORT) 80-4.5 MCG/ACT inhaler 1 Puff  1 Puff BID Naty Arriola M.D.   1 Puff at 04/14/19 0508   • atorvastatin (LIPITOR) tablet 20 mg  20 mg Nightly Naty Arriola M.D.   20 mg at 04/13/19 2026   • senna-docusate (PERICOLACE or SENOKOT S) 8.6-50 MG per tablet 2 Tab  2 Tab BID Naty Arriola M.D.   2 Tab at 04/14/19 0508    And   • polyethylene glycol/lytes (MIRALAX) PACKET 1 Packet  1 Packet QDAY PRN Naty Arriola M.D.        And   • magnesium hydroxide  (MILK OF MAGNESIA) suspension 30 mL  30 mL QDAY PRN Naty Arriola M.D.        And   • bisacodyl (DULCOLAX) suppository 10 mg  10 mg QDAY PRN Naty Arriola M.D.       • Respiratory Care per Protocol   Continuous RT Naty Arriola M.D.       • ondansetron (ZOFRAN) syringe/vial injection 4 mg  4 mg Q4HRS PRN Naty Arriola M.D.   4 mg at 04/12/19 0043   • ondansetron (ZOFRAN ODT) dispertab 4 mg  4 mg Q4HRS PRN Naty Arriola M.D.       • albuterol inhaler 2 Puff  2 Puff Q4HRS PRN Naty Arriola M.D.       • levalbuterol (XOPENEX) 0.63 MG/3ML nebulizer solution 0.63 mg  0.63 mg Q4H PRN (RT) Edgard Lin M.D.         Last reviewed on 4/11/2019  7:23 PM by Madai Garnett [facility]    Current Outpatient Medications:  Prescriptions Prior to Admission   Medication Sig Dispense Refill Last Dose   • diclofenac EC (VOLTAREN) 50 MG Tablet Delayed Response Take 50 mg by mouth 1 time daily as needed (Pain).   UNK at PRN   • hydroCHLOROthiazide (HYDRODIURIL) 25 MG Tab Take 25 mg by mouth every day.   4/11/2019 at AM   • levothyroxine (SYNTHROID) 50 MCG Tab Take 50 mcg by mouth Every morning on an empty stomach.   4/11/2019 at AM   • raNITidine (ZANTAC) 150 MG Tab Take 150 mg by mouth 2 times a day as needed for Heartburn.   UNK at PRN   • budesonide-formoterol (SYMBICORT) 80-4.5 MCG/ACT Aerosol Inhale 1 Puff by mouth 2 Times a Day.   4/11/2019 at AM   • buPROPion (WELLBUTRIN SR) 200 MG SR tablet Take 200 mg by mouth 2 times a day.   4/11/2019 at AM   • potassium chloride SA (KDUR) 20 MEQ Tab CR Take 40 mEq by mouth 2 times a day.   4/11/2019 at AM   • polyethylene glycol/lytes (MIRALAX) Pack Take 17 g by mouth 1 time daily as needed (Constipation).   UNK at PRN   • DILTIAZem (CARDIZEM) 60 MG Tab Take 60 mg by mouth every 8 hours.   4/11/2019 at AM   • aspirin EC (ECOTRIN) 81 MG Tablet Delayed Response Take 81 mg by mouth every day.   4/11/2019 at AM   • apixaban (ELIQUIS) 5mg Tab Take 5 mg by mouth 2 Times a Day.    4/11/2019 at AM   • magnesium oxide (MAG-OX) 400 MG Tab Take 400 mg by mouth every day.   4/11/2019 at AM   • atorvastatin (LIPITOR) 20 MG Tab Take 20 mg by mouth every evening.   4/10/2019 at PM   • omeprazole (PRILOSEC) 20 MG delayed-release capsule Take 20 mg by mouth every day.   4/11/2019 at AM   • furosemide (LASIX) 20 MG Tab Take 20 mg by mouth 1 time daily as needed (Fluid Retention).   4/11/2019 at AM   • lisinopril (PRINIVIL) 2.5 MG Tab Take 2.5 mg by mouth every day.   4/11/2019 at AM   • ipratropium-albuterol (DUONEB) 0.5-2.5 (3) MG/3ML nebulizer solution 3 mL by Nebulization route 4 times a day.   4/11/2019 at AM   • albuterol 108 (90 Base) MCG/ACT Aero Soln inhalation aerosol Inhale 2 Puffs by mouth every 6 hours as needed for Shortness of Breath.   UNK at PRN   • docusate sodium (COLACE) 100 MG Cap Take 100 mg by mouth 2 times a day as needed for Constipation.   <week at PRN       Medication Allergy:  No Known Allergies    Physical Exam:  Weight/BMI: Body mass index is 39.61 kg/m².  /59   Pulse 86   Temp 36.2 °C (97.1 °F) (Temporal)   Resp 18   Ht 1.524 m (5')   Wt 92 kg (202 lb 13.2 oz)   SpO2 97%   Vitals:    04/13/19 2052 04/14/19 0029 04/14/19 0411 04/14/19 0801   BP: 118/51 105/55 100/51 123/59   Pulse: (!) 110 (!) 122 (!) 121 86   Resp: 18 20 18 18   Temp: 36.6 °C (97.8 °F) 36.7 °C (98 °F) 36.7 °C (98 °F) 36.2 °C (97.1 °F)   TempSrc: Temporal Temporal Temporal Temporal   SpO2: 99% 98% 98% 97%   Weight: 92 kg (202 lb 13.2 oz)      Height:         Oxygen Therapy:  Pulse Oximetry: 97 %, O2 (LPM): 2, O2 Delivery: Silicone Nasal Cannula    Intake/Output Summary (Last 24 hours) at 04/14/19 1032  Last data filed at 04/14/19 0411   Gross per 24 hour   Intake              370 ml   Output             1400 ml   Net            -1030 ml       Constitutional:   Well developed, well nourished, no acute distress  HEENT:  Normocephalic, Atraumatic, Conjunctiva mild pale, Sclera not icteric,  Oropharynx moist mucous membranes, No oral exudates, Nose normal.  No thyromegaly.  Neck:  Normal range of motion, No cervical tenderness,  no JVD.  Chest/Lungs:  Symmetric expansion, no spider angioma, breath sounds clear to auscultation bilaterally,  no crackles, no wheezing.   Cardiovascular:  Normal heart rate, Normal rhythm, No murmurs, No rubs, No gallops.    Abdomen: Bowel sounds normal, Soft, No tenderness, No guarding, No rebound, No masses, No hepatosplenomegaly.  Extremities: No cyanosis/clubbing/edema/palmar erythema/flapping tremor  Skin: Warm, Dry, No erythema, No rash, no induration.    MDM (Data Review):     Records reviewed and summarized in current documentation    Lab Data Review:  Recent Results (from the past 24 hour(s))   HGB (Hemoglobin) for 48 hours    Collection Time: 04/13/19 12:18 PM   Result Value Ref Range    Hemoglobin 7.8 (L) 12.0 - 16.0 g/dL   IRON/TOTAL IRON BIND    Collection Time: 04/13/19 12:18 PM   Result Value Ref Range    Iron 92 40 - 170 ug/dL    Total Iron Binding 358 250 - 450 ug/dL    % Saturation 26 15 - 55 %   FERRITIN    Collection Time: 04/13/19 12:18 PM   Result Value Ref Range    Ferritin 34.3 10.0 - 291.0 ng/mL   VITAMIN B12    Collection Time: 04/13/19 12:18 PM   Result Value Ref Range    Vitamin B12 -True Cobalamin 898 211 - 911 pg/mL   FOLATE    Collection Time: 04/13/19 12:18 PM   Result Value Ref Range    Folate -Folic Acid 9.9 >4.0 ng/mL   HEMOGLOBIN AND HEMATOCRIT    Collection Time: 04/13/19  4:05 PM   Result Value Ref Range    Hemoglobin 8.4 (L) 12.0 - 16.0 g/dL    Hematocrit 27.6 (L) 37.0 - 47.0 %   OCCULT BLOOD STOOL    Collection Time: 04/13/19  6:15 PM   Result Value Ref Range    Occult Blood Feces Positive (A) Negative   CBC WITH DIFFERENTIAL    Collection Time: 04/14/19  3:06 AM   Result Value Ref Range    WBC 7.9 4.8 - 10.8 K/uL    RBC 2.52 (L) 4.20 - 5.40 M/uL    Hemoglobin 7.7 (L) 12.0 - 16.0 g/dL    Hematocrit 24.9 (L) 37.0 - 47.0 %    MCV 98.8 (H)  81.4 - 97.8 fL    MCH 30.6 27.0 - 33.0 pg    MCHC 30.9 (L) 33.6 - 35.0 g/dL    RDW 70.4 (H) 35.9 - 50.0 fL    Platelet Count 277 164 - 446 K/uL    MPV 9.8 9.0 - 12.9 fL    Neutrophils-Polys 79.00 (H) 44.00 - 72.00 %    Lymphocytes 8.00 (L) 22.00 - 41.00 %    Monocytes 9.40 0.00 - 13.40 %    Eosinophils 1.00 0.00 - 6.90 %    Basophils 0.10 0.00 - 1.80 %    Immature Granulocytes 2.50 (H) 0.00 - 0.90 %    Nucleated RBC 0.50 /100 WBC    Neutrophils (Absolute) 6.19 2.00 - 7.15 K/uL    Lymphs (Absolute) 0.63 (L) 1.00 - 4.80 K/uL    Monos (Absolute) 0.74 0.00 - 0.85 K/uL    Eos (Absolute) 0.08 0.00 - 0.51 K/uL    Baso (Absolute) 0.01 0.00 - 0.12 K/uL    Immature Granulocytes (abs) 0.20 (H) 0.00 - 0.11 K/uL    NRBC (Absolute) 0.04 K/uL   Basic Metabolic Panel    Collection Time: 04/14/19  3:06 AM   Result Value Ref Range    Sodium 147 (H) 135 - 145 mmol/L    Potassium 3.3 (L) 3.6 - 5.5 mmol/L    Chloride 112 96 - 112 mmol/L    Co2 27 20 - 33 mmol/L    Glucose 87 65 - 99 mg/dL    Bun 39 (H) 8 - 22 mg/dL    Creatinine 1.22 0.50 - 1.40 mg/dL    Calcium 7.7 (L) 8.5 - 10.5 mg/dL    Anion Gap 8.0 0.0 - 11.9   ESTIMATED GFR    Collection Time: 04/14/19  3:06 AM   Result Value Ref Range    GFR If  51 (A) >60 mL/min/1.73 m 2    GFR If Non  42 (A) >60 mL/min/1.73 m 2   MAGNESIUM    Collection Time: 04/14/19  3:06 AM   Result Value Ref Range    Magnesium 2.1 1.5 - 2.5 mg/dL       MDM (Assessment and Plan):     Active Hospital Problems    Diagnosis   • Acute upper GI bleeding [K92.2]     Priority: High   • Hypernatremia [E87.0]     Priority: Medium   • Hypokalemia [E87.6]     Priority: Medium   • Hemorrhagic disorder due to extrinsic circulating anticoagulants (HCC) [D68.32]     Priority: Medium   • Acute blood loss anemia from GI bleed [D62]     Priority: Medium   • Atrial fibrillation with rapid ventricular response (HCC) [I48.91]     Priority: Medium   • SHYAM (acute kidney injury) ([facility]) [N17.9]      Priority: Medium   • Hyperkalemia [E87.5]     Priority: Medium   • Elevated troponin [R74.8]     Priority: Medium   • Obesity [E66.9]     Priority: Low   • HTN (hypertension) [I10]     Priority: Low   • Hypothyroidism [E03.9]     Priority: Low   • COPD (chronic obstructive pulmonary disease) (HCC) [J44.9]     Priority: Low   • HLD (hyperlipidemia) [E78.5]     Priority: Low       Imaging/Procedures Review:    4/11/19 CT A/P:  1.  Ventral abdominal wall hernia containing loop of sigmoid colon with no evidence of obstruction.  2.  Right adrenal mass demonstrates characteristics consistent with adrenal adenoma.  3.  There is diverticulosis.  4.  Renal cysts are identified.  5.  Calcific atherosclerosis.    4/12/19 LVEF: 25%    4/13/19 NM RBC bleeding scan: No evidence of gastrointestinal bleeding.    Assessment  # Anemia, questionable obscure GI blood loss, neg bleeding scan  # Gastritis  # Hiatal hernia  # Elevated troponin 1.06  # Hyperkalemia  # Elevated Creatinine, elevated BUN; unclear baseline  # Ventral hernia with sigmoid colon  # Diverticulosis  # Adrenal Adenoma  # Tachycadia  # Atrial fibrillation on anticoagulation  # Congestive Heart Failure  # Shortness of breath  # Hypothyroidism    Plan  - Serial H/H monitoring, keeping HgB > 7 but if in setting of ischemia would recommend keeping it > 8. Transfusion as per team  - Omeprazole 40 mg daily AC  - The patient dislikes colon prep and would like to avoid colonoscopy if possible, also considering her LVEF only 25%  - Will sign off and stand by. Please contact us again if we can be of further assistance.   - F/u with Dr. Abraham Jurado at [facility] in 2 weeks    Thank you very much for allowing me to participate in the care of your patient.  Please feel free to contact me anytime at [phone].     Bradly Do M.D.    Core Quality Measures   Reviewed items::  Labs, Medications and Radiology reports reviewed

## 2019-04-14 NOTE — PROGRESS NOTES
Hospital Medicine Daily Progress Note    Date of Service  4/14/2019    Chief Complaint  Weakness, poor appetite, nausea, melena    Hospital Course    78 y.o. female with COPD, hypertension, hypothyroidism, atrial fibrillation on anticoagulation with Eliquis, admitted 4/11/2019 with weakness and poor appetite, nausea with retching but no vomiting, and report of persistent black stools and melena.  Initial outside hospital workup showed mildly elevated troponin of 0.7, with hemoglobin of 7.9, potassium of 6.5, and creatinine of 2.25.  Chest x-ray showed blunting of the right costophrenic angle with no consolidations, and moderate cardiomegaly.  Repeat labs here showed WBC of 12,100, and hemoglobin of 8.9, with potassium 6.2, and creatinine of 2.01.  CT of the abdomen showed ventral abdominal wall hernia containing loop of sigmoid colon with no evidence of obstruction.  She was felt to have GI bleed.  Her anticoagulation was held, and she started on PPI drip. GI was consulted.  She is also noted to be tachycardic, felt to be related to her worsening anemia.  She is started on volume expansion with IV fluids, and was given one-time dose of Cardizem.  She was given calcium gluconate, Lasix, D50 for her hyperkalemia.  Potassium improved.  Troponin peaked at 1.06.  Cardiology was consulted, who felt that the troponin elevation was demand ischemia.  Echocardiogram showed EF of 25%, with moderate MR, with no prior study available for comparison. She was cleared for endoscopy.  She underwent EGD, which showed normal esophagus, with 4 cm hiatal hernia, with gastritis in the antrum which was biopsied, with grossly normal duodenum.  She was transfused 1 unit of packed RBC for low Hgb with symptoms of tachycardia.  She was transitioned to oral PPI. Her creatinine improved. Digoxin level is normal.  PT/OT was ordered.             Interval Problem Update  4/14/2019 - I reviewed the patient's chart today. Uneventful night. VSS.  Afebrile.  Heart rate remains tachycardic. Saturating well on 2L O2 NC.  Creatinine improved to 1.22.  Sodium 147.  Potassium 3.3.  Hemoglobin dropped slightly to 7.7.  Creatinine significantly improved to 1.22.  Tagged RBC scan showed no evidence of GI bleeding.  MoviPrep was positive.    > I have personally seen and examined the patient today.  She feels that she is doing better, feels generally better.  She is not short of breath.  She does not feel puffy.  No chest pain.  No abdominal pain.  No nausea vomiting.  Tolerating oral diet.        Consultants/Specialty  GI  Cardiology    Code Status  Full    Disposition  Monitor on telemetry.  Low 6 clicks scores.  Lives alone in [place]. Awaiting PT/OT evaluation.    Review of Systems  ROS     Pertinent positives/negatives as mentioned above.     A complete review of systems was personally done by me. All other systems were negative.       Physical Exam  Temp:  [36.1 °C (97 °F)-36.7 °C (98 °F)] 36.2 °C (97.1 °F)  Pulse:  [] 86  Resp:  [18-20] 18  BP: ()/(51-61) 123/59  SpO2:  [97 %-99 %] 97 %    Physical Exam   Constitutional: She is oriented to person, place, and time. She appears well-developed. No distress.   Obese. Body mass index is 38.88 kg/m².   HENT:   Head: Normocephalic and atraumatic.   Mouth/Throat: No oropharyngeal exudate.   Eyes: Pupils are equal, round, and reactive to light. Conjunctivae are normal. No scleral icterus.   Neck: Normal range of motion. Neck supple.   Cardiovascular: Exam reveals no gallop and no friction rub.    Still tachycardic.  Irregular rhythm.  No murmurs.   Pulmonary/Chest: Effort normal. No respiratory distress. She has no wheezes. She has no rales. She exhibits no tenderness.   Diminished air entry B/L bases, otherwise clear to auscultation   Abdominal: Soft. She exhibits no distension. There is no tenderness. There is no rebound and no guarding.   Musculoskeletal: She exhibits no edema or tenderness.    Lymphadenopathy:     She has no cervical adenopathy.   Neurological: She is alert and oriented to person, place, and time. No cranial nerve deficit.   Skin: Skin is warm and dry. No rash noted. She is not diaphoretic. No erythema. No pallor.   Psychiatric: She has a normal mood and affect. Her behavior is normal. Judgment and thought content normal.   Nursing note and vitals reviewed.      Fluids    Intake/Output Summary (Last 24 hours) at 04/14/19 1017  Last data filed at 04/14/19 0411   Gross per 24 hour   Intake              370 ml   Output             1400 ml   Net            -1030 ml       Laboratory  Recent Labs      04/11/19   1815   04/13/19   0318  04/13/19   1218  04/13/19   1605  04/14/19   0306   WBC  12.1*   --   9.2   --    --   7.9   RBC  2.93*   --   2.25*   --    --   2.52*   HEMOGLOBIN  8.9*   < >  7.0*  7.8*  8.4*  7.7*   HEMATOCRIT  29.1*   --   23.1*   --   27.6*  24.9*   MCV  99.3*   --   102.7*   --    --   98.8*   MCH  30.4   --   31.1   --    --   30.6   MCHC  30.6*   --   30.3*   --    --   30.9*   RDW  63.6*   --   66.9*   --    --   70.4*   PLATELETCT  301   --   267   --    --   277   MPV  10.7   --   10.1   --    --   9.8    < > = values in this interval not displayed.     Recent Labs      04/11/19   2356   04/12/19   1148  04/13/19   0318  04/14/19   0306   SODIUM  135   --    --   142  147*   POTASSIUM  5.5   < >  5.0  4.1  3.3*   CHLORIDE  105   --    --   110  112   CO2  20   --    --   23  27   GLUCOSE  106*   --    --   76  87   BUN  93*   --    --   69*  39*   CREATININE  2.05*   --    --   1.71*  1.22   CALCIUM  8.7   --    --   7.8*  7.7*    < > = values in this interval not displayed.     Recent Labs      04/11/19 1815   INR  2.23*               Imaging  NM-GI BLEEDING SCAN   Final Result      No evidence of gastrointestinal bleeding.      IR-MIDLINE CATHETER INSERTION WO GUIDANCE > AGE 3   Final Result                  Ultrasound-guided midline placement performed by  qualified nursing staff    as above.          DX-CHEST-PORTABLE (1 VIEW)   Final Result         Diffuse interstitial prominence could relate to mild pulmonary edema.      Trace pleural effusions.      Cardiomegaly.      EC-ECHOCARDIOGRAM COMPLETE W/O CONT   Final Result      CT-ABDOMEN-PELVIS W/O   Final Result      1.  Ventral abdominal wall hernia containing loop of sigmoid colon with no evidence of obstruction.      2.  Right adrenal mass demonstrates characteristics consistent with adrenal adenoma.      3.  There is diverticulosis.      4.  Renal cysts are identified.      5.  Calcific atherosclerosis.      EC-ECHOCARDIOGRAM LTD W/O CONT    (Results Pending)        Assessment/Plan  * Acute upper GI bleeding- (present on admission)   Assessment & Plan    -EGD showed gastritis. Tagged RBC bleeding scan was negative.  -Follow biopsy results.  -Continue oral Prilosec.  -Continue to monitor H&H closely, transfuse as necessary with restrictive transfusion strategy.  -needs outpatient colonoscopy, follow-up with GI as outpatient.   -discussed with GI, will need to hold her anticoagulation until cleared by GI as outpatient.   -Close hemodynamic monitoring on telemetry.     Hypokalemia   Assessment & Plan    -Will give her 40 mg of oral K-Dur.  Check magnesium level.  BMP in the morning.     Hypernatremia   Assessment & Plan    -Change IV fluid to half-normal saline.  BMP in the morning.     Acute blood loss anemia from GI bleed- (present on admission)   Assessment & Plan    -Hgb stable post transfusion. Continue to monitor hemoglobin closely, transfuse if drops below 7, or below 8 if symptomatic.     Hemorrhagic disorder due to extrinsic circulating anticoagulants (HCC)- (present on admission)   Assessment & Plan    -Continue to hold anticoagulants.  Management of GI bleed as above.     Elevated troponin- (present on admission)   Assessment & Plan    -Suspect this is type II NSTEMI due to demand ischemia from GI bleed,  anemia, and tachycardia with RVR.  Troponin has trended down.  -Continue to holding antiplatelets and anticoagulants due to GI bleed.  -Cardiology on board, with further inputs. ?  Ischemic workup prior to discharge.  -Monitor on telemetry.     Hyperkalemia- (present on admission)   Assessment & Plan    -Patient received hyperkalemia cocktail, with improvement in potassium level.  This is likely related from volume depletion, and acute kidney injury in setting of being on lisinopril.  Potassium has normalized, now low.   -Continue hydration.  -Continue to monitor renal function, and potassium levels.  BMP in the morning.  -Monitor on telemetry.     SHYAM (acute kidney injury) ([facility])- (present on admission)   Assessment & Plan    -Improving creatinine.  Continue gentle IV fluids. Suspect this is prerenal.  -Continue hydration with IVF, change to 1/2 NS at 60cc/hr.  Watch for signs of volume overload given her low EF. continue to hold lisinopril.    - avoid nephrotoxins, and continue to renally dose all medications.     Atrial fibrillation with rapid ventricular response (HCC)- (present on admission)   Assessment & Plan    -Suspect this is due to anemia, and volume depletion.   -Continue digoxin.  Continue Cardizem, although she will not be able to tolerate higher dose due to borderline blood pressure.  ?additional antiarrhythmics. Await further cardiology inputs.    -Continue to hold anticoagulation due to GI bleed.  -Continue hydration with IV fluids.  Monitor hemoglobin, and transfuse as necessary.  -Continue to monitor on telemetry.       Obesity- (present on admission)   Assessment & Plan    - Body mass index is 38.88 kg/m²..  - outpatient referral for outpatient weight management.     HLD (hyperlipidemia)- (present on admission)   Assessment & Plan    -Continue home dose Lipitor.     COPD (chronic obstructive pulmonary disease) (HCC)- (present on admission)   Assessment & Plan    -Not in acute exacerbation.   Continue home Symbicort, along with bronchodilators per RT protocol.     Hypothyroidism- (present on admission)   Assessment & Plan    -TSH is normal. Continue home dose Synthroid.     HTN (hypertension)- (present on admission)   Assessment & Plan    -Blood pressure borderline, likely due to volume depletion.  -Continue Cardizem with holding parameter. Continue to hold lisinopril and hydrochlorothiazide due to initial hyperkalemia and acute kidney injury.   -Continue to monitor blood pressure trend closely, with PRN IV hydralazine for significant hypertension.          VTE prophylaxis: SCD.  Anticoagulation on hold due to GI bleed

## 2019-04-14 NOTE — PROGRESS NOTES
· 2 RN skin check complete with TAY Nguyen.  · Devices in place SCDs, heel float boots, nasal cannula.  · Skin assessed under devices pink and blanching.  · Confirmed pressure ulcers found on N/A.  · New potential pressure ulcers noted on N/A.   · The following interventions in place soft, silicone tubing, waffle mattress, heel float boots, SCDs with tubing secured away from skin.    Patient's bilateral ears are pink and blanching.  Patient's bilateral arms have scattered bruising and scabs.  Patient's bilateral elbows are pink and blanching.  Patient's panus is pink, blanching, and moist, with fresh interdry in place.  Patient's bilateral legs have scattered bruising and scabs.  Patient's inner thighs and groin area are pink and blanching where hopkins catheter is placed.  Patient's bilateral feet are dry, calloused, and flaky.  Patient's bilateral heels are pink, boggy, and slow to josephine.  Patient has bilateral bunions on medial metatarsals.  Patient has corn on upper side of second toe of left foot.

## 2019-04-14 NOTE — PROGRESS NOTES
Cardiology Follow Up Progress Note    Date of Service  4/14/2019    Attending Physician  Yanick Wade M.D.    Reason for consultation     Atrial fibrillation RVR, troponin elevation 0.97    History of   Patient lives in [place], morbid obesity, poor historian but  likelihood of recent hospitalization with heart failure and atrial fibrillation, reports black stool for some time but thought secondary to iron      Admitted for     TX from outside facility for GI bleed, patient reports tarry stool for quite some time (believed secondary to iron use), hemoglobin 7.9, hematocrit 25.7 GI was consulted    Interim Events    4/14/19, remains in A. fib, rate controlled, asymptomatic, hypotension improved, encouraged out of bed    4/13/19 remains in A. fib, rate controlled, asymptomatic, no dyspnea, no angina, hypotensive, blood transfusion in progress    Review of Systems  Review of Systems   Respiratory: Negative for apnea, cough, choking, chest tightness, shortness of breath, wheezing and stridor.    Cardiovascular: Negative for chest pain and leg swelling.       Vital signs in last 24 hours  Temp:  [35.9 °C (96.7 °F)-36.7 °C (98 °F)] 35.9 °C (96.7 °F)  Pulse:  [] 107  Resp:  [17-20] 17  BP: ()/(51-61) 94/60  SpO2:  [97 %-99 %] 98 %    Physical Exam  Physical Exam   Constitutional: She is oriented to person, place, and time.   BMI 40   HENT:   Head: Normocephalic.   Eyes: Conjunctivae are normal.   Neck: No JVD present. No thyromegaly present.   Cardiovascular: Normal rate.  An irregularly irregular rhythm present.   Pulses:       Carotid pulses are 2+ on the right side, and 2+ on the left side.       Radial pulses are 2+ on the right side, and 2+ on the left side.   Pulmonary/Chest: She has no wheezes.   Abdominal: Soft.   Musculoskeletal: She exhibits no edema.   Neurological: She is alert and oriented to person, place, and time.   Skin: Skin is warm and dry.       Lab Review  Lab Results   Component Value  Date/Time    WBC 7.9 04/14/2019 03:06 AM    RBC 2.52 (L) 04/14/2019 03:06 AM    HEMOGLOBIN 7.7 (L) 04/14/2019 03:06 AM    HEMATOCRIT 24.9 (L) 04/14/2019 03:06 AM    MCV 98.8 (H) 04/14/2019 03:06 AM    MCH 30.6 04/14/2019 03:06 AM    MCHC 30.9 (L) 04/14/2019 03:06 AM    MPV 9.8 04/14/2019 03:06 AM      Lab Results   Component Value Date/Time    SODIUM 147 (H) 04/14/2019 03:06 AM    POTASSIUM 3.3 (L) 04/14/2019 03:06 AM    CHLORIDE 112 04/14/2019 03:06 AM    CO2 27 04/14/2019 03:06 AM    GLUCOSE 87 04/14/2019 03:06 AM    BUN 39 (H) 04/14/2019 03:06 AM    CREATININE 1.22 04/14/2019 03:06 AM      Lab Results   Component Value Date/Time    ASTSGOT 19 04/11/2019 06:15 PM    ALTSGPT 16 04/11/2019 06:15 PM     Lab Results   Component Value Date/Time    TROPONINI 0.97 (H) 04/12/2019 04:04 AM               Assessment      GI bleed with hemoglobin 7.9 and hematocrit of 23, GI was consulted    S/p EGD 4/12/19 : Esophagus: grossly  Normal  Stomach: 4 cm hiatal hernia, 36-40 cm; gastritis in antrum s/p cold forceps biopsy   Duodenum: grossly normal, s/p cold forceps biopsy   No fresh blood nor blood clot was seen in the entire exam.    Declined colonoscopy, GI signed off    A. fib RVR, recent diagnosis of AFIB on Xarelto . Now on hold 2/2 GIB    Elevated troponin (peaked at 1), suspect demand ischemia in the setting of GI bleed    Severe cardiomyopathy, LVEF 20% based on echo 4/12/19, RVSP 40 millimeters Hg    Hyperkalemia, now hypokalemic, potassium 3.3    Acute kidney injury, Cr 1.22 improved     COPD    Hypertension, hypotensive overnight , H/H 7.7/24, required transfusion of 4/13/19 x one unit    Hyperlipidemia, on lipitor     Morbid obesity    Low potassium, 3.3, was repeated      Plan  We will consider switching diltiazem (negative inotrope) to low-dose metoprolol for rate control  Will switch to succinate prior to discharge  Holding Xarelto secondary to GI bleed  GDMT for severe cardiomyopathy when BP permits  Limited echo  today to evaluate LV function  Limit IVF  PT OT  Out of bed  Will follow

## 2019-04-14 NOTE — THERAPY
"Physical Therapy Evaluation completed.   Bed Mobility:  Supine to Sit: Minimal Assist (for safety, used bed features)  Transfers: Sit to Stand: Minimal Assist  Gait: Level Of Assist: Minimal Assist with Front-Wheel Walker       Plan of Care: Will benefit from Physical Therapy 3 times per week  Discharge Recommendations: Equipment: patient has FWW, 4WW. Post-acute therapy: see below.    See \"Rehab Therapy-Acute\" Patient Summary Report for complete documentation.    Patient is a 79 YO female that was admitted 4/11 from outside facility for GI bleed. Patient with PMHx significant for COPD, HTN, hypothyroidism, Afib on anticoagulant. Patient presented to PT with pain, abnormal hemodynamic response to activity, and decreased activity tolerance. She performed bed mobility with min A for BLE into bed and with bed features. She ambulated approximately 75ft with FWW and min A, required standing rest breaks every 25ft. Patient was tachycardic with HR into 130-140s with activity so assisted back to bed following ambulation. Recommend post acute placement prior to return home. Will continue to follow during acute stay.  "

## 2019-04-14 NOTE — PROGRESS NOTES
· 2 RN skin check complete with TAY Paula.  · Devices in place nasal cannula, SCDs, heel boots, hopkins catheter. Skin assessed under devices yes.  · Confirmed pressure ulcers found on none.  · New potential pressure ulcers noted on none. Wound consult placed and wound reported.  · The following interventions in place Turns, waffle mattress, float boots, pillows for elbows, silicone tubing.     Bilateral ears pink, blanching.   Scattered scabbing and bruising bilateral upper extremities.   Scatter scabbing and bruising bilateral lower extremities.   Skin fold on abdomen pink, blanching Interdry in place. Inner thighs moist, pink. Interdrys in place.   Heels pink, slow to josephine, dry, boggy.

## 2019-04-15 LAB
ANION GAP SERPL CALC-SCNC: 7 MMOL/L (ref 0–11.9)
BUN SERPL-MCNC: 25 MG/DL (ref 8–22)
CALCIUM SERPL-MCNC: 7.5 MG/DL (ref 8.5–10.5)
CHLORIDE SERPL-SCNC: 111 MMOL/L (ref 96–112)
CO2 SERPL-SCNC: 26 MMOL/L (ref 20–33)
CREAT SERPL-MCNC: 1.12 MG/DL (ref 0.5–1.4)
GLUCOSE SERPL-MCNC: 97 MG/DL (ref 65–99)
HCT VFR BLD AUTO: 25 % (ref 37–47)
HGB BLD-MCNC: 7.6 G/DL (ref 12–16)
POTASSIUM SERPL-SCNC: 3.6 MMOL/L (ref 3.6–5.5)
SODIUM SERPL-SCNC: 144 MMOL/L (ref 135–145)

## 2019-04-15 PROCEDURE — 700102 HCHG RX REV CODE 250 W/ 637 OVERRIDE(OP): Performed by: HOSPITALIST

## 2019-04-15 PROCEDURE — A9270 NON-COVERED ITEM OR SERVICE: HCPCS | Performed by: HOSPITALIST

## 2019-04-15 PROCEDURE — A9270 NON-COVERED ITEM OR SERVICE: HCPCS | Performed by: PHYSICIAN ASSISTANT

## 2019-04-15 PROCEDURE — 700105 HCHG RX REV CODE 258: Performed by: INTERNAL MEDICINE

## 2019-04-15 PROCEDURE — 700111 HCHG RX REV CODE 636 W/ 250 OVERRIDE (IP): Performed by: PHYSICIAN ASSISTANT

## 2019-04-15 PROCEDURE — 85014 HEMATOCRIT: CPT

## 2019-04-15 PROCEDURE — A9270 NON-COVERED ITEM OR SERVICE: HCPCS | Performed by: NURSE PRACTITIONER

## 2019-04-15 PROCEDURE — 770020 HCHG ROOM/CARE - TELE (206)

## 2019-04-15 PROCEDURE — 51798 US URINE CAPACITY MEASURE: CPT

## 2019-04-15 PROCEDURE — 700102 HCHG RX REV CODE 250 W/ 637 OVERRIDE(OP): Performed by: NURSE PRACTITIONER

## 2019-04-15 PROCEDURE — A9270 NON-COVERED ITEM OR SERVICE: HCPCS | Performed by: INTERNAL MEDICINE

## 2019-04-15 PROCEDURE — 700102 HCHG RX REV CODE 250 W/ 637 OVERRIDE(OP): Performed by: PHYSICIAN ASSISTANT

## 2019-04-15 PROCEDURE — 700102 HCHG RX REV CODE 250 W/ 637 OVERRIDE(OP): Performed by: INTERNAL MEDICINE

## 2019-04-15 PROCEDURE — 80048 BASIC METABOLIC PNL TOTAL CA: CPT

## 2019-04-15 PROCEDURE — 99233 SBSQ HOSP IP/OBS HIGH 50: CPT | Performed by: INTERNAL MEDICINE

## 2019-04-15 PROCEDURE — 85018 HEMOGLOBIN: CPT

## 2019-04-15 RX ORDER — POTASSIUM CHLORIDE 20 MEQ/1
40 TABLET, EXTENDED RELEASE ORAL DAILY
Status: DISCONTINUED | OUTPATIENT
Start: 2019-04-15 | End: 2019-04-19 | Stop reason: HOSPADM

## 2019-04-15 RX ORDER — DIGOXIN 0.25 MG/ML
250 INJECTION INTRAMUSCULAR; INTRAVENOUS ONCE
Status: COMPLETED | OUTPATIENT
Start: 2019-04-15 | End: 2019-04-15

## 2019-04-15 RX ORDER — LISINOPRIL 5 MG/1
5 TABLET ORAL EVERY EVENING
Status: DISCONTINUED | OUTPATIENT
Start: 2019-04-15 | End: 2019-04-15

## 2019-04-15 RX ORDER — METOPROLOL SUCCINATE 50 MG/1
50 TABLET, EXTENDED RELEASE ORAL
Status: DISCONTINUED | OUTPATIENT
Start: 2019-04-16 | End: 2019-04-16

## 2019-04-15 RX ORDER — DIGOXIN 125 MCG
125 TABLET ORAL DAILY
Status: DISCONTINUED | OUTPATIENT
Start: 2019-04-16 | End: 2019-04-19 | Stop reason: HOSPADM

## 2019-04-15 RX ORDER — AMOXICILLIN 500 MG/1
1000 CAPSULE ORAL EVERY 12 HOURS
Status: DISCONTINUED | OUTPATIENT
Start: 2019-04-15 | End: 2019-04-19 | Stop reason: HOSPADM

## 2019-04-15 RX ORDER — DIGOXIN 125 MCG
125 TABLET ORAL DAILY
Status: DISCONTINUED | OUTPATIENT
Start: 2019-04-15 | End: 2019-04-15

## 2019-04-15 RX ORDER — FUROSEMIDE 10 MG/ML
20 INJECTION INTRAMUSCULAR; INTRAVENOUS
Status: DISCONTINUED | OUTPATIENT
Start: 2019-04-15 | End: 2019-04-16

## 2019-04-15 RX ORDER — CLARITHROMYCIN 500 MG/1
500 TABLET, COATED ORAL EVERY 12 HOURS
Status: DISCONTINUED | OUTPATIENT
Start: 2019-04-15 | End: 2019-04-19 | Stop reason: HOSPADM

## 2019-04-15 RX ORDER — LISINOPRIL 5 MG/1
2.5 TABLET ORAL EVERY EVENING
Status: DISCONTINUED | OUTPATIENT
Start: 2019-04-15 | End: 2019-04-17

## 2019-04-15 RX ORDER — FUROSEMIDE 20 MG/1
20 TABLET ORAL
Status: DISCONTINUED | OUTPATIENT
Start: 2019-04-15 | End: 2019-04-15

## 2019-04-15 RX ADMIN — ATORVASTATIN CALCIUM 20 MG: 20 TABLET, FILM COATED ORAL at 21:31

## 2019-04-15 RX ADMIN — POTASSIUM CHLORIDE 40 MEQ: 1500 TABLET, EXTENDED RELEASE ORAL at 13:32

## 2019-04-15 RX ADMIN — METOPROLOL TARTRATE 25 MG: 25 TABLET ORAL at 07:20

## 2019-04-15 RX ADMIN — OMEPRAZOLE 40 MG: 20 CAPSULE, DELAYED RELEASE ORAL at 07:20

## 2019-04-15 RX ADMIN — LISINOPRIL 2.5 MG: 5 TABLET ORAL at 17:18

## 2019-04-15 RX ADMIN — BUDESONIDE AND FORMOTEROL FUMARATE DIHYDRATE 1 PUFF: 80; 4.5 AEROSOL RESPIRATORY (INHALATION) at 17:19

## 2019-04-15 RX ADMIN — AMOXICILLIN 1000 MG: 500 CAPSULE ORAL at 13:31

## 2019-04-15 RX ADMIN — BUPROPION HYDROCHLORIDE 200 MG: 100 TABLET, FILM COATED, EXTENDED RELEASE ORAL at 07:20

## 2019-04-15 RX ADMIN — AMOXICILLIN 1000 MG: 500 CAPSULE ORAL at 17:19

## 2019-04-15 RX ADMIN — BUDESONIDE AND FORMOTEROL FUMARATE DIHYDRATE 1 PUFF: 80; 4.5 AEROSOL RESPIRATORY (INHALATION) at 07:21

## 2019-04-15 RX ADMIN — SODIUM CHLORIDE: 4.5 INJECTION, SOLUTION INTRAVENOUS at 03:51

## 2019-04-15 RX ADMIN — BUPROPION HYDROCHLORIDE 200 MG: 100 TABLET, FILM COATED, EXTENDED RELEASE ORAL at 17:17

## 2019-04-15 RX ADMIN — CLARITHROMYCIN 500 MG: 500 TABLET ORAL at 17:19

## 2019-04-15 RX ADMIN — CLARITHROMYCIN 500 MG: 500 TABLET ORAL at 13:32

## 2019-04-15 RX ADMIN — LEVOTHYROXINE SODIUM 50 MCG: 50 TABLET ORAL at 07:20

## 2019-04-15 RX ADMIN — FUROSEMIDE 20 MG: 20 TABLET ORAL at 15:38

## 2019-04-15 RX ADMIN — THERA TABS 1 TABLET: TAB at 07:20

## 2019-04-15 RX ADMIN — DIGOXIN 250 MCG: 0.25 INJECTION INTRAMUSCULAR; INTRAVENOUS at 17:22

## 2019-04-15 RX ADMIN — METOPROLOL TARTRATE 25 MG: 25 TABLET ORAL at 15:38

## 2019-04-15 RX ADMIN — FUROSEMIDE 20 MG: 10 INJECTION, SOLUTION INTRAMUSCULAR; INTRAVENOUS at 18:31

## 2019-04-15 RX ADMIN — METOPROLOL TARTRATE 25 MG: 25 TABLET ORAL at 21:31

## 2019-04-15 ASSESSMENT — ENCOUNTER SYMPTOMS
SHORTNESS OF BREATH: 1
FEVER: 0
CHEST TIGHTNESS: 0
PALPITATIONS: 0
LIGHT-HEADEDNESS: 0
DIZZINESS: 0
DIAPHORESIS: 0
CHILLS: 0

## 2019-04-15 ASSESSMENT — PATIENT HEALTH QUESTIONNAIRE - PHQ9
2. FEELING DOWN, DEPRESSED, IRRITABLE, OR HOPELESS: NOT AT ALL
1. LITTLE INTEREST OR PLEASURE IN DOING THINGS: NOT AT ALL
SUM OF ALL RESPONSES TO PHQ9 QUESTIONS 1 AND 2: 0
2. FEELING DOWN, DEPRESSED, IRRITABLE, OR HOPELESS: NOT AT ALL
SUM OF ALL RESPONSES TO PHQ9 QUESTIONS 1 AND 2: 0
1. LITTLE INTEREST OR PLEASURE IN DOING THINGS: NOT AT ALL

## 2019-04-15 NOTE — DISCHARGE PLANNING
Agency/Facility Name: [facility]  Spoke To: Yolie  Outcome: Referral not received, per request refaxed to [phone]

## 2019-04-15 NOTE — DISCHARGE PLANNING
Received Choice form at 1100  Agency/Facility Name: [facility]  Referral sent per Choice form at 1110

## 2019-04-15 NOTE — PROGRESS NOTES
Hospital Medicine Daily Progress Note    Date of Service  4/15/2019    Chief Complaint  Weakness, poor appetite, nausea, melena    Hospital Course    78 y.o. female with COPD, hypertension, hypothyroidism, atrial fibrillation on anticoagulation with Eliquis, admitted 4/11/2019 with weakness and poor appetite, nausea with retching but no vomiting, and report of persistent black stools and melena.  Initial outside hospital workup showed mildly elevated troponin of 0.7, with hemoglobin of 7.9, potassium of 6.5, and creatinine of 2.25.  Chest x-ray showed blunting of the right costophrenic angle with no consolidations, and moderate cardiomegaly.  Repeat labs here showed WBC of 12,100, and hemoglobin of 8.9, with potassium 6.2, and creatinine of 2.01.  CT of the abdomen showed ventral abdominal wall hernia containing loop of sigmoid colon with no evidence of obstruction.  She was felt to have GI bleed.  Her anticoagulation was held, and she started on PPI drip. GI was consulted.  She is also noted to be tachycardic, felt to be related to her worsening anemia.  She is started on volume expansion with IV fluids, and was given one-time dose of Cardizem.  She was given calcium gluconate, Lasix, D50 for her hyperkalemia.  Potassium improved.  Troponin peaked at 1.06.  Cardiology was consulted, who felt that the troponin elevation was demand ischemia.  Echocardiogram showed EF of 25%, with moderate MR, with no prior study available for comparison. She was cleared for endoscopy.  She underwent EGD, which showed normal esophagus, with 4 cm hiatal hernia, with gastritis in the antrum which was biopsied, with grossly normal duodenum. Tagged RBC scan showed no evidence of GI bleeding.  MoviPrep was positive.  She was transfused 1 unit of packed RBC for low Hgb with symptoms of tachycardia.  She was transitioned to oral PPI.  GI recommended holding her anticoagulation, and to do outpatient colonoscopy. her creatinine improved.  Digoxin level is normal.  PT/OT was ordered.           Interval Problem Update  4/15/2019 - I reviewed the patient's chart. There were no significant overnight events. Remains hemodynamically stable and afebrile. Stable on 2L O2 NC.  Her heart rate is better controlled.  Hemoglobin stable at 7.6.  Creatinine has improved to 1.12.  Magnesium is normal.  Sodium and potassium are normal.  Cardiology is transitioned her to oral beta-blocker.  Biopsy pathology of the gastric mucosa showed gastritis, and positive for H. Pylori. PT/OT recommending postacute facility prior to discharge to home.    > I have personally seen and examined the patient today.  She is not more short of breath than her usual.  She is still having melena.  No abdominal pain.  No chest pain.  She is not nauseated.  I told her about the PT/OT recommendation for SNF placement, and she wants to be placed at the facility in [place].      Consultants/Specialty  GI  Cardiology    Code Status  Full    Disposition  Monitor on telemetry.  SNF placement.    Review of Systems  ROS     Pertinent positives/negatives as mentioned above.     A complete review of systems was personally done by me. All other systems were negative.       Physical Exam  Temp:  [35.9 °C (96.7 °F)-36.7 °C (98.1 °F)] 36.3 °C (97.3 °F)  Pulse:  [] 108  Resp:  [16-18] 16  BP: ()/(52-79) 102/63  SpO2:  [98 %-100 %] 100 %    Physical Exam   Constitutional: She is oriented to person, place, and time. She appears well-developed. No distress.   Obese. Body mass index is 38.88 kg/m².   HENT:   Head: Normocephalic and atraumatic.   Mouth/Throat: No oropharyngeal exudate.   Eyes: Pupils are equal, round, and reactive to light. Conjunctivae are normal. No scleral icterus.   Neck: Normal range of motion. Neck supple.   Cardiovascular: Exam reveals no gallop and no friction rub.    No murmur heard.  Rate controlled, irregular rhythm.   Pulmonary/Chest: Effort normal. No respiratory distress.  She has no wheezes. She has no rales. She exhibits no tenderness.   Diminished air entry B/L bases, otherwise clear to auscultation   Abdominal: Soft. She exhibits no distension. There is no tenderness. There is no rebound and no guarding.   Musculoskeletal: She exhibits no edema or tenderness.   Lymphadenopathy:     She has no cervical adenopathy.   Neurological: She is alert and oriented to person, place, and time. No cranial nerve deficit.   Skin: Skin is warm and dry. No rash noted. She is not diaphoretic. No erythema. No pallor.   Psychiatric: She has a normal mood and affect. Her behavior is normal. Judgment and thought content normal.   Nursing note and vitals reviewed.      Fluids    Intake/Output Summary (Last 24 hours) at 04/15/19 1023  Last data filed at 04/15/19 0435   Gross per 24 hour   Intake                0 ml   Output              500 ml   Net             -500 ml       Laboratory  Recent Labs      04/13/19   0318   04/13/19   1605  04/14/19   0306  04/15/19   0305   WBC  9.2   --    --   7.9   --    RBC  2.25*   --    --   2.52*   --    HEMOGLOBIN  7.0*   < >  8.4*  7.7*  7.6*   HEMATOCRIT  23.1*   --   27.6*  24.9*  25.0*   MCV  102.7*   --    --   98.8*   --    MCH  31.1   --    --   30.6   --    MCHC  30.3*   --    --   30.9*   --    RDW  66.9*   --    --   70.4*   --    PLATELETCT  267   --    --   277   --    MPV  10.1   --    --   9.8   --     < > = values in this interval not displayed.     Recent Labs      04/13/19   0318  04/14/19   0306  04/15/19   0305   SODIUM  142  147*  144   POTASSIUM  4.1  3.3*  3.6   CHLORIDE  110  112  111   CO2  23  27  26   GLUCOSE  76  87  97   BUN  69*  39*  25*   CREATININE  1.71*  1.22  1.12   CALCIUM  7.8*  7.7*  7.5*                   Imaging  EC-ECHOCARDIOGRAM LTD W/O CONT   Final Result      NM-GI BLEEDING SCAN   Final Result      No evidence of gastrointestinal bleeding.      IR-MIDLINE CATHETER INSERTION WO GUIDANCE > AGE 3   Final Result                   Ultrasound-guided midline placement performed by qualified nursing staff    as above.          DX-CHEST-PORTABLE (1 VIEW)   Final Result         Diffuse interstitial prominence could relate to mild pulmonary edema.      Trace pleural effusions.      Cardiomegaly.      EC-ECHOCARDIOGRAM COMPLETE W/O CONT   Final Result      CT-ABDOMEN-PELVIS W/O   Final Result      1.  Ventral abdominal wall hernia containing loop of sigmoid colon with no evidence of obstruction.      2.  Right adrenal mass demonstrates characteristics consistent with adrenal adenoma.      3.  There is diverticulosis.      4.  Renal cysts are identified.      5.  Calcific atherosclerosis.           Assessment/Plan  * Acute upper GI bleeding- (present on admission)   Assessment & Plan    -EGD showed gastritis. Tagged RBC bleeding scan was negative.  Biopsy positive for H. Pylori, and showed gastritis.  -Continue oral Prilosec.  Start amoxicillin and clarithromycin for 14 days course for H. Pylori.  -Continue to monitor H&H closely, transfuse as necessary with restrictive transfusion strategy.  -needs outpatient colonoscopy, follow-up with GI as outpatient.   -Continue to hold her anticoagulation until cleared by GI as outpatient.   -Close hemodynamic monitoring on telemetry.     Hypokalemia   Assessment & Plan    -KWNL today. Trend.     Hypernatremia   Assessment & Plan    -Resolved.  Stop IV fluids.     Acute blood loss anemia from GI bleed- (present on admission)   Assessment & Plan    -Hgb remains stable. Continue to monitor hemoglobin closely, transfuse if drops below 7, or below 8 if symptomatic.     Hemorrhagic disorder due to extrinsic circulating anticoagulants (HCC)- (present on admission)   Assessment & Plan    -Continue to hold anticoagulants.  Management of GI bleed as above.     Elevated troponin- (present on admission)   Assessment & Plan    -Suspect this is type II NSTEMI due to demand ischemia from GI bleed, anemia, and  tachycardia with RVR.  Troponin has trended down.  -Continue to hold antiplatelets and anticoagulants due to GI bleed.  -Cardiology on board, with further inputs. ?  Ischemic workup prior to discharge.  -Monitor on telemetry.     Hyperkalemia- (present on admission)   Assessment & Plan    -Patient received hyperkalemia cocktail, with improvement in potassium level.  This is likely related from volume depletion, and acute kidney injury in setting of being on lisinopril.  Potassium has normalized.  -Continue to monitor renal function, and potassium levels.  Repeat BMP in the morning.  -Monitor on telemetry.     SHYAM (acute kidney injury) (HCC)- (present on admission)   Assessment & Plan    -Creatinine has significantly improved.  Stop IV fluids given her low EF.  -continue to hold lisinopril, resume if creatinine remains stable.  - avoid nephrotoxins, and continue to renally dose all medications.     Atrial fibrillation with rapid ventricular response (HCC)- (present on admission)   Assessment & Plan    -Suspect this is due to anemia, and volume depletion.   -Better control with hydration, and transition to oral metoprolol.  -Continue digoxin.  Continue metoprolol.  Cardiology following.  -Continue to hold anticoagulation due to GI bleed.  -Continue to monitor on telemetry.       Obesity- (present on admission)   Assessment & Plan    - Body mass index is 38.88 kg/m²..  - outpatient referral for outpatient weight management.     HLD (hyperlipidemia)- (present on admission)   Assessment & Plan    -Continue home dose Lipitor.     COPD (chronic obstructive pulmonary disease) (HCC)- (present on admission)   Assessment & Plan    -Not in acute exacerbation.  Continue home Symbicort, along with bronchodilators per RT protocol.     Hypothyroidism- (present on admission)   Assessment & Plan    -TSH is normal. Continue home dose Synthroid.     HTN (hypertension)- (present on admission)   Assessment & Plan    -Maintaining good  control.  -Continue metoprolol.  Continue to hold lisinopril and hydrochlorothiazide due to initial hyperkalemia and acute kidney injury.   -Continue to monitor blood pressure trend closely, with PRN IV hydralazine for significant hypertension.          VTE prophylaxis: SCD.  Anticoagulation on hold due to GI bleed

## 2019-04-15 NOTE — DISCHARGE PLANNING
Care Transition Team Assessment    LSW met with pt at bedside to complete assessment. Pt is from [place], [place] and lives alone, she was transferred to [facility] from their local facility. Pt is independent with ADLs/IADLs at baseline, though when she needs help she can get neighbors to help her. Pt was recently in the hospital in [place] and discharged with HH through [facility]. Pt has never been in a SNF before, pt's goal is to rehab and get back home. Pt has two walkers and also uses 2L O2 - continuous at baseline (through [facility]). The only relative pt has is her brother, Lul Chapman, who lives in [place], [place].    Pt uses [facility] transport for her appointments, when she is home she either walks or takes a cab to where she needs to go.     Information Source  Orientation : Oriented x 4  Information Given By: Patient  Informant's Name: Rhoda Gaines  Who is responsible for making decisions for patient? : Patient    Elopement Risk  Legal Hold: No  Ambulatory or Self Mobile in Wheelchair: No-Not an Elopement Risk  Elopement Risk: Not at Risk for Elopement    Interdisciplinary Discharge Planning  Lives with - Patient's Self Care Capacity: Alone and Able to Care For Self  Patient or legal guardian wants to designate a caregiver (see row info): No  Housing / Facility: 1 Story Apartment / Condo  Prior Services: Housekeeping / Homemaker Services    Discharge Preparedness  What is your plan after discharge?: Skilled nursing facility  What are your discharge supports?: Sibling, Other (comment) (Neighbors, Brother (Lul Chapman))  Prior Functional Level: Ambulatory, Independent with Activities of Daily Living, Independent with Medication Management, Uses Walker  Difficulity with ADLs: Bathing, Walking  Difficulty with ADLs Comment: Pt was on service with [employer] , she has 2 walkers.  Difficulity with IADLs: Shopping  Difficulity with IADL Comments: Pt walks to the store or gets someone to help her shop if needed.     Functional  Assesment  Prior Functional Level: Ambulatory, Independent with Activities of Daily Living, Independent with Medication Management, Uses Walker    Finances  Financial Barriers to Discharge: No  Prescription Coverage: Yes    Vision / Hearing Impairment  Vision Impairment : Yes  Right Eye Vision: Impaired, Wears Glasses  Left Eye Vision: Impaired, Wears Glasses  Hearing Impairment : Yes  Hearing Impairment: Both Ears, Hearing Device Not Available     Domestic Abuse  Have you ever been the victim of abuse or violence?: No    Psychological Assessment  History of Substance Abuse: None  History of Psychiatric Problems: No    Anticipated Discharge Information  Anticipated discharge disposition: SNF

## 2019-04-15 NOTE — PROGRESS NOTES
Cardiology Follow Up Progress Note    Date of Service  4/15/2019    Attending Physician  Yanick Wade M.D.    Chief Complaint   Atrial Fibrillation with RVR    HPI  Rhoda Gaines is a 78 y.o. female admitted 4/11/2019 with obesity, recent upper GI bleed, found to be in atrial fibrillation. Due to anemia, avoiding OAC.    Interim Events  No overnight events, has been incontinent with diuretics. Ambulated yesterday with PT, went about 20 yards before CRUZ and hypotension. Denies palpitations or dizziness, although has mostly been in bed.    Overnight rates controlled, around 4pm 4/14 had rates in 130s, nonsustained (possibly when ambulating). Blood pressures are controlled.     Review of Systems  Review of Systems   Constitutional: Negative for chills, diaphoresis and fever.   Respiratory: Positive for shortness of breath (stable, uses O2 at home). Negative for chest tightness.    Cardiovascular: Negative for chest pain, palpitations and leg swelling.   Endocrine: Positive for polyuria (incontinence).   Neurological: Negative for dizziness and light-headedness.       Vital signs in last 24 hours  Temp:  [35.9 °C (96.7 °F)-36.7 °C (98.1 °F)] 36.3 °C (97.3 °F)  Pulse:  [] 108  Resp:  [16-18] 16  BP: ()/(52-79) 102/63  SpO2:  [98 %-100 %] 100 %    Physical Exam  Physical Exam   Constitutional: She is oriented to person, place, and time. She appears well-developed and well-nourished. No distress.   Obese female, BMI41   HENT:   Head: Normocephalic and atraumatic.   Mouth/Throat: Oropharynx is clear and moist.   Eyes: No scleral icterus.   Neck: Neck supple. JVD present.   Cardiovascular: Normal rate.  An irregularly irregular rhythm present.   No murmur heard.  Pulses:       Radial pulses are 2+ on the right side, and 2+ on the left side.        Dorsalis pedis pulses are 2+ on the right side, and 2+ on the left side.   Pulmonary/Chest: Effort normal and breath sounds normal. No respiratory distress. She has  no rales.   Abdominal: Soft. She exhibits no distension. There is no tenderness.   Central adiposity   Musculoskeletal: She exhibits edema.   Neurological: She is alert and oriented to person, place, and time.   Skin: Skin is warm and dry. She is not diaphoretic. There is pallor.   Psychiatric: Judgment normal.   Nursing note and vitals reviewed.      Lab Review  Lab Results   Component Value Date/Time    WBC 7.9 04/14/2019 03:06 AM    RBC 2.52 (L) 04/14/2019 03:06 AM    HEMOGLOBIN 7.6 (L) 04/15/2019 03:05 AM    HEMATOCRIT 25.0 (L) 04/15/2019 03:05 AM    MCV 98.8 (H) 04/14/2019 03:06 AM    MCH 30.6 04/14/2019 03:06 AM    MCHC 30.9 (L) 04/14/2019 03:06 AM    MPV 9.8 04/14/2019 03:06 AM      Lab Results   Component Value Date/Time    SODIUM 144 04/15/2019 03:05 AM    POTASSIUM 3.6 04/15/2019 03:05 AM    CHLORIDE 111 04/15/2019 03:05 AM    CO2 26 04/15/2019 03:05 AM    GLUCOSE 97 04/15/2019 03:05 AM    BUN 25 (H) 04/15/2019 03:05 AM    CREATININE 1.12 04/15/2019 03:05 AM      Lab Results   Component Value Date/Time    ASTSGOT 19 04/11/2019 06:15 PM    ALTSGPT 16 04/11/2019 06:15 PM     Lab Results   Component Value Date/Time    TROPONINI 0.97 (H) 04/12/2019 04:04 AM             Cardiac Imaging and Procedures Review    Echocardiogram:  4/14/19  Severely reduced left ventricular systolic function.  Left ventricular ejection fraction is visually estimated to be 25%.  Global hypokinesis with regional variation.  Normal right ventricular size and systolic function.  Normal pericardium without effusion.    Assessment/Plan  Atrial fibrillation, persistent  -Metop 25mg bid --> transition to Toprol   - dig 125mcg daily  -No OAC in setting of GI bleed    LV dysfunction with setting of new AF, ?tachycardia induced   -NYHA class II-III, difficult to assess due to inactivity   - still volume overloaded on exam, start gentle Iv diuresis, would strongly recommend placing hopkins cath for incontinence to avoid skin break down. Continue  daily potassium replacement  -switch metoprolol to SR prior to discharge  - was loaded with dig 3 days ago but not continued, start 250mcg IV today, 125mcg PO tomorrow  -lisinopril 2.5mg daily    Hyperlipidemia  -Lipitor 20mg    GI bleed  - omeprazole 40mg  -Hb 7/7  -followed by primary team    Thank you for allowing me to participate in the care of this patient.  I will continue to follow this patient  Staffed with Dr. Song

## 2019-04-15 NOTE — PROGRESS NOTES
D/c'd hopkins per doctor's order. Pt has been leaking urine around the hopkins. Pt also c/o bladder spasms that cause her to be incontinent of bladder. MD aware of spasms.

## 2019-04-15 NOTE — THERAPY
"Occupational Therapy Evaluation completed.   Functional Status:  Pt incontinent of bowel at start of session with total A for clean up/hygiene.  Pt got to EOB with min A.  Max A to don socks, min A to change gown.  Pt stood and walked hallway with FWW but very easily fatigued and with increased HR.  Pt returned to supine with min A.  Plan of Care: Will benefit from Occupational Therapy 3 times per week  Discharge Recommendations:  Equipment: Will Continue to Assess for Equipment Needs. Post-acute therapy prior to DC home    Pt is 79 y/o F seen for OT evaluation. Pt admitted with GI bleed and afib. Pt with hx of COPD. Pt is very motivated but currently presents with limited activity tolerance and self-care. Pt with tachycardia when OOB. Pt will continue to benefit from acute OT services and will likely require further therapy at a post acute facility prior to DC home.    See \"Rehab Therapy-Acute\" Patient Summary Report for complete documentation.    "

## 2019-04-15 NOTE — PROGRESS NOTES
· 2 RN skin check complete with TAY Adames.  · Devices in place SCDs, heel float boots, nasal cannula.  · Skin assessed under devices pink and blanching.  · Confirmed pressure ulcers found on N/A.  · New potential pressure ulcers noted on N/A.   · The following interventions in place soft, silicone tubing, waffle mattress, heel float boots, SCDs with tubing secured away from skin.     Patient's bilateral ears are pink and blanching.  Patient's bilateral arms have scattered bruising and scabs.  Patient's bilateral elbows are pink and blanching.  Patient's panus is pink, blanching, and moist, with fresh interdry in place.  Patient's bilateral legs have scattered bruising and scabs.  Patient's inner thighs and groin area are pink and blanching where hopkins catheter is placed.  Patient's bilateral feet are dry, calloused, and flaky.  Patient's bilateral heels are pink, boggy, and slow to josephine.  Patient has bilateral bunions on medial metatarsals.  Patient has corn on upper side of second toe of left foot.

## 2019-04-15 NOTE — DISCHARGE PLANNING
Anticipated Discharge Disposition: D/C to SNF    Action: LSW discussed recommendation for SNF with pt. Pt initially was hesitant as she reported she is fearful she won't get to leave if she goes, LSW explained to pt that at this time it is anticipated pt will be able to get rehab from SNF and can d/c home. LSW explained difference between long-term placement and short-term stay. Pt agreeable and made choice for [facility] ([facility]st) and [facility] (2nd).    Barriers to Discharge: SNF acceptance.    Plan: LSW to f/u with pt regarding SNF decisions once responses have been received.

## 2019-04-15 NOTE — CARE PLAN
Problem: Infection  Goal: Will remain free from infection  Outcome: PROGRESSING AS EXPECTED  Patient will not develop infection during shift.    Problem: Venous Thromboembolism (VTW)/Deep Vein Thrombosis (DVT) Prevention:  Goal: Patient will participate in Venous Thrombosis (VTE)/Deep Vein Thrombosis (DVT)Prevention Measures  Outcome: PROGRESSING AS EXPECTED  Patient will not develop DVT during shift.    Problem: Skin Integrity  Goal: Risk for impaired skin integrity will decrease  Outcome: PROGRESSING AS EXPECTED  Patient will not have further skin breakdown during shift.

## 2019-04-15 NOTE — DIETARY
NUTRITION SERVICES: BMI - Pt with BMI >40 (=Body mass index is 41.98 kg/m².). Weight loss counseling not appropriate in acute care setting. RECOMMEND - Referral to outpatient nutrition services for weight management after D/C.

## 2019-04-16 PROBLEM — K92.2 ACUTE GI BLEEDING: Status: RESOLVED | Noted: 2019-04-11 | Resolved: 2019-04-16

## 2019-04-16 PROBLEM — I48.91 ATRIAL FIBRILLATION WITH RAPID VENTRICULAR RESPONSE (HCC): Status: RESOLVED | Noted: 2019-04-11 | Resolved: 2019-04-16

## 2019-04-16 PROBLEM — E87.5 HYPERKALEMIA: Status: RESOLVED | Noted: 2019-04-11 | Resolved: 2019-04-16

## 2019-04-16 PROBLEM — N17.9 AKI (ACUTE KIDNEY INJURY) (HCC): Status: RESOLVED | Noted: 2019-04-11 | Resolved: 2019-04-16

## 2019-04-16 PROBLEM — E87.0 HYPERNATREMIA: Status: RESOLVED | Noted: 2019-04-14 | Resolved: 2019-04-16

## 2019-04-16 LAB
ANION GAP SERPL CALC-SCNC: 8 MMOL/L (ref 0–11.9)
BASOPHILS # BLD AUTO: 0.5 % (ref 0–1.8)
BASOPHILS # BLD: 0.05 K/UL (ref 0–0.12)
BNP SERPL-MCNC: 2082 PG/ML (ref 0–100)
BUN SERPL-MCNC: 22 MG/DL (ref 8–22)
CALCIUM SERPL-MCNC: 7.8 MG/DL (ref 8.5–10.5)
CHLORIDE SERPL-SCNC: 105 MMOL/L (ref 96–112)
CHOLEST SERPL-MCNC: 66 MG/DL (ref 100–199)
CO2 SERPL-SCNC: 30 MMOL/L (ref 20–33)
CREAT SERPL-MCNC: 1.11 MG/DL (ref 0.5–1.4)
EOSINOPHIL # BLD AUTO: 0.14 K/UL (ref 0–0.51)
EOSINOPHIL NFR BLD: 1.4 % (ref 0–6.9)
ERYTHROCYTE [DISTWIDTH] IN BLOOD BY AUTOMATED COUNT: 68.8 FL (ref 35.9–50)
EST. AVERAGE GLUCOSE BLD GHB EST-MCNC: 108 MG/DL
GLUCOSE SERPL-MCNC: 84 MG/DL (ref 65–99)
HBA1C MFR BLD: 5.4 % (ref 0–5.6)
HCT VFR BLD AUTO: 27.8 % (ref 37–47)
HDLC SERPL-MCNC: 34 MG/DL
HGB BLD-MCNC: 8.3 G/DL (ref 12–16)
IMM GRANULOCYTES # BLD AUTO: 0.32 K/UL (ref 0–0.11)
IMM GRANULOCYTES NFR BLD AUTO: 3.1 % (ref 0–0.9)
LDLC SERPL CALC-MCNC: 20 MG/DL
LYMPHOCYTES # BLD AUTO: 1.04 K/UL (ref 1–4.8)
LYMPHOCYTES NFR BLD: 10.1 % (ref 22–41)
MCH RBC QN AUTO: 30.3 PG (ref 27–33)
MCHC RBC AUTO-ENTMCNC: 29.9 G/DL (ref 33.6–35)
MCV RBC AUTO: 101.5 FL (ref 81.4–97.8)
MONOCYTES # BLD AUTO: 0.8 K/UL (ref 0–0.85)
MONOCYTES NFR BLD AUTO: 7.8 % (ref 0–13.4)
MORPHOLOGY BLD-IMP: NORMAL
NEUTROPHILS # BLD AUTO: 7.91 K/UL (ref 2–7.15)
NEUTROPHILS NFR BLD: 77.1 % (ref 44–72)
NRBC # BLD AUTO: 0 K/UL
NRBC BLD-RTO: 0 /100 WBC
PLATELET # BLD AUTO: 333 K/UL (ref 164–446)
PMV BLD AUTO: 10 FL (ref 9–12.9)
POTASSIUM SERPL-SCNC: 3.3 MMOL/L (ref 3.6–5.5)
RBC # BLD AUTO: 2.74 M/UL (ref 4.2–5.4)
SODIUM SERPL-SCNC: 143 MMOL/L (ref 135–145)
TRIGL SERPL-MCNC: 62 MG/DL (ref 0–149)
WBC # BLD AUTO: 10.3 K/UL (ref 4.8–10.8)

## 2019-04-16 PROCEDURE — 700102 HCHG RX REV CODE 250 W/ 637 OVERRIDE(OP): Performed by: HOSPITALIST

## 2019-04-16 PROCEDURE — 83880 ASSAY OF NATRIURETIC PEPTIDE: CPT

## 2019-04-16 PROCEDURE — 83036 HEMOGLOBIN GLYCOSYLATED A1C: CPT

## 2019-04-16 PROCEDURE — A9270 NON-COVERED ITEM OR SERVICE: HCPCS | Performed by: PHYSICIAN ASSISTANT

## 2019-04-16 PROCEDURE — 51798 US URINE CAPACITY MEASURE: CPT

## 2019-04-16 PROCEDURE — 97530 THERAPEUTIC ACTIVITIES: CPT

## 2019-04-16 PROCEDURE — A9270 NON-COVERED ITEM OR SERVICE: HCPCS | Performed by: HOSPITALIST

## 2019-04-16 PROCEDURE — 700101 HCHG RX REV CODE 250: Performed by: HOSPITALIST

## 2019-04-16 PROCEDURE — 700102 HCHG RX REV CODE 250 W/ 637 OVERRIDE(OP): Performed by: INTERNAL MEDICINE

## 2019-04-16 PROCEDURE — A9270 NON-COVERED ITEM OR SERVICE: HCPCS | Performed by: INTERNAL MEDICINE

## 2019-04-16 PROCEDURE — 99233 SBSQ HOSP IP/OBS HIGH 50: CPT | Performed by: INTERNAL MEDICINE

## 2019-04-16 PROCEDURE — 94760 N-INVAS EAR/PLS OXIMETRY 1: CPT

## 2019-04-16 PROCEDURE — 80048 BASIC METABOLIC PNL TOTAL CA: CPT

## 2019-04-16 PROCEDURE — 99232 SBSQ HOSP IP/OBS MODERATE 35: CPT | Performed by: FAMILY MEDICINE

## 2019-04-16 PROCEDURE — 36415 COLL VENOUS BLD VENIPUNCTURE: CPT

## 2019-04-16 PROCEDURE — 94640 AIRWAY INHALATION TREATMENT: CPT

## 2019-04-16 PROCEDURE — 700102 HCHG RX REV CODE 250 W/ 637 OVERRIDE(OP): Performed by: PHYSICIAN ASSISTANT

## 2019-04-16 PROCEDURE — 85025 COMPLETE CBC W/AUTO DIFF WBC: CPT

## 2019-04-16 PROCEDURE — 700111 HCHG RX REV CODE 636 W/ 250 OVERRIDE (IP): Performed by: PHYSICIAN ASSISTANT

## 2019-04-16 PROCEDURE — 770020 HCHG ROOM/CARE - TELE (206)

## 2019-04-16 PROCEDURE — 80061 LIPID PANEL: CPT

## 2019-04-16 RX ORDER — POTASSIUM CHLORIDE 20 MEQ/1
40 TABLET, EXTENDED RELEASE ORAL DAILY
Qty: 60 TAB | Refills: 3 | Status: SHIPPED | OUTPATIENT
Start: 2019-04-16

## 2019-04-16 RX ORDER — FUROSEMIDE 20 MG/1
40 TABLET ORAL DAILY
Qty: 60 TAB | Refills: 0 | Status: SHIPPED | OUTPATIENT
Start: 2019-04-16 | End: 2019-04-19

## 2019-04-16 RX ORDER — METOPROLOL SUCCINATE 25 MG/1
25 TABLET, EXTENDED RELEASE ORAL ONCE
Status: COMPLETED | OUTPATIENT
Start: 2019-04-16 | End: 2019-04-16

## 2019-04-16 RX ORDER — AMOXICILLIN 500 MG/1
1000 CAPSULE ORAL EVERY 12 HOURS
Qty: 48 CAP | Refills: 0 | Status: SHIPPED | OUTPATIENT
Start: 2019-04-16 | End: 2019-04-28

## 2019-04-16 RX ORDER — SPIRONOLACTONE 25 MG/1
12.5 TABLET ORAL
Status: DISCONTINUED | OUTPATIENT
Start: 2019-04-16 | End: 2019-04-17

## 2019-04-16 RX ORDER — FUROSEMIDE 40 MG/1
40 TABLET ORAL
Status: DISCONTINUED | OUTPATIENT
Start: 2019-04-17 | End: 2019-04-19 | Stop reason: HOSPADM

## 2019-04-16 RX ORDER — DIGOXIN 125 MCG
125 TABLET ORAL DAILY
Qty: 30 TAB | Refills: 0 | Status: SHIPPED | OUTPATIENT
Start: 2019-04-16

## 2019-04-16 RX ORDER — CLARITHROMYCIN 500 MG/1
500 TABLET, COATED ORAL EVERY 12 HOURS
Qty: 24 TAB | Refills: 0 | Status: SHIPPED | OUTPATIENT
Start: 2019-04-16 | End: 2019-04-28

## 2019-04-16 RX ORDER — METOPROLOL SUCCINATE 50 MG/1
50 TABLET, EXTENDED RELEASE ORAL DAILY
Qty: 30 TAB | Refills: 0 | Status: SHIPPED | OUTPATIENT
Start: 2019-04-17 | End: 2019-04-19

## 2019-04-16 RX ORDER — SPIRONOLACTONE 25 MG/1
12.5 TABLET ORAL DAILY
Qty: 30 TAB | Refills: 3 | Status: SHIPPED | OUTPATIENT
Start: 2019-04-16 | End: 2019-04-19

## 2019-04-16 RX ADMIN — LEVALBUTEROL HYDROCHLORIDE 0.63 MG: 0.63 SOLUTION RESPIRATORY (INHALATION) at 14:36

## 2019-04-16 RX ADMIN — DIGOXIN 125 MCG: 125 TABLET ORAL at 19:58

## 2019-04-16 RX ADMIN — METOPROLOL SUCCINATE 25 MG: 25 TABLET, EXTENDED RELEASE ORAL at 14:33

## 2019-04-16 RX ADMIN — CLARITHROMYCIN 500 MG: 500 TABLET ORAL at 18:02

## 2019-04-16 RX ADMIN — ATORVASTATIN CALCIUM 20 MG: 20 TABLET, FILM COATED ORAL at 19:58

## 2019-04-16 RX ADMIN — BUDESONIDE AND FORMOTEROL FUMARATE DIHYDRATE 1 PUFF: 80; 4.5 AEROSOL RESPIRATORY (INHALATION) at 06:31

## 2019-04-16 RX ADMIN — LEVOTHYROXINE SODIUM 50 MCG: 50 TABLET ORAL at 06:25

## 2019-04-16 RX ADMIN — BUPROPION HYDROCHLORIDE 200 MG: 100 TABLET, FILM COATED, EXTENDED RELEASE ORAL at 18:02

## 2019-04-16 RX ADMIN — SPIRONOLACTONE 12.5 MG: 25 TABLET ORAL at 14:33

## 2019-04-16 RX ADMIN — METOPROLOL SUCCINATE 50 MG: 50 TABLET, EXTENDED RELEASE ORAL at 06:25

## 2019-04-16 RX ADMIN — AMOXICILLIN 1000 MG: 500 CAPSULE ORAL at 18:02

## 2019-04-16 RX ADMIN — OMEPRAZOLE 40 MG: 20 CAPSULE, DELAYED RELEASE ORAL at 06:25

## 2019-04-16 RX ADMIN — AMOXICILLIN 1000 MG: 500 CAPSULE ORAL at 06:31

## 2019-04-16 RX ADMIN — SENNOSIDES, DOCUSATE SODIUM 2 TABLET: 50; 8.6 TABLET, FILM COATED ORAL at 18:03

## 2019-04-16 RX ADMIN — BUPROPION HYDROCHLORIDE 200 MG: 100 TABLET, FILM COATED, EXTENDED RELEASE ORAL at 06:25

## 2019-04-16 RX ADMIN — LISINOPRIL 2.5 MG: 5 TABLET ORAL at 18:02

## 2019-04-16 RX ADMIN — BUDESONIDE AND FORMOTEROL FUMARATE DIHYDRATE 1 PUFF: 80; 4.5 AEROSOL RESPIRATORY (INHALATION) at 18:03

## 2019-04-16 RX ADMIN — POTASSIUM CHLORIDE 40 MEQ: 1500 TABLET, EXTENDED RELEASE ORAL at 06:24

## 2019-04-16 RX ADMIN — THERA TABS 1 TABLET: TAB at 06:25

## 2019-04-16 RX ADMIN — FUROSEMIDE 20 MG: 10 INJECTION, SOLUTION INTRAMUSCULAR; INTRAVENOUS at 06:24

## 2019-04-16 RX ADMIN — CLARITHROMYCIN 500 MG: 500 TABLET ORAL at 06:31

## 2019-04-16 ASSESSMENT — ENCOUNTER SYMPTOMS
DEPRESSION: 0
DIZZINESS: 0
UNEXPECTED WEIGHT CHANGE: 0
CHILLS: 0
CHEST TIGHTNESS: 0
TINGLING: 0
HEADACHES: 0
ABDOMINAL DISTENTION: 0
NECK PAIN: 0
BRUISES/BLEEDS EASILY: 0
BLURRED VISION: 0
FEVER: 0
COUGH: 0
WHEEZING: 1
MYALGIAS: 0
PALPITATIONS: 0
VOMITING: 0
DOUBLE VISION: 0
HEARTBURN: 0
NAUSEA: 0
HEMOPTYSIS: 0

## 2019-04-16 ASSESSMENT — GAIT ASSESSMENTS
GAIT LEVEL OF ASSIST: CONTACT GUARD ASSIST
DEVIATION: SHUFFLED GAIT
ASSISTIVE DEVICE: FRONT WHEEL WALKER
DISTANCE (FEET): 90

## 2019-04-16 ASSESSMENT — COGNITIVE AND FUNCTIONAL STATUS - GENERAL
MOVING FROM LYING ON BACK TO SITTING ON SIDE OF FLAT BED: A LITTLE
MOBILITY SCORE: 17
STANDING UP FROM CHAIR USING ARMS: A LITTLE
SUGGESTED CMS G CODE MODIFIER MOBILITY: CK
MOVING TO AND FROM BED TO CHAIR: A LITTLE
CLIMB 3 TO 5 STEPS WITH RAILING: A LOT
WALKING IN HOSPITAL ROOM: A LITTLE
TURNING FROM BACK TO SIDE WHILE IN FLAT BAD: A LITTLE

## 2019-04-16 ASSESSMENT — PATIENT HEALTH QUESTIONNAIRE - PHQ9
SUM OF ALL RESPONSES TO PHQ9 QUESTIONS 1 AND 2: 0
2. FEELING DOWN, DEPRESSED, IRRITABLE, OR HOPELESS: NOT AT ALL
1. LITTLE INTEREST OR PLEASURE IN DOING THINGS: NOT AT ALL

## 2019-04-16 NOTE — CARE PLAN
Problem: Bowel/Gastric:  Goal: Normal bowel function is maintained or improved  Outcome: PROGRESSING AS EXPECTED      Problem: Knowledge Deficit  Goal: Knowledge of the prescribed therapeutic regimen will improve  Outcome: PROGRESSING AS EXPECTED  Patient educated on the purpose of bladder scan q4hrs.    Problem: Respiratory:  Goal: Respiratory status will improve  Outcome: PROGRESSING SLOWER THAN EXPECTED      Problem: Mobility  Goal: Risk for activity intolerance will decrease  Outcome: PROGRESSING AS EXPECTED  Patient reminded to use call bell if assistance is needed. Call bell, personal belongings within reach.

## 2019-04-16 NOTE — DISCHARGE PLANNING
Anticipated Discharge Disposition: D/C to SNF    Action: LSW met with pt at bedside and discussed accepting SNF and available bed at [facility]. Pt agreeable to transfer, she will need some clothing to wear. Pt would like LSW to attempt to locate her brother and call him. LSW updated MD and RN regarding above. MD will be completing d/c summary and order. Transport is tentatively arranged for 1600.    Barriers to Discharge: None.    Plan: LSW to complete COBRA and transfer packet within 2 hours of anticipated transport time.

## 2019-04-16 NOTE — PROGRESS NOTES
Telemetry Shift Summary    Rhythm Afib  HR Range 72-86  Ectopy rPVC, couplets, 4bts VT  Measurements -/0.10/-        Normal Values  Rhythm SR  HR Range    Measurements 0.12-0.20 / 0.06-0.10  / 0.30-0.52

## 2019-04-16 NOTE — DISCHARGE PLANNING
Agency/Facility Name: [facility]  Spoke To: Brianda  Outcome: Patient accepted pending bed availability.

## 2019-04-16 NOTE — DISCHARGE SUMMARY
Discharge Summary    CHIEF COMPLAINT ON ADMISSION  Chief Complaint   Patient presents with   • Weakness   • GI Problem       Reason for Admission  EMS     CODE STATUS  Full Code    HPI & HOSPITAL COURSE  This is a 78 y.o. Female with past medical history of A. fib on Eliquis, history of COPD with chronic respiratory failure on home oxygen, and history of hypothyroidism comes in with melena.  Apparently patient was recently diagnosed with congestive heart failure and A. fib and was seen in ER [place] where she was discharged on anticoagulation.  After that patient started to notice dark stools but at the same time she was also on iron supplements.  Her hemoglobin baseline was around 9 and at the other facility prior to transfer was 7.9 mg/dL.  Her stool occult test was positive.  She was transferred here for further evaluation for GI bleed.  GI consulted.  EGD was done which showed 4 cm hiatal hernia and gastritis in the antrum.  The esophagus was grossly normal.  No fresh blood or clots seen on EGD.  Her H. pylori antigen came back positive.  Started on Biaxin and amoxicillin.  Eliquis has been held.  She was transfused upon admission.  Her H&H has been stable improve over hospital course.  She also noted to have tachycardia.  Initially felt to be due to worsening her anemia.  She was given IV fluids for volume expansion.  Her troponin peaked to 1.06.  Cardiology consulted.  Echocardiogram was done showed ejection fraction of 25% with moderate mitral regurgitation.  GI recommended colonoscopy but patient refused.  She was advised to have the colonoscopy done as an outpatient.  Cardiology optimize her cardiac medications.  They recommended cardiac ischemia workup as an outpatient.  She also had acute kidney injury which improved over hospital course.  Her heart rate control medications were adjusted and her rate was better controlled.  Her electrolyte imbalance was addressed during this hospital stay.  Physical therapy  and occupational therapy assessed the patient and recommended SNF placement.  Referral was sent and patient was accepted.  On the day of discharge, her H&H has been improved.  No more melena.  She denies any chest pain.  Her CODE STATUS was discussed and she wanted her CODE STATUS to be DNR/DNI.  That was noted in the medical records.  She was hemodynamically and clinically stable.  She was cleared for discharge from medical standpoint.       Therefore, she is discharged in guarded and stable condition to skilled nursing facility.    The patient met 2-midnight criteria for an inpatient stay at the time of discharge.      FOLLOW UP ITEMS POST DISCHARGE  Follow-up with PCP at SNF as per recommendations.  Follow-up with GI as per recommendations.  Follow-up with cardiology as per recommendations.    DISCHARGE DIAGNOSES  Principal Problem (Resolved):    Acute upper GI bleeding POA: Yes  Active Problems:    Elevated troponin POA: Yes    Hemorrhagic disorder due to extrinsic circulating anticoagulants (HCC) POA: Yes    Acute blood loss anemia from GI bleed POA: Yes    Hypokalemia POA: No    HTN (hypertension) POA: Yes    Hypothyroidism POA: Yes    COPD (chronic obstructive pulmonary disease) (HCC) POA: Yes    HLD (hyperlipidemia) POA: Yes    Obesity POA: Yes  Resolved Problems:    Atrial fibrillation with rapid ventricular response (HCC) POA: Yes    SHYAM (acute kidney injury) (HCC) POA: Yes    Hyperkalemia POA: Yes    Hypernatremia POA: No      FOLLOW UP  No future appointments.  No follow-up provider specified.    MEDICATIONS ON DISCHARGE     Medication List      START taking these medications      Instructions   amoxicillin 500 MG Caps  Commonly known as:  AMOXIL   Take 2 Caps by mouth every 12 hours for 12 days.  Dose:  1000 mg     clarithromycin 500 MG Tabs  Commonly known as:  BIAXIN   Take 1 Tab by mouth every 12 hours for 12 days.  Dose:  500 mg     digoxin 125 MCG Tabs  Commonly known as:  LANOXIN   Take 1 Tab by  mouth every day at 6 PM.  Dose:  125 mcg     metoprolol SR 50 MG Tb24  Start taking on:  4/17/2019  Commonly known as:  TOPROL XL   Take 1 Tab by mouth every day.  Dose:  50 mg     multivitamin Tabs  Start taking on:  4/17/2019   Take 1 Tab by mouth every day.  Dose:  1 Tab        CHANGE how you take these medications      Instructions   furosemide 20 MG Tabs  What changed:  · how much to take  · when to take this  · reasons to take this  Commonly known as:  LASIX   Take 2 Tabs by mouth every day.  Dose:  40 mg        CONTINUE taking these medications      Instructions   albuterol 108 (90 Base) MCG/ACT Aers inhalation aerosol   Inhale 2 Puffs by mouth every 6 hours as needed for Shortness of Breath.  Dose:  2 Puff     atorvastatin 20 MG Tabs  Commonly known as:  LIPITOR   Take 20 mg by mouth every evening.  Dose:  20 mg     budesonide-formoterol 80-4.5 MCG/ACT Aero  Commonly known as:  SYMBICORT   Inhale 1 Puff by mouth 2 Times a Day.  Dose:  1 Puff     docusate sodium 100 MG Caps  Commonly known as:  COLACE   Take 100 mg by mouth 2 times a day as needed for Constipation.  Dose:  100 mg     ipratropium-albuterol 0.5-2.5 (3) MG/3ML nebulizer solution  Commonly known as:  DUONEB   3 mL by Nebulization route 4 times a day.  Dose:  3 mL     levothyroxine 50 MCG Tabs  Commonly known as:  SYNTHROID   Take 50 mcg by mouth Every morning on an empty stomach.  Dose:  50 mcg     lisinopril 2.5 MG Tabs  Commonly known as:  PRINIVIL   Take 2.5 mg by mouth every day.  Dose:  2.5 mg     omeprazole 20 MG delayed-release capsule  Commonly known as:  PRILOSEC   Take 20 mg by mouth every day.  Dose:  20 mg     polyethylene glycol/lytes Pack  Commonly known as:  MIRALAX   Take 17 g by mouth 1 time daily as needed (Constipation).  Dose:  17 g     potassium chloride SA 20 MEQ Tbcr  Commonly known as:  Kdur   Take 40 mEq by mouth 2 times a day.  Dose:  40 mEq     WELLBUTRIN  MG SR tablet  Generic drug:  buPROPion   Take 200 mg by  mouth 2 times a day.  Dose:  200 mg        STOP taking these medications    aspirin EC 81 MG Tbec  Commonly known as:  ECOTRIN     diclofenac EC 50 MG Tbec  Commonly known as:  VOLTAREN     DILTIAZem 60 MG Tabs  Commonly known as:  CARDIZEM     ELIQUIS 5mg Tabs  Generic drug:  apixaban     hydroCHLOROthiazide 25 MG Tabs  Commonly known as:  HYDRODIURIL     magnesium oxide 400 MG Tabs  Commonly known as:  MAG-OX     raNITidine 150 MG Tabs  Commonly known as:  ZANTAC            Allergies  No Known Allergies    DIET  Orders Placed This Encounter   Procedures   • Diet Order Low Fiber(GI Soft), Cardiac     Standing Status:   Standing     Number of Occurrences:   1     Order Specific Question:   Diet:     Answer:   Low Fiber(GI Soft) [2]     Order Specific Question:   Diet:     Answer:   Cardiac [6]       ACTIVITY  As tolerated.  Weight bearing as tolerated    LINES, DRAINS, AND WOUNDS  This is an automated list. Peripheral IVs will be removed prior to discharge.  Midline IV 04/13/19 Right Upper arm (Active)   Site Assessment Clean;Dry;Intact 4/16/2019  8:10 AM   Dressing Type Biopatch;Occlusive;Transparent 4/16/2019  8:10 AM   Line Status Saline locked 4/16/2019  8:10 AM   Dressing Status Clean;Dry;Intact 4/16/2019  8:10 AM   Dressing Intervention N/A 4/16/2019  8:10 AM   Dressing Change Due 04/20/19 4/14/2019  7:54 PM   Date Primary Tubing Changed 04/13/19 4/14/2019  7:54 PM   NEXT Primary Tubing Change  04/16/19 4/14/2019  7:54 PM   Infiltration Grading ([facility]) 0 4/16/2019  8:10 AM   Phlebitis Scale ([facility] Only) 0 4/16/2019  8:10 AM                     MENTAL STATUS ON TRANSFER  Level of Consciousness: Alert  Orientation : Oriented x 4  Speech: Speech Clear    CONSULTATIONS  GI  Cardiology    PROCEDURES  EGD as mentioned above    LABORATORY  Lab Results   Component Value Date    SODIUM 143 04/16/2019    POTASSIUM 3.3 (L) 04/16/2019    CHLORIDE 105 04/16/2019    CO2 30 04/16/2019    GLUCOSE 84 04/16/2019    BUN  22 04/16/2019    CREATININE 1.11 04/16/2019        Lab Results   Component Value Date    WBC 10.3 04/16/2019    HEMOGLOBIN 8.3 (L) 04/16/2019    HEMATOCRIT 27.8 (L) 04/16/2019    PLATELETCT 333 04/16/2019        Total time of the discharge process exceeds 40 minutes.

## 2019-04-16 NOTE — THERAPY
"Physical Therapy Treatment completed.   Bed Mobility:  Supine to Sit: Stand by Assist (extra time and with bed features)  Transfers: Sit to Stand: Stand by Assist  Gait: Level Of Assist: Contact Guard Assist with Front-Wheel Walker       Plan of Care: Will benefit from Physical Therapy 3 times per week  Discharge Recommendations: Equipment: Will Continue to Assess for Equipment Needs. Post-acute therapy Discharge to a transitional care facility for continued skilled therapy services.    Pt able to progress her gait distance to 90' with FWW. HR to 109bpm during gait and pt required x2 standing rest breaks due to fatigue. She did not require assist during bed mob but did utilize bed features. While here, pt will benefit from ongoing acute PT to improve her mobility. Recommend placement upon DC.      See \"Rehab Therapy-Acute\" Patient Summary Report for complete documentation.       "

## 2019-04-16 NOTE — PROGRESS NOTES
Cardiology Follow Up Progress Note    Date of Service  4/16/2019    Attending Physician  Milan Wei M.D.    Chief Complaint   Afib with RVR, new Cardiomyopathy    HPI  Rhoda Gaines is a 78 y.o. female admitted 4/11/2019 with obesity, recent upper GI bleed, found to be in atrial fibrillation. Due to anemia, avoiding OAC.    Interim Events  Breathing is stable, audibly wheezing. Has not been out of bed since Sunday when she had RVR. Previously lived at home, able to ambulate around the house, grocery shop.     Rates better controlled, last night afib 42-86, today high 89.     Review of Systems  Review of Systems   Constitutional: Negative for chills, fever and unexpected weight change.   Respiratory: Positive for wheezing. Negative for cough and chest tightness.    Cardiovascular: Negative for chest pain, palpitations and leg swelling.   Gastrointestinal: Negative for abdominal distention and nausea.   Endocrine: Positive for polyuria.   Neurological: Negative for dizziness and syncope.       Vital signs in last 24 hours  Temp:  [35.8 °C (96.5 °F)-36.7 °C (98.1 °F)] 36.4 °C (97.6 °F)  Pulse:  [] 84  Resp:  [16-18] 16  BP: ()/(37-83) 104/53  SpO2:  [97 %-99 %] 99 %    Physical Exam  Physical Exam   Constitutional: She is oriented to person, place, and time. She appears well-developed and well-nourished. No distress.   Obese female with minimal muscle tone, BMI 42   HENT:   Head: Normocephalic and atraumatic.   Mouth/Throat: Oropharynx is clear and moist.   Eyes: No scleral icterus.   Neck: Neck supple. JVD present.   Cardiovascular: Normal rate.  An irregularly irregular rhythm present.   No murmur heard.  Pulses:       Radial pulses are 1+ on the right side, and 1+ on the left side.        Dorsalis pedis pulses are 2+ on the right side, and 2+ on the left side.   Pulmonary/Chest: Effort normal. No respiratory distress. She has wheezes (end expiratory). She has no rales.   Abdominal: Soft. She  exhibits no distension. There is no tenderness.   Musculoskeletal: She exhibits no edema.   Neurological: She is alert and oriented to person, place, and time.   Skin: Skin is warm and dry. She is not diaphoretic.   Psychiatric: Judgment normal.   Nursing note and vitals reviewed.      Lab Review  Lab Results   Component Value Date/Time    WBC 10.3 04/16/2019 02:14 AM    RBC 2.74 (L) 04/16/2019 02:14 AM    HEMOGLOBIN 8.3 (L) 04/16/2019 02:14 AM    HEMATOCRIT 27.8 (L) 04/16/2019 02:14 AM    .5 (H) 04/16/2019 02:14 AM    MCH 30.3 04/16/2019 02:14 AM    MCHC 29.9 (L) 04/16/2019 02:14 AM    MPV 10.0 04/16/2019 02:14 AM      Lab Results   Component Value Date/Time    SODIUM 143 04/16/2019 02:14 AM    POTASSIUM 3.3 (L) 04/16/2019 02:14 AM    CHLORIDE 105 04/16/2019 02:14 AM    CO2 30 04/16/2019 02:14 AM    GLUCOSE 84 04/16/2019 02:14 AM    BUN 22 04/16/2019 02:14 AM    CREATININE 1.11 04/16/2019 02:14 AM      Lab Results   Component Value Date/Time    ASTSGOT 19 04/11/2019 06:15 PM    ALTSGPT 16 04/11/2019 06:15 PM     Lab Results   Component Value Date/Time    CHOLSTRLTOT 66 (L) 04/16/2019 02:14 AM    LDL 20 04/16/2019 02:14 AM    HDL 34 (A) 04/16/2019 02:14 AM    TRIGLYCERIDE 62 04/16/2019 02:14 AM    TROPONINI 0.97 (H) 04/12/2019 04:04 AM       Recent Labs      04/16/19   0214   BNPBTYPENAT  2082*       Cardiac Imaging and Procedures Review    Echocardiogram:  4/14/19  Severely reduced left ventricular systolic function.  Left ventricular ejection fraction is visually estimated to be 25%.  Global hypokinesis with regional variation.  Normal right ventricular size and systolic function.  Normal pericardium without effusion.    Assessment/Plan  Atrial fibrillation, persistent  -Metop SR 50mg daily, rates have been controlled while at rest  - dig 125mcg daily  -No OAC in setting of GI bleed     LV dysfunction with setting of new AF, ?tachycardia induced   -NYHA class II-III, difficult to assess due to inactivity    - volume status improved on exam, still have ways to go, need an accurate weight, have discussed with RN and tech. Unable to track I/Os due to incontinence.   - Lasix 40mg PO daily, potassium 40meq daily  -metoprolol SR 50mg QHS  -  Dig 125mcg PO   -lisinopril 2.5mg daily  - spironolactone 12.5mg daily  - outpatient evaluation for ischemic work up     Dyslipidemia, controlled  -Lipitor 20mg     GI bleed  -omeprazole 40mg  -Hb stable, no bloody BM  -followed by primary team    Prior to discharge: standing weight, needs to ambulate to assess rate control/symptoms with new medications. Start nitish antagonist. Need plan for GI follow up. She should be optimized for discharge tomorrow     Future Appointments  Date Time Provider Department Center   5/7/2019 2:00 PM Tomasa Law [facility] None       Thank you for allowing me to participate in the care of this patient.  I will continue to follow this patient  Staffed with Dr. Song

## 2019-04-16 NOTE — HEART FAILURE PROGRAM
Cardiovascular Nurse Navigator () Advanced Heart Failure Program Inpatient Consult Note:     Hospital Admit Date: 4/11/19, I found patient on cardiology rounding list. She has not been showing up on HF reports because cardiomyopathy is being diagnosed rather than HF.     Patient also being treated for AF c RVR and upper GI bleed. Current plans are to go to SNF.     HFrEF (25%)  NYHA: II-III per GARY Kenney's note  De [place]/Exacerbation: De azalia  Not considered at this time for cardioMEMS commercial or GUIDE HF due to new diagnosis  Diuresis: still on IV furosemide    Demographics:    · Insurance: Medicare and Medicaid  · Residence: [facility] Secondary Prevention interventions:    · Pneumococcal vaccine: prevnar and pneumovax since age 64 per admit sohail  · Influenza vaccine: prev this season per admit sohail    HFrEF GDMT:    · ACE-I/ARB/ARNI: lisinopril  · Evidence Based BB (bisoprolol, carvedilol, or Toprol XL): toprol XL  · Aldosterone receptor antagonist: Not currently prescribed, will need to be addressed: prescribed or a provider note indicating why not prescribed, prior to discharge.   · AC for AF: per Dr. Kenney on 4/14, no anticoagulation due to bleeding.      BEDSIDE NURSING Daily Weights and Intake and Output not ordered, I have ordered them.    · Every HF patient should have daily weights and I's and O's  · Please weigh patient daily on a standing scale if not clinically contraindicated.   · While doing this, teach patient to write weight down on the Symptom Tracker in the HF book - and ask them what they would do with that weight at home (ie: call for 3# weight gain). This is an excellent opportunity for impactful, in the moment teaching.  Providers rely on your complete documentation of weights and I's and O's to decide whether or not our treatment is working and whether or not a HF patient is ready to discharge!    If pt does not own a working scale and cannot afford to purchase one, please provide  "one. Scales can be found in the Care Coordination Rooms on T[address].    [place] [employer] Notes:   Regarding SNF  Therapy Notes:   Needs SNF  Advanced Care Planning:  No AD on file. Full code status at the time of this note's filing.    The ACC recommends engaging palliative care as part of optimization of HFrEF treatment to solicit goals of care and focus on quality of life throughout the clinical course of HF.    Once all diagnostics are in, please consider an order for palliative to discuss Advanced Care Planning.      Speaking with patients frankly about their end-of-life wishes is one of the most important things a palliative care team can do. This is especially important in the context of heart failure, since it’s such an unpredictable disease.    Follow up appointment:   If discharged from acute care to home (exception hospice discharge), pt must have an appointment scheduled within 7 days of discharge (Cardiology, PCP, or DC Clinic).    If discharged to Transitional Care Facility (LTAC, SNF, IRH), appointment should be made about a month out for after TCF.    Bedside Nursing Education:  Please provide HF booklet and repeated, ongoing education while administering medications, weighing patient, discussing management of symptoms, diet and need to follow up and act on changes.    Bedside Nursing Discharge:  When completing the after visit summary (discharge instructions) please select \"Cardiac Diagnosis, and Heart Failure\" in the special instructions section to populate the heart failure specific discharge instructions.     Referrals/Orders Placed:    Hospital Schedulers for HF f/u?  yes  Social Work   following  Registered Dietician  yes  [facility] CP Program for patients with Medicaid, [facility], or prison Plus coverage?  no  Outpatient Care Coordination for patients with [employer] coverage and with 3 or more admissions within a year?  no    Dulce Maria DAVILA RN, [facility] ext. [phone] M-F        "

## 2019-04-16 NOTE — DISCHARGE PLANNING
Received Transport Form @ 1122  Spoke to Marshall @ [facility]    Transport is scheduled for 4/16 @1600 going to [facility].

## 2019-04-17 ENCOUNTER — APPOINTMENT (OUTPATIENT)
Dept: RADIOLOGY | Facility: MEDICAL CENTER | Age: 79
DRG: 813 | End: 2019-04-17
Attending: FAMILY MEDICINE
Payer: MEDICARE

## 2019-04-17 PROBLEM — J96.01 ACUTE RESPIRATORY FAILURE WITH HYPOXIA (HCC): Status: ACTIVE | Noted: 2019-04-17

## 2019-04-17 PROBLEM — I50.21 ACUTE SYSTOLIC CONGESTIVE HEART FAILURE (HCC): Status: ACTIVE | Noted: 2019-04-17

## 2019-04-17 LAB
ALBUMIN SERPL BCP-MCNC: 2.9 G/DL (ref 3.2–4.9)
ALBUMIN/GLOB SERPL: 1.4 G/DL
ALP SERPL-CCNC: 56 U/L (ref 30–99)
ALT SERPL-CCNC: 12 U/L (ref 2–50)
ANION GAP SERPL CALC-SCNC: 7 MMOL/L (ref 0–11.9)
AST SERPL-CCNC: 19 U/L (ref 12–45)
BASOPHILS # BLD AUTO: 0.3 % (ref 0–1.8)
BASOPHILS # BLD AUTO: 0.4 % (ref 0–1.8)
BASOPHILS # BLD: 0.03 K/UL (ref 0–0.12)
BASOPHILS # BLD: 0.04 K/UL (ref 0–0.12)
BILIRUB SERPL-MCNC: 0.5 MG/DL (ref 0.1–1.5)
BUN SERPL-MCNC: 24 MG/DL (ref 8–22)
CALCIUM SERPL-MCNC: 7.7 MG/DL (ref 8.5–10.5)
CHLORIDE SERPL-SCNC: 105 MMOL/L (ref 96–112)
CO2 SERPL-SCNC: 30 MMOL/L (ref 20–33)
CREAT SERPL-MCNC: 1.37 MG/DL (ref 0.5–1.4)
EOSINOPHIL # BLD AUTO: 0.19 K/UL (ref 0–0.51)
EOSINOPHIL # BLD AUTO: 0.2 K/UL (ref 0–0.51)
EOSINOPHIL NFR BLD: 2.1 % (ref 0–6.9)
EOSINOPHIL NFR BLD: 2.2 % (ref 0–6.9)
ERYTHROCYTE [DISTWIDTH] IN BLOOD BY AUTOMATED COUNT: 63.3 FL (ref 35.9–50)
ERYTHROCYTE [DISTWIDTH] IN BLOOD BY AUTOMATED COUNT: 64.4 FL (ref 35.9–50)
GLOBULIN SER CALC-MCNC: 2.1 G/DL (ref 1.9–3.5)
GLUCOSE SERPL-MCNC: 104 MG/DL (ref 65–99)
HCT VFR BLD AUTO: 28.8 % (ref 37–47)
HCT VFR BLD AUTO: 29 % (ref 37–47)
HGB BLD-MCNC: 8.7 G/DL (ref 12–16)
HGB BLD-MCNC: 8.7 G/DL (ref 12–16)
IMM GRANULOCYTES # BLD AUTO: 0.29 K/UL (ref 0–0.11)
IMM GRANULOCYTES # BLD AUTO: 0.31 K/UL (ref 0–0.11)
IMM GRANULOCYTES NFR BLD AUTO: 3.2 % (ref 0–0.9)
IMM GRANULOCYTES NFR BLD AUTO: 3.4 % (ref 0–0.9)
LYMPHOCYTES # BLD AUTO: 0.92 K/UL (ref 1–4.8)
LYMPHOCYTES # BLD AUTO: 1 K/UL (ref 1–4.8)
LYMPHOCYTES NFR BLD: 10.2 % (ref 22–41)
LYMPHOCYTES NFR BLD: 11.1 % (ref 22–41)
MCH RBC QN AUTO: 30.1 PG (ref 27–33)
MCH RBC QN AUTO: 30.1 PG (ref 27–33)
MCHC RBC AUTO-ENTMCNC: 30 G/DL (ref 33.6–35)
MCHC RBC AUTO-ENTMCNC: 30.2 G/DL (ref 33.6–35)
MCV RBC AUTO: 100.3 FL (ref 81.4–97.8)
MCV RBC AUTO: 99.7 FL (ref 81.4–97.8)
MONOCYTES # BLD AUTO: 0.76 K/UL (ref 0–0.85)
MONOCYTES # BLD AUTO: 0.83 K/UL (ref 0–0.85)
MONOCYTES NFR BLD AUTO: 8.4 % (ref 0–13.4)
MONOCYTES NFR BLD AUTO: 9.2 % (ref 0–13.4)
NEUTROPHILS # BLD AUTO: 6.69 K/UL (ref 2–7.15)
NEUTROPHILS # BLD AUTO: 6.78 K/UL (ref 2–7.15)
NEUTROPHILS NFR BLD: 74 % (ref 44–72)
NEUTROPHILS NFR BLD: 75.5 % (ref 44–72)
NRBC # BLD AUTO: 0 K/UL
NRBC # BLD AUTO: 0 K/UL
NRBC BLD-RTO: 0 /100 WBC
NRBC BLD-RTO: 0 /100 WBC
PLATELET # BLD AUTO: 321 K/UL (ref 164–446)
PLATELET # BLD AUTO: 322 K/UL (ref 164–446)
PMV BLD AUTO: 10 FL (ref 9–12.9)
PMV BLD AUTO: 9.6 FL (ref 9–12.9)
POTASSIUM SERPL-SCNC: 3.8 MMOL/L (ref 3.6–5.5)
PROT SERPL-MCNC: 5 G/DL (ref 6–8.2)
RBC # BLD AUTO: 2.89 M/UL (ref 4.2–5.4)
RBC # BLD AUTO: 2.89 M/UL (ref 4.2–5.4)
SODIUM SERPL-SCNC: 142 MMOL/L (ref 135–145)
WBC # BLD AUTO: 9 K/UL (ref 4.8–10.8)
WBC # BLD AUTO: 9 K/UL (ref 4.8–10.8)

## 2019-04-17 PROCEDURE — A9270 NON-COVERED ITEM OR SERVICE: HCPCS | Performed by: INTERNAL MEDICINE

## 2019-04-17 PROCEDURE — 700102 HCHG RX REV CODE 250 W/ 637 OVERRIDE(OP): Performed by: HOSPITALIST

## 2019-04-17 PROCEDURE — A9270 NON-COVERED ITEM OR SERVICE: HCPCS | Performed by: PHYSICIAN ASSISTANT

## 2019-04-17 PROCEDURE — A9270 NON-COVERED ITEM OR SERVICE: HCPCS | Performed by: HOSPITALIST

## 2019-04-17 PROCEDURE — 700102 HCHG RX REV CODE 250 W/ 637 OVERRIDE(OP): Performed by: INTERNAL MEDICINE

## 2019-04-17 PROCEDURE — 700105 HCHG RX REV CODE 258

## 2019-04-17 PROCEDURE — 770020 HCHG ROOM/CARE - TELE (206)

## 2019-04-17 PROCEDURE — 71045 X-RAY EXAM CHEST 1 VIEW: CPT

## 2019-04-17 PROCEDURE — 99233 SBSQ HOSP IP/OBS HIGH 50: CPT | Performed by: FAMILY MEDICINE

## 2019-04-17 PROCEDURE — 85025 COMPLETE CBC W/AUTO DIFF WBC: CPT

## 2019-04-17 PROCEDURE — 80053 COMPREHEN METABOLIC PANEL: CPT

## 2019-04-17 PROCEDURE — 36415 COLL VENOUS BLD VENIPUNCTURE: CPT

## 2019-04-17 PROCEDURE — 99233 SBSQ HOSP IP/OBS HIGH 50: CPT | Performed by: INTERNAL MEDICINE

## 2019-04-17 PROCEDURE — 700102 HCHG RX REV CODE 250 W/ 637 OVERRIDE(OP): Performed by: PHYSICIAN ASSISTANT

## 2019-04-17 RX ORDER — SODIUM CHLORIDE 9 MG/ML
500 INJECTION, SOLUTION INTRAVENOUS ONCE
Status: COMPLETED | OUTPATIENT
Start: 2019-04-17 | End: 2019-04-17

## 2019-04-17 RX ORDER — MIDODRINE HYDROCHLORIDE 5 MG/1
10 TABLET ORAL ONCE
Status: DISCONTINUED | OUTPATIENT
Start: 2019-04-17 | End: 2019-04-17

## 2019-04-17 RX ORDER — SODIUM CHLORIDE 9 MG/ML
INJECTION, SOLUTION INTRAVENOUS
Status: COMPLETED
Start: 2019-04-17 | End: 2019-04-17

## 2019-04-17 RX ADMIN — BUPROPION HYDROCHLORIDE 200 MG: 100 TABLET, FILM COATED, EXTENDED RELEASE ORAL at 18:26

## 2019-04-17 RX ADMIN — SPIRONOLACTONE 12.5 MG: 25 TABLET ORAL at 06:04

## 2019-04-17 RX ADMIN — AMOXICILLIN 1000 MG: 500 CAPSULE ORAL at 18:26

## 2019-04-17 RX ADMIN — CLARITHROMYCIN 500 MG: 500 TABLET ORAL at 06:12

## 2019-04-17 RX ADMIN — BUPROPION HYDROCHLORIDE 200 MG: 100 TABLET, FILM COATED, EXTENDED RELEASE ORAL at 06:11

## 2019-04-17 RX ADMIN — THERA TABS 1 TABLET: TAB at 06:11

## 2019-04-17 RX ADMIN — ATORVASTATIN CALCIUM 20 MG: 20 TABLET, FILM COATED ORAL at 20:29

## 2019-04-17 RX ADMIN — AMOXICILLIN 1000 MG: 500 CAPSULE ORAL at 06:11

## 2019-04-17 RX ADMIN — SODIUM CHLORIDE 500 ML: 9 INJECTION, SOLUTION INTRAVENOUS at 04:00

## 2019-04-17 RX ADMIN — POTASSIUM CHLORIDE 40 MEQ: 1500 TABLET, EXTENDED RELEASE ORAL at 06:10

## 2019-04-17 RX ADMIN — OMEPRAZOLE 40 MG: 20 CAPSULE, DELAYED RELEASE ORAL at 06:11

## 2019-04-17 RX ADMIN — DIGOXIN 125 MCG: 125 TABLET ORAL at 18:26

## 2019-04-17 RX ADMIN — CLARITHROMYCIN 500 MG: 500 TABLET ORAL at 20:29

## 2019-04-17 RX ADMIN — BUDESONIDE AND FORMOTEROL FUMARATE DIHYDRATE 1 PUFF: 80; 4.5 AEROSOL RESPIRATORY (INHALATION) at 18:30

## 2019-04-17 RX ADMIN — LEVOTHYROXINE SODIUM 50 MCG: 50 TABLET ORAL at 06:11

## 2019-04-17 RX ADMIN — BUDESONIDE AND FORMOTEROL FUMARATE DIHYDRATE 1 PUFF: 80; 4.5 AEROSOL RESPIRATORY (INHALATION) at 06:11

## 2019-04-17 ASSESSMENT — ENCOUNTER SYMPTOMS
PHOTOPHOBIA: 0
COUGH: 0
ORTHOPNEA: 0
CHEST TIGHTNESS: 0
DIZZINESS: 0
DOUBLE VISION: 0
BLURRED VISION: 0
CHILLS: 0
MYALGIAS: 0
PALPITATIONS: 1
TINGLING: 0
ABDOMINAL PAIN: 0
ABDOMINAL DISTENTION: 0
NECK PAIN: 0
NAUSEA: 0
HEMOPTYSIS: 0
WHEEZING: 1
VOMITING: 0
FEVER: 0
BACK PAIN: 0
PALPITATIONS: 0
SHORTNESS OF BREATH: 1
HEARTBURN: 0
HEADACHES: 0
WEAKNESS: 1
LIGHT-HEADEDNESS: 0
BRUISES/BLEEDS EASILY: 0
TREMORS: 0
DEPRESSION: 0

## 2019-04-17 ASSESSMENT — LIFESTYLE VARIABLES: SUBSTANCE_ABUSE: 0

## 2019-04-17 NOTE — PROGRESS NOTES
Rapid Response called at 0355 for low blood pressures.    Rapid Response called at 0650 for low blood pressures.     See Rapid Response documentation.

## 2019-04-17 NOTE — PROGRESS NOTES
2 RN skin check done with Corry CROSS.  Coccyx pink, blanchable. Scattered bruises to arms, legs and hip, in various stages of healing.  Dry skin to feet. Heel protectors on.  Ears pink, blanchable

## 2019-04-17 NOTE — PROGRESS NOTES
Hospital Medicine Daily Progress Note    Date of Service  4/16/2019    Chief Complaint  Weakness, poor appetite, nausea, melena    Hospital Course    78 y.o. female with COPD, hypertension, hypothyroidism, atrial fibrillation on anticoagulation with Eliquis, admitted 4/11/2019 with weakness and poor appetite, nausea with retching but no vomiting, and report of persistent black stools and melena.  Initial outside hospital workup showed mildly elevated troponin of 0.7, with hemoglobin of 7.9, potassium of 6.5, and creatinine of 2.25.  Chest x-ray showed blunting of the right costophrenic angle with no consolidations, and moderate cardiomegaly.  Repeat labs here showed WBC of 12,100, and hemoglobin of 8.9, with potassium 6.2, and creatinine of 2.01.  CT of the abdomen showed ventral abdominal wall hernia containing loop of sigmoid colon with no evidence of obstruction.  She was felt to have GI bleed.  Her anticoagulation was held, and she started on PPI drip. GI was consulted.  She is also noted to be tachycardic, felt to be related to her worsening anemia.  She is started on volume expansion with IV fluids, and was given one-time dose of Cardizem.  She was given calcium gluconate, Lasix, D50 for her hyperkalemia.  Potassium improved.  Troponin peaked at 1.06.  Cardiology was consulted, who felt that the troponin elevation was demand ischemia.  Echocardiogram showed EF of 25%, with moderate MR, with no prior study available for comparison. She was cleared for endoscopy.  She underwent EGD, which showed normal esophagus, with 4 cm hiatal hernia, with gastritis in the antrum which was biopsied, with grossly normal duodenum. Tagged RBC scan showed no evidence of GI bleeding.  MoviPrep was positive.  She was transfused 1 unit of packed RBC for low Hgb with symptoms of tachycardia.  She was transitioned to oral PPI.  GI recommended holding her anticoagulation, and to do outpatient colonoscopy. her creatinine improved.  Digoxin level is normal.  PT/OT was ordered.           Interval Problem Update  4/15/2019 - I reviewed the patient's chart. There were no significant overnight events. Remains hemodynamically stable and afebrile. Stable on 2L O2 NC.  Her heart rate is better controlled.  Hemoglobin stable at 7.6.  Creatinine has improved to 1.12.  Magnesium is normal.  Sodium and potassium are normal.  Cardiology is transitioned her to oral beta-blocker.  Biopsy pathology of the gastric mucosa showed gastritis, and positive for H. Pylori. PT/OT recommending postacute facility prior to discharge to home.  4/16: Laying in bed comfortably.  Denies any chest pain.  No melena.  H&H has been stable.  Cardiology wants to keep the patient today.  I switched her to oral Lasix.  No distress noted.    Consultants/Specialty  GI  Cardiology    Code Status  Full    Disposition  Monitor on telemetry.  SNF once medically cleared.    Review of Systems  Review of Systems   Constitutional: Positive for malaise/fatigue. Negative for chills and fever.   HENT: Negative for ear pain, hearing loss and tinnitus.    Eyes: Negative for blurred vision and double vision.   Respiratory: Negative for cough and hemoptysis.    Cardiovascular: Negative for chest pain and palpitations.   Gastrointestinal: Negative for heartburn, nausea and vomiting.   Genitourinary: Negative for dysuria and urgency.   Musculoskeletal: Negative for myalgias and neck pain.   Skin: Negative for rash.   Neurological: Negative for dizziness, tingling and headaches.   Endo/Heme/Allergies: Does not bruise/bleed easily.   Psychiatric/Behavioral: Negative for depression and suicidal ideas.        Pertinent positives/negatives as mentioned above.     A complete review of systems was personally done by me. All other systems were negative.       Physical Exam  Temp:  [36.1 °C (97 °F)-36.7 °C (98.1 °F)] 36.4 °C (97.6 °F)  Pulse:  [72-94] 94  Resp:  [16-18] 18  BP: ()/(37-57) 107/51  SpO2:   [97 %-99 %] 98 %    Physical Exam   Constitutional: She is oriented to person, place, and time. No distress.   Obese. Body mass index is 38.88 kg/m².   HENT:   Head: Normocephalic and atraumatic.   Eyes: Pupils are equal, round, and reactive to light. Conjunctivae are normal. Right eye exhibits no discharge. Left eye exhibits no discharge.   Neck: Normal range of motion. Neck supple. No thyromegaly present.   Cardiovascular: Exam reveals no gallop and no friction rub.    Rate controlled, irregular rhythm.   Pulmonary/Chest: Effort normal. No respiratory distress. She has no wheezes.   Diminished air entry B/L bases, otherwise clear to auscultation   Abdominal: Soft. She exhibits no distension. There is no tenderness.   Musculoskeletal: She exhibits no tenderness or deformity.   Neurological: She is alert and oriented to person, place, and time. No cranial nerve deficit.   Skin: Skin is warm and dry. No rash noted. She is not diaphoretic. No erythema.   Psychiatric: She has a normal mood and affect. Her behavior is normal. Thought content normal.   Nursing note and vitals reviewed.      Fluids  No intake or output data in the 24 hours ending 04/16/19 2011    Laboratory  Recent Labs      04/14/19   0306  04/15/19   0305  04/16/19 0214   WBC  7.9   --   10.3   RBC  2.52*   --   2.74*   HEMOGLOBIN  7.7*  7.6*  8.3*   HEMATOCRIT  24.9*  25.0*  27.8*   MCV  98.8*   --   101.5*   MCH  30.6   --   30.3   MCHC  30.9*   --   29.9*   RDW  70.4*   --   68.8*   PLATELETCT  277   --   333   MPV  9.8   --   10.0     Recent Labs      04/14/19   0306  04/15/19   0305  04/16/19 0214   SODIUM  147*  144  143   POTASSIUM  3.3*  3.6  3.3*   CHLORIDE  112  111  105   CO2  27  26  30   GLUCOSE  87  97  84   BUN  39*  25*  22   CREATININE  1.22  1.12  1.11   CALCIUM  7.7*  7.5*  7.8*         Recent Labs      04/16/19 0214   BNPBTYPENAT  2082*     Recent Labs      04/16/19 0214   TRIGLYCERIDE  62   HDL  34*   LDL  20        Imaging  EC-ECHOCARDIOGRAM LTD W/O CONT   Final Result      NM-GI BLEEDING SCAN   Final Result      No evidence of gastrointestinal bleeding.      IR-MIDLINE CATHETER INSERTION WO GUIDANCE > AGE 3   Final Result                  Ultrasound-guided midline placement performed by qualified nursing staff    as above.          DX-CHEST-PORTABLE (1 VIEW)   Final Result         Diffuse interstitial prominence could relate to mild pulmonary edema.      Trace pleural effusions.      Cardiomegaly.      EC-ECHOCARDIOGRAM COMPLETE W/O CONT   Final Result      CT-ABDOMEN-PELVIS W/O   Final Result      1.  Ventral abdominal wall hernia containing loop of sigmoid colon with no evidence of obstruction.      2.  Right adrenal mass demonstrates characteristics consistent with adrenal adenoma.      3.  There is diverticulosis.      4.  Renal cysts are identified.      5.  Calcific atherosclerosis.           Assessment/Plan  Hypokalemia   Assessment & Plan    -KWNL today. Trend.     Acute blood loss anemia from GI bleed- (present on admission)   Assessment & Plan    -Hgb remains stable. Continue to monitor hemoglobin closely, transfuse if drops below 7, or below 8 if symptomatic.     Hemorrhagic disorder due to extrinsic circulating anticoagulants (HCC)- (present on admission)   Assessment & Plan    -Continue to hold anticoagulants.  Management of GI bleed as above.     Elevated troponin- (present on admission)   Assessment & Plan    -Suspect this is type II NSTEMI due to demand ischemia from GI bleed, anemia, and tachycardia with RVR.  Troponin has trended down.  -Continue to hold antiplatelets and anticoagulants due to GI bleed.  -Cardiology on board, with further inputs. ?  Ischemic workup prior to discharge.  -Monitor on telemetry.     Obesity- (present on admission)   Assessment & Plan    - Body mass index is 38.88 kg/m²..  - outpatient referral for outpatient weight management.     HLD (hyperlipidemia)- (present on  admission)   Assessment & Plan    -Continue home dose Lipitor.     COPD (chronic obstructive pulmonary disease) (HCC)- (present on admission)   Assessment & Plan    -Not in acute exacerbation.  Continue home Symbicort, along with bronchodilators per RT protocol.     Hypothyroidism- (present on admission)   Assessment & Plan    -TSH is normal. Continue home dose Synthroid.     HTN (hypertension)- (present on admission)   Assessment & Plan    -Maintaining good control.  -Continue metoprolol.  Continue to hold lisinopril and hydrochlorothiazide due to initial hyperkalemia and acute kidney injury.   -Continue to monitor blood pressure trend closely, with PRN IV hydralazine for significant hypertension.     Plan of care discussed with multidisciplinary team during rounds.    VTE prophylaxis: SCD.  Anticoagulation on hold due to GI bleed

## 2019-04-17 NOTE — PROGRESS NOTES
"Notified Dr. Santizo that patient's blood pressure decreased to 88/50 after NS bolus; MD advised \" to wait and watch ad do nothing\" at this time.   "

## 2019-04-17 NOTE — HEART FAILURE PROGRAM
I have contacted Dr. Wei. His discharge summary does not address why this patient is not prescribed ACE-I/ARB/ARNI and aldosterone blockade for her HFrEF.    Patient is discharging to SNF therefore her appointment at the HF Program clinic on 5/7/19 is appropriate.    Dulce Maria DAVILA RN, [facility] ext. [phone] M-F

## 2019-04-17 NOTE — PROGRESS NOTES
· 2 RN skin check complete with TAY Lam.  · Devices in place Nasal cannula, SCDs, Heel float boots.  · Skin assessed under devices red and blanching.  · Confirmed pressure ulcers found on N/A.  · New potential pressure ulcers noted on N/A  · Redness to pannus.  · The following interventions in place Interdry to pannus, heel float boots, Waffle cushion, turned every 2 hours, silicone nasal cannula, alan cream, barrier wipes, pillows to float heels and for support.

## 2019-04-17 NOTE — PROGRESS NOTES
Bedside report received from previous nurse regarding prior 12 hours.  Pt denies pain and SOB.  White board updated.  Call light within reach.

## 2019-04-17 NOTE — PROGRESS NOTES
Cardiology Follow Up Progress Note    Date of Service  4/17/2019    Attending Physician  Milan Wei M.D.     Chief Complaint   Afib with RVR, new Cardiomyopathy     HPI  Rhoda Gaines is a 78 y.o. female admitted 4/11/2019 with obesity, recent upper GI bleed, found to be in atrial fibrillation. Due to blood loss anemia, avoiding OAC.     Interim Events  BPs low overnight, she was asymptomatic during the episodes. This morning she is lying in bed, no complaints.     Ambulated yesterday with PT felt stronger than before, HR to 109bpm during gait and pt required x2 standing rest breaks due to fatigue.    A.fib 79-93 overnight  Review of Systems  Review of Systems   Constitutional: Negative for chills and fever.   Respiratory: Positive for shortness of breath (stable) and wheezing. Negative for chest tightness.    Cardiovascular: Positive for palpitations (occasional).   Gastrointestinal: Negative for abdominal distention and nausea.   Genitourinary:        Incontinence   Neurological: Negative for dizziness, syncope and light-headedness.       Vital signs in last 24 hours  Temp:  [36.4 °C (97.5 °F)-36.9 °C (98.5 °F)] 36.4 °C (97.5 °F)  Pulse:  [65-99] 75  Resp:  [16-20] 18  BP: ()/(41-66) 100/58  SpO2:  [96 %-99 %] 99 %    Physical Exam  Physical Exam   Constitutional: She is oriented to person, place, and time. She appears well-developed and well-nourished. No distress.   Obese female, BMI 38, low muscle tone, no acute distress   HENT:   Head: Normocephalic and atraumatic.   Mouth/Throat: Oropharynx is clear and moist.   Eyes: No scleral icterus.   Neck: Neck supple. JVD present.   Cardiovascular: Normal rate.  An irregularly irregular rhythm present.   No murmur heard.  Pulses:       Radial pulses are 2+ on the right side, and 2+ on the left side.        Dorsalis pedis pulses are 2+ on the right side, and 2+ on the left side.   Pulmonary/Chest: Effort normal. No respiratory distress. She has wheezes  (End expiratory, diffuse). She has rales.   Abdominal: Soft. She exhibits no distension. There is no tenderness.   Central adiposity   Musculoskeletal: She exhibits no edema.   Neurological: She is alert and oriented to person, place, and time.   Skin: Skin is warm and dry. She is not diaphoretic. There is pallor.   Psychiatric: Judgment normal.   Nursing note and vitals reviewed.      Lab Review  Lab Results   Component Value Date/Time    WBC 9.0 04/17/2019 04:11 AM    RBC 2.89 (L) 04/17/2019 04:11 AM    HEMOGLOBIN 8.7 (L) 04/17/2019 04:11 AM    HEMATOCRIT 29.0 (L) 04/17/2019 04:11 AM    .3 (H) 04/17/2019 04:11 AM    MCH 30.1 04/17/2019 04:11 AM    MCHC 30.0 (L) 04/17/2019 04:11 AM    MPV 9.6 04/17/2019 04:11 AM      Lab Results   Component Value Date/Time    SODIUM 142 04/17/2019 12:17 AM    POTASSIUM 3.8 04/17/2019 12:17 AM    CHLORIDE 105 04/17/2019 12:17 AM    CO2 30 04/17/2019 12:17 AM    GLUCOSE 104 (H) 04/17/2019 12:17 AM    BUN 24 (H) 04/17/2019 12:17 AM    CREATININE 1.37 04/17/2019 12:17 AM      Lab Results   Component Value Date/Time    ASTSGOT 19 04/17/2019 12:17 AM    ALTSGPT 12 04/17/2019 12:17 AM     Lab Results   Component Value Date/Time    CHOLSTRLTOT 66 (L) 04/16/2019 02:14 AM    LDL 20 04/16/2019 02:14 AM    HDL 34 (A) 04/16/2019 02:14 AM    TRIGLYCERIDE 62 04/16/2019 02:14 AM    TROPONINI 0.97 (H) 04/12/2019 04:04 AM       Recent Labs      04/16/19   0214   BNPBTYPENAT  2082*       Cardiac Imaging and Procedures Review  Echocardiogram:  4/14/19  Severely reduced left ventricular systolic function.  Left ventricular ejection fraction is visually estimated to be 25%.  Global hypokinesis with regional variation.  Normal right ventricular size and systolic function.  Normal pericardium without effusion.    Assessment/Plan  Atrial fibrillation, persistent  -Metop SR 75mg daily, rates have been controlled while at rest and with ambulation  - dig 125mcg daily  -No OAC in setting of GI bleed,  will follow with GI outpatient for endoscopy  - not a candidate for DCCV as cannot tolerate OAC at this time     LV dysfunction with setting of new AF, ?tachycardia induced   -NYHA class III, difficult to assess due to inactivity   - volume status improved on exam, weights down since admission although she received fluid bolus overnight for asymptomatic hypotension.  She is still volume overloaded, will try to diurese gently, she is high risk for readmission given her volume status and rhythm  - Lasix 40mg PO daily, potassium 40meq daily  - metoprolol SR 75mg QHS  -  Dig 125mcg PO   - unable to tolerate low doses of ACE and Jorge antagonist due to hypotension, high risk for falls, will need to start these in the outpatient setting  - outpatient evaluation for ischemic work up  -Educated patient regarding the suspected etiology of her heart failure as well as ways to manage her symptoms including low-sodium diet, monitor daily weights and blood pressures, she understands she will be seen in the clinic after discharge for further medical management     Dyslipidemia, controlled  -Lipitor 20mg     GI bleed  -omeprazole 40mg  -Hb stable, no bloody BM  -followed by primary team     COPD/Emphysema  - Xopenex nebulizer prior to discharge  -Supplemental O2 needs stable, 2.5 L    Future Appointments  Date Time Provider Department Center   5/7/2019 2:00 PM Tomasa Law [facility] None       Plan: monitor BPs, if adequate will need to discharge with diuretics    Thank you for allowing me to participate in the care of this patient.  Staffed with Dr. Song

## 2019-04-17 NOTE — DISCHARGE PLANNING
Anticipated Discharge Disposition: D/C to SNF    Action: LSW informed by MD that pt is not medically clear to d/c at this time. Cardiology is following.    Barriers to Discharge: Medical clearance.    Plan: LSW to arrange transport to [facility] once pt is medically clear.

## 2019-04-17 NOTE — CONSULTS
"Reason for PC Consult: Advance Care Planning    Consulted by: Dr Wei    Assessment:  General: 78 yr old female admitted on 4/11/2019 for GIB, Atrial fibrillation with rapid ventricular response, Hyperkalemia. PMH: A-fib, COPD, HTN, Psychiatric disorder-depression, Thyroid disease.   Pt transferred from the Hospital in [place] for GI bleed.     Dyspnea: Yes, resp rate 18, 94% 2 L NC  Last BM: 04/17/19   Pain: No, currently denies  Depression: Mood appropriate for situation    Dementia: No       Spiritual:  Is Nondenominational or spirituality important for coping with this illness? No    Has a  or spiritual provider visit been requested? No    Palliative Performance Scale: 50%    Advance Directive: None   DPOA: No, Kelly Chris [phone], friend   POLST: Yes     Code Status: DNR/DNI     Outcome:  Met with the pt at the bedside, introduced self and the role of Palliative care/support. Spoke about pts admission for GI bleed and cardiac co-morbities. Pt spoke about \"having COPD and Emphysema makes me feel out of breath all the time, so I thought it was just this\". Spoke about lack of aggressive intervention options, spoke about medical interventions. Discussed weights and edema as indicators. Pt spoke about being more aware of her salt intake, but also states that she rarely utilizes salt in her cooking. Discussed importance of compliance in order to have the best quality of life possible.     Spoke about quality of life and her present cardiac limitations coupled with the COPD. Pt talked about her mother, who lived to be 97, and stated that she did not wish to live that long as her quality of life was poor for the \"last ten years\". Spoke about hospice support. Pt spoke about several friends in [place] who have passed away and utilized hospice at various SNF facilities or [facility]. Pt stated that they did not have good support out there and felt better staying at home and having a POLST. Pt stated I just hope I " "drop at home and I want them to leave me alone. Pt stated that she has a brother in [place] who is currently taking care of his wife with Dementia. Pt stated that her brother knows her wishes and that by having a POLST she feels secure.     Spoke about her recent code status change to DNR/DNI. Talked about AD and POLST.   Pt spoke about her not wishing to utilize an AD as she felt they were to cumbersome. Pt stated that she prefers to just have her wishes in \"something simple, I like that\" in reference to the POLST form. Went over POLST in detail. Pt elected to complete POLST for DNR/DNI with selective treatment and no TFs.     Updated: MD, BS RN    Plan: DC to SNF    Recommendations: I do not recommend an ethics or hospice consult at this time because as pt wishes to continue with medical treatment at this. .    Thank you for allowing Palliative Care to participate in this patient's care. Please feel free to call [phone] with any questions or concerns.  "

## 2019-04-17 NOTE — CARE PLAN
Problem: Safety  Goal: Will remain free from falls  Outcome: PROGRESSING SLOWER THAN EXPECTED  Bed locked in lowest position and call light is within reach; patient calls appropriately.  Bed alarm is in place.

## 2019-04-18 LAB
ALBUMIN SERPL BCP-MCNC: 3.3 G/DL (ref 3.2–4.9)
ALBUMIN/GLOB SERPL: 1.2 G/DL
ALP SERPL-CCNC: 63 U/L (ref 30–99)
ALT SERPL-CCNC: 14 U/L (ref 2–50)
ANION GAP SERPL CALC-SCNC: 7 MMOL/L (ref 0–11.9)
ANISOCYTOSIS BLD QL SMEAR: ABNORMAL
AST SERPL-CCNC: 21 U/L (ref 12–45)
BASOPHILS # BLD AUTO: 0.5 % (ref 0–1.8)
BASOPHILS # BLD: 0.04 K/UL (ref 0–0.12)
BILIRUB SERPL-MCNC: 0.5 MG/DL (ref 0.1–1.5)
BUN SERPL-MCNC: 17 MG/DL (ref 8–22)
CALCIUM SERPL-MCNC: 8.2 MG/DL (ref 8.5–10.5)
CHLORIDE SERPL-SCNC: 104 MMOL/L (ref 96–112)
CO2 SERPL-SCNC: 30 MMOL/L (ref 20–33)
COMMENT 1642: NORMAL
CREAT SERPL-MCNC: 1.12 MG/DL (ref 0.5–1.4)
EOSINOPHIL # BLD AUTO: 0.14 K/UL (ref 0–0.51)
EOSINOPHIL NFR BLD: 1.7 % (ref 0–6.9)
ERYTHROCYTE [DISTWIDTH] IN BLOOD BY AUTOMATED COUNT: 64.3 FL (ref 35.9–50)
GLOBULIN SER CALC-MCNC: 2.7 G/DL (ref 1.9–3.5)
GLUCOSE SERPL-MCNC: 109 MG/DL (ref 65–99)
HCT VFR BLD AUTO: 31.5 % (ref 37–47)
HGB BLD-MCNC: 9.1 G/DL (ref 12–16)
HYPOCHROMIA BLD QL SMEAR: ABNORMAL
IMM GRANULOCYTES # BLD AUTO: 0.11 K/UL (ref 0–0.11)
IMM GRANULOCYTES NFR BLD AUTO: 1.3 % (ref 0–0.9)
LYMPHOCYTES # BLD AUTO: 0.62 K/UL (ref 1–4.8)
LYMPHOCYTES NFR BLD: 7.4 % (ref 22–41)
MCH RBC QN AUTO: 29.4 PG (ref 27–33)
MCHC RBC AUTO-ENTMCNC: 28.9 G/DL (ref 33.6–35)
MCV RBC AUTO: 101.9 FL (ref 81.4–97.8)
MICROCYTES BLD QL SMEAR: ABNORMAL
MONOCYTES # BLD AUTO: 0.58 K/UL (ref 0–0.85)
MONOCYTES NFR BLD AUTO: 6.9 % (ref 0–13.4)
MORPHOLOGY BLD-IMP: NORMAL
NEUTROPHILS # BLD AUTO: 6.86 K/UL (ref 2–7.15)
NEUTROPHILS NFR BLD: 82.2 % (ref 44–72)
NRBC # BLD AUTO: 0 K/UL
NRBC BLD-RTO: 0 /100 WBC
OVALOCYTES BLD QL SMEAR: NORMAL
PLATELET # BLD AUTO: 304 K/UL (ref 164–446)
PLATELET BLD QL SMEAR: NORMAL
PMV BLD AUTO: 9.1 FL (ref 9–12.9)
POIKILOCYTOSIS BLD QL SMEAR: NORMAL
POLYCHROMASIA BLD QL SMEAR: NORMAL
POTASSIUM SERPL-SCNC: 4.3 MMOL/L (ref 3.6–5.5)
PROT SERPL-MCNC: 6 G/DL (ref 6–8.2)
RBC # BLD AUTO: 3.09 M/UL (ref 4.2–5.4)
RBC BLD AUTO: PRESENT
SODIUM SERPL-SCNC: 141 MMOL/L (ref 135–145)
WBC # BLD AUTO: 8.4 K/UL (ref 4.8–10.8)

## 2019-04-18 PROCEDURE — 770020 HCHG ROOM/CARE - TELE (206)

## 2019-04-18 PROCEDURE — 700102 HCHG RX REV CODE 250 W/ 637 OVERRIDE(OP): Performed by: INTERNAL MEDICINE

## 2019-04-18 PROCEDURE — 99233 SBSQ HOSP IP/OBS HIGH 50: CPT | Performed by: INTERNAL MEDICINE

## 2019-04-18 PROCEDURE — 99233 SBSQ HOSP IP/OBS HIGH 50: CPT | Performed by: FAMILY MEDICINE

## 2019-04-18 PROCEDURE — 97530 THERAPEUTIC ACTIVITIES: CPT

## 2019-04-18 PROCEDURE — A9270 NON-COVERED ITEM OR SERVICE: HCPCS | Performed by: INTERNAL MEDICINE

## 2019-04-18 PROCEDURE — 85025 COMPLETE CBC W/AUTO DIFF WBC: CPT

## 2019-04-18 PROCEDURE — 97535 SELF CARE MNGMENT TRAINING: CPT

## 2019-04-18 PROCEDURE — A9270 NON-COVERED ITEM OR SERVICE: HCPCS | Performed by: HOSPITALIST

## 2019-04-18 PROCEDURE — 80053 COMPREHEN METABOLIC PANEL: CPT

## 2019-04-18 PROCEDURE — 700102 HCHG RX REV CODE 250 W/ 637 OVERRIDE(OP): Performed by: PHYSICIAN ASSISTANT

## 2019-04-18 PROCEDURE — 700102 HCHG RX REV CODE 250 W/ 637 OVERRIDE(OP): Performed by: HOSPITALIST

## 2019-04-18 PROCEDURE — A9270 NON-COVERED ITEM OR SERVICE: HCPCS | Performed by: PHYSICIAN ASSISTANT

## 2019-04-18 PROCEDURE — 93005 ELECTROCARDIOGRAM TRACING: CPT | Performed by: FAMILY MEDICINE

## 2019-04-18 PROCEDURE — 36415 COLL VENOUS BLD VENIPUNCTURE: CPT

## 2019-04-18 PROCEDURE — 93010 ELECTROCARDIOGRAM REPORT: CPT | Performed by: INTERNAL MEDICINE

## 2019-04-18 RX ORDER — METOPROLOL SUCCINATE 25 MG/1
37.5 TABLET, EXTENDED RELEASE ORAL 2 TIMES DAILY
Status: DISCONTINUED | OUTPATIENT
Start: 2019-04-18 | End: 2019-04-19 | Stop reason: HOSPADM

## 2019-04-18 RX ADMIN — METOPROLOL SUCCINATE 37.5 MG: 25 TABLET, EXTENDED RELEASE ORAL at 18:38

## 2019-04-18 RX ADMIN — BUDESONIDE AND FORMOTEROL FUMARATE DIHYDRATE 1 PUFF: 80; 4.5 AEROSOL RESPIRATORY (INHALATION) at 05:24

## 2019-04-18 RX ADMIN — BUPROPION HYDROCHLORIDE 200 MG: 100 TABLET, FILM COATED, EXTENDED RELEASE ORAL at 18:38

## 2019-04-18 RX ADMIN — LEVOTHYROXINE SODIUM 50 MCG: 50 TABLET ORAL at 05:24

## 2019-04-18 RX ADMIN — DIGOXIN 125 MCG: 125 TABLET ORAL at 18:41

## 2019-04-18 RX ADMIN — METOPROLOL SUCCINATE 37.5 MG: 25 TABLET, EXTENDED RELEASE ORAL at 09:19

## 2019-04-18 RX ADMIN — BUDESONIDE AND FORMOTEROL FUMARATE DIHYDRATE 1 PUFF: 80; 4.5 AEROSOL RESPIRATORY (INHALATION) at 18:41

## 2019-04-18 RX ADMIN — SENNOSIDES, DOCUSATE SODIUM 2 TABLET: 50; 8.6 TABLET, FILM COATED ORAL at 18:38

## 2019-04-18 RX ADMIN — AMOXICILLIN 1000 MG: 500 CAPSULE ORAL at 18:38

## 2019-04-18 RX ADMIN — BUPROPION HYDROCHLORIDE 200 MG: 100 TABLET, FILM COATED, EXTENDED RELEASE ORAL at 05:23

## 2019-04-18 RX ADMIN — FUROSEMIDE 40 MG: 40 TABLET ORAL at 05:23

## 2019-04-18 RX ADMIN — THERA TABS 1 TABLET: TAB at 05:24

## 2019-04-18 RX ADMIN — OMEPRAZOLE 40 MG: 20 CAPSULE, DELAYED RELEASE ORAL at 05:24

## 2019-04-18 RX ADMIN — POTASSIUM CHLORIDE 40 MEQ: 1500 TABLET, EXTENDED RELEASE ORAL at 05:24

## 2019-04-18 RX ADMIN — AMOXICILLIN 1000 MG: 500 CAPSULE ORAL at 05:24

## 2019-04-18 RX ADMIN — CLARITHROMYCIN 500 MG: 500 TABLET ORAL at 05:23

## 2019-04-18 RX ADMIN — CLARITHROMYCIN 500 MG: 500 TABLET ORAL at 18:38

## 2019-04-18 RX ADMIN — ATORVASTATIN CALCIUM 20 MG: 20 TABLET, FILM COATED ORAL at 20:37

## 2019-04-18 ASSESSMENT — ENCOUNTER SYMPTOMS
PALPITATIONS: 0
ABDOMINAL PAIN: 0
WEAKNESS: 1
FEVER: 0
CHILLS: 0
SHORTNESS OF BREATH: 0
ABDOMINAL DISTENTION: 0
DOUBLE VISION: 0
BLURRED VISION: 0
VOMITING: 0
HEADACHES: 0
NECK PAIN: 0
CHEST TIGHTNESS: 0
DEPRESSION: 0
WHEEZING: 0
ARTHRALGIAS: 1
DIZZINESS: 0
LIGHT-HEADEDNESS: 0
HEMOPTYSIS: 0
BRUISES/BLEEDS EASILY: 0
MYALGIAS: 0
COUGH: 0
TINGLING: 0
HEARTBURN: 0
NAUSEA: 0
SHORTNESS OF BREATH: 1

## 2019-04-18 ASSESSMENT — COGNITIVE AND FUNCTIONAL STATUS - GENERAL
DAILY ACTIVITIY SCORE: 17
DRESSING REGULAR UPPER BODY CLOTHING: A LITTLE
PERSONAL GROOMING: A LITTLE
TOILETING: A LITTLE
SUGGESTED CMS G CODE MODIFIER DAILY ACTIVITY: CK
DRESSING REGULAR LOWER BODY CLOTHING: A LOT
HELP NEEDED FOR BATHING: A LOT

## 2019-04-18 NOTE — PROGRESS NOTES
· 2 RN skin check complete with TAY Nguyen.  · Devices in place Nasal cannula, SCDs, Heel float boots.  · Skin assessed under devices red and blanching.  · Confirmed pressure ulcers found on N/A.  · New potential pressure ulcers noted on N/A  · Redness to pannus.  · The following interventions in place Interdry to pannus, heel float boots, Waffle cushion, turned every 2 hours, silicone nasal cannula, alan cream, barrier wipes, pillows to float heels and for support.

## 2019-04-18 NOTE — CARE PLAN
Problem: Safety  Goal: Will remain free from falls  Outcome: PROGRESSING SLOWER THAN EXPECTED  Bed locked in lowest position and call light is within reach; patient calls appropriately.  Bed alarm in place.

## 2019-04-18 NOTE — PROGRESS NOTES
Report received from TAY Oro. Patient A&Ox4.Plan of care reviewed with patient, denies any questions. No needs voiced at this time. Call light within reach, bed locked and in lowest position.

## 2019-04-18 NOTE — DISCHARGE PLANNING
Agency/Facility Name: [facility]  Spoke To: Marshall  Outcome: Patient set for transport for 4/19 at 1500 going to [facility].    JEREMÍAS Whitfield notified.

## 2019-04-18 NOTE — ASSESSMENT & PLAN NOTE
With ejection fraction of 25%.  Felt to be A. fib induced!  Holding some of the core measure medications including ACE inhibitors and Aldactone due to hypotension.  Diurese gently.  Strict I's and O's.  Daily weights.  Poor prognosis.  Palliative as discussed the prognosis with the patient who refused hospice at this point.

## 2019-04-18 NOTE — DISCHARGE PLANNING
Agency/Facility Name: [facility]  Spoke To: Admissions  Outcome: Left voicemail in regards to blood pressure. Awaiting return call.

## 2019-04-18 NOTE — DISCHARGE PLANNING
Anticipated Discharge Disposition: D/C to SNF    Action: LSW completed search for pt's brother, Lul Chapman, in an attempt to locate contact information, per request of pt. LSW was able to find that Lul is in [place] and his numbers are [phone]# [phone], ph# [phone].     LSW contacted Lul and spoke with his wife (pt's sister-in-law). It was confirmed that pt is their loved one and they were worried about her because they didn't realize she isn't in [place] anymore. LSW provided update regarding anticipated d/c plan of pt transferring to SNF. Lul and his wife will attempt to come to [facility] to see pt prior to her departure.    LSW met with pt at bedside and provided her with an update regarding her brother and sister-in-law.     Barriers to Discharge: None.    Plan: LSW to request transport once pt is medically clear for d/c to SNF.

## 2019-04-18 NOTE — DISCHARGE PLANNING
Agency/Facility Name: [facility]  Spoke To: Brianda  Outcome: Inquired about blood pressure levels, facility to have nursing review.

## 2019-04-18 NOTE — PROGRESS NOTES
Cardiology Follow Up Progress Note    Date of Service  4/18/2019    Attending Physician  Milan Wei M.D.    Chief Complaint   Afib with RVR, new Cardiomyopathy    HPI  Rhoda Gaines is a 78 y.o. female admitted 4/11/2019 with obesity, recent upper GI bleed, found to be in atrial fibrillation. Due to blood loss anemia, avoiding OAC.    Interim Events  no overnight events, blood pressures have improved.  Metoprolol has been held for 24 hours and rates are starting to climb.    Feels occasional palpitations but otherwise a symptom that, breathing is stable.    Review of Systems  Review of Systems   Constitutional: Negative for chills and fever.   Respiratory: Negative for cough, chest tightness, shortness of breath and wheezing.    Cardiovascular: Negative for chest pain, palpitations and leg swelling.   Gastrointestinal: Negative for abdominal distention and nausea.   Genitourinary:        Incontinence   Musculoskeletal: Positive for arthralgias.   Neurological: Negative for dizziness and light-headedness.       Vital signs in last 24 hours  Temp:  [35.9 °C (96.6 °F)-37 °C (98.6 °F)] 36.5 °C (97.7 °F)  Pulse:  [] 98  Resp:  [17-18] 17  BP: ()/(56-83) 104/60  SpO2:  [94 %-98 %] 96 %    Physical Exam  Physical Exam   Constitutional: She is oriented to person, place, and time. She appears well-developed and well-nourished. No distress.   Obese female, BMI 37 low muscle tone.  No acute distress.   HENT:   Head: Normocephalic and atraumatic.   Mouth/Throat: Oropharynx is clear and moist.   Eyes: No scleral icterus.   Neck: Neck supple. JVD present.   Cardiovascular: Normal rate.  An irregularly irregular rhythm present.   No murmur heard.  Pulses:       Radial pulses are 2+ on the right side, and 2+ on the left side.        Dorsalis pedis pulses are 2+ on the right side, and 2+ on the left side.   Pulmonary/Chest: Effort normal. No respiratory distress. She has no wheezes. She has rales (Bases).    Abdominal: Soft. She exhibits no distension. There is no tenderness.   Musculoskeletal: She exhibits edema (Nonpitting edema).   Neurological: She is alert and oriented to person, place, and time.   Skin: Skin is warm and dry. She is not diaphoretic.   Psychiatric: Judgment normal.   Nursing note and vitals reviewed.      Lab Review  Lab Results   Component Value Date/Time    WBC 9.0 04/17/2019 04:11 AM    RBC 2.89 (L) 04/17/2019 04:11 AM    HEMOGLOBIN 8.7 (L) 04/17/2019 04:11 AM    HEMATOCRIT 29.0 (L) 04/17/2019 04:11 AM    .3 (H) 04/17/2019 04:11 AM    MCH 30.1 04/17/2019 04:11 AM    MCHC 30.0 (L) 04/17/2019 04:11 AM    MPV 9.6 04/17/2019 04:11 AM      Lab Results   Component Value Date/Time    SODIUM 142 04/17/2019 12:17 AM    POTASSIUM 3.8 04/17/2019 12:17 AM    CHLORIDE 105 04/17/2019 12:17 AM    CO2 30 04/17/2019 12:17 AM    GLUCOSE 104 (H) 04/17/2019 12:17 AM    BUN 24 (H) 04/17/2019 12:17 AM    CREATININE 1.37 04/17/2019 12:17 AM      Lab Results   Component Value Date/Time    ASTSGOT 19 04/17/2019 12:17 AM    ALTSGPT 12 04/17/2019 12:17 AM     Lab Results   Component Value Date/Time    CHOLSTRLTOT 66 (L) 04/16/2019 02:14 AM    LDL 20 04/16/2019 02:14 AM    HDL 34 (A) 04/16/2019 02:14 AM    TRIGLYCERIDE 62 04/16/2019 02:14 AM    TROPONINI 0.97 (H) 04/12/2019 04:04 AM       Recent Labs      04/16/19   0214   BNPBTYPENAT  2082*       Cardiac Imaging and Procedures Review    Echocardiogram:  4/14/19  Severely reduced left ventricular systolic function.  Left ventricular ejection fraction is visually estimated to be 25%.  Global hypokinesis with regional variation.  Normal right ventricular size and systolic function.  Normal pericardium without effusion.     Assessment/Plan   Hypotension, asymptomatic, improving  - pressures have improved since washout of meds  -unable to tolerate GDMT (ACE/ARB/ARNI or Jorge Antag) for HF at this time, will need to titrate up as outpatient    Atrial fibrillation,  persistent  -rates a little higher overnight with holding metop  -start metop SR 37.5mg bid  - dig 125mcg daily  -No OAC in setting of GI bleed, will follow with GI outpatient for endoscopy  - not a candidate for DCCV as cannot tolerate OAC at this time     LV dysfunction with setting of new AF, ?tachycardia induced   -NYHA class III, difficult to assess due to inactivity   -Volume overloaded on exam, JVD and rales.  Need to continue diuresis as outpatient  - Lasix 40mg PO daily, potassium 40meq daily   - metoprolol SR 37.5mg BID  -  Dig 125mcg PO   - unable to tolerate low doses of ACE and Jorge antagonist due to hypotension, high risk for falls, will need to start these in the outpatient setting  - outpatient evaluation for ischemic work up  -Educated patient regarding the suspected etiology of her heart failure as well as ways to manage her symptoms including low-sodium diet, monitor daily weights and blood pressures, she understands she will be seen in the clinic after discharge for further medical management     Dyslipidemia, controlled  -Lipitor 20mg     GI bleed  - still having dark tarry stools per RN  -omeprazole 40mg  -Hb stable, no OAC for the time being  - needs GI follow up for endoscopy so Cardiology can discuss initiating OAC as outpatient     COPD/Emphysema  - Xopenex nebulizer PRN  -Supplemental O2 needs stable, 2.5 L       Thank you for allowing me to participate in the care of this patient.  At this time is patients appropriate for discharge, plan to diuresis outpatient follow closely with labs, will initiate guideline directed therapy as outpatient as well due to hypotension.    Cardiology will sign off on this patient  Staffed with Dr. Song

## 2019-04-18 NOTE — PROGRESS NOTES
Hospital Medicine Daily Progress Note    Date of Service  4/17/2019    Chief Complaint  Weakness, poor appetite, nausea, melena    Hospital Course    78 y.o. female with COPD, hypertension, hypothyroidism, atrial fibrillation on anticoagulation with Eliquis, admitted 4/11/2019 with weakness and poor appetite, nausea with retching but no vomiting, and report of persistent black stools and melena.  Initial outside hospital workup showed mildly elevated troponin of 0.7, with hemoglobin of 7.9, potassium of 6.5, and creatinine of 2.25.  Chest x-ray showed blunting of the right costophrenic angle with no consolidations, and moderate cardiomegaly.  Repeat labs here showed WBC of 12,100, and hemoglobin of 8.9, with potassium 6.2, and creatinine of 2.01.  CT of the abdomen showed ventral abdominal wall hernia containing loop of sigmoid colon with no evidence of obstruction.  She was felt to have GI bleed.  Her anticoagulation was held, and she started on PPI drip. GI was consulted.  She is also noted to be tachycardic, felt to be related to her worsening anemia.  She is started on volume expansion with IV fluids, and was given one-time dose of Cardizem.  She was given calcium gluconate, Lasix, D50 for her hyperkalemia.  Potassium improved.  Troponin peaked at 1.06.  Cardiology was consulted, who felt that the troponin elevation was demand ischemia.  Echocardiogram showed EF of 25%, with moderate MR, with no prior study available for comparison. She was cleared for endoscopy.  She underwent EGD, which showed normal esophagus, with 4 cm hiatal hernia, with gastritis in the antrum which was biopsied, with grossly normal duodenum. Tagged RBC scan showed no evidence of GI bleeding.  MoviPrep was positive.  She was transfused 1 unit of packed RBC for low Hgb with symptoms of tachycardia.  She was transitioned to oral PPI.  GI recommended holding her anticoagulation, and to do outpatient colonoscopy. her creatinine improved.  Digoxin level is normal.  PT/OT was ordered.           Interval Problem Update  4/15/2019 - I reviewed the patient's chart. There were no significant overnight events. Remains hemodynamically stable and afebrile. Stable on 2L O2 NC.  Her heart rate is better controlled.  Hemoglobin stable at 7.6.  Creatinine has improved to 1.12.  Magnesium is normal.  Sodium and potassium are normal.  Cardiology is transitioned her to oral beta-blocker.  Biopsy pathology of the gastric mucosa showed gastritis, and positive for H. Pylori. PT/OT recommending postacute facility prior to discharge to home.  4/16: Laying in bed comfortably.  Denies any chest pain.  No melena.  H&H has been stable.  Cardiology wants to keep the patient today.  I switched her to oral Lasix.  No distress noted.  4/17: Blood pressure dropped early morning.  She was given 500 cc of fluids which resulted in pulmonary edema.  ACE inhibitors and Aldactone has to be discontinued due to hypotension.  She denies any chest pain this morning.  No distress noted.  Consultants/Specialty  GI  Cardiology    Code Status  Full    Disposition  Monitor on telemetry.  SNF once medically cleared.    Review of Systems  Review of Systems   Constitutional: Positive for malaise/fatigue. Negative for chills and fever.   HENT: Negative for ear discharge, ear pain, hearing loss and tinnitus.    Eyes: Negative for blurred vision, double vision and photophobia.   Respiratory: Positive for shortness of breath. Negative for cough and hemoptysis.    Cardiovascular: Positive for leg swelling. Negative for chest pain, palpitations and orthopnea.   Gastrointestinal: Negative for abdominal pain, heartburn, nausea and vomiting.   Genitourinary: Negative for dysuria, frequency and urgency.   Musculoskeletal: Negative for back pain, myalgias and neck pain.   Skin: Negative for rash.   Neurological: Positive for weakness. Negative for dizziness, tingling, tremors and headaches.    Endo/Heme/Allergies: Does not bruise/bleed easily.   Psychiatric/Behavioral: Negative for depression, substance abuse and suicidal ideas.        Pertinent positives/negatives as mentioned above.     A complete review of systems was personally done by me. All other systems were negative.       Physical Exam  Temp:  [35.9 °C (96.6 °F)-36.9 °C (98.5 °F)] 35.9 °C (96.6 °F)  Pulse:  [65-99] 82  Resp:  [16-20] 18  BP: ()/(39-66) 98/58  SpO2:  [94 %-99 %] 95 %    Physical Exam   Constitutional: She is oriented to person, place, and time. No distress.   Obese. Body mass index is 38.88 kg/m².   HENT:   Head: Normocephalic and atraumatic.   Eyes: Pupils are equal, round, and reactive to light. Conjunctivae are normal. Right eye exhibits no discharge. Left eye exhibits no discharge. No scleral icterus.   Neck: Normal range of motion. Neck supple. No tracheal deviation present. No thyromegaly present.   Cardiovascular: Exam reveals no gallop and no friction rub.    Rate controlled, irregular rhythm.   Pulmonary/Chest: Effort normal. No respiratory distress. She has decreased breath sounds. She has no wheezes. She has rales in the right lower field and the left lower field.   Abdominal: Soft. She exhibits no distension. There is no tenderness.   Musculoskeletal: She exhibits edema (On LE's b/l ). She exhibits no tenderness or deformity.   Neurological: She is alert and oriented to person, place, and time. No cranial nerve deficit.   Skin: Skin is warm and dry. She is not diaphoretic. No erythema.   Psychiatric: She has a normal mood and affect. Her behavior is normal. Thought content normal.   Nursing note and vitals reviewed.      Fluids  No intake or output data in the 24 hours ending 04/17/19 1730    Laboratory  Recent Labs      04/16/19   0214  04/17/19   0017  04/17/19   0411   WBC  10.3  9.0  9.0   RBC  2.74*  2.89*  2.89*   HEMOGLOBIN  8.3*  8.7*  8.7*   HEMATOCRIT  27.8*  28.8*  29.0*   MCV  101.5*  99.7*  100.3*    MCH  30.3  30.1  30.1   MCHC  29.9*  30.2*  30.0*   RDW  68.8*  63.3*  64.4*   PLATELETCT  333  321  322   MPV  10.0  10.0  9.6     Recent Labs      04/15/19   0305  04/16/19   0214  04/17/19   0017   SODIUM  144  143  142   POTASSIUM  3.6  3.3*  3.8   CHLORIDE  111  105  105   CO2  26  30  30   GLUCOSE  97  84  104*   BUN  25*  22  24*   CREATININE  1.12  1.11  1.37   CALCIUM  7.5*  7.8*  7.7*         Recent Labs      04/16/19   0214   BNPBTYPENAT  2082*     Recent Labs      04/16/19 0214   TRIGLYCERIDE  62   HDL  34*   LDL  20       Imaging  DX-CHEST-PORTABLE (1 VIEW)   Final Result         1.  Pulmonary edema and/or infiltrates.   2.  Trace bilateral pleural effusions   3.  Cardiomegaly   4.  Atherosclerosis   5.  Hyperexpansion of lungs favors changes of COPD.      EC-ECHOCARDIOGRAM LTD W/O CONT   Final Result      NM-GI BLEEDING SCAN   Final Result      No evidence of gastrointestinal bleeding.      IR-MIDLINE CATHETER INSERTION WO GUIDANCE > AGE 3   Final Result                  Ultrasound-guided midline placement performed by qualified nursing staff    as above.          DX-CHEST-PORTABLE (1 VIEW)   Final Result         Diffuse interstitial prominence could relate to mild pulmonary edema.      Trace pleural effusions.      Cardiomegaly.      EC-ECHOCARDIOGRAM COMPLETE W/O CONT   Final Result      CT-ABDOMEN-PELVIS W/O   Final Result      1.  Ventral abdominal wall hernia containing loop of sigmoid colon with no evidence of obstruction.      2.  Right adrenal mass demonstrates characteristics consistent with adrenal adenoma.      3.  There is diverticulosis.      4.  Renal cysts are identified.      5.  Calcific atherosclerosis.           Assessment/Plan  Hypokalemia   Assessment & Plan    On replacement  Continue to monitor.     Acute blood loss anemia from GI bleed- (present on admission)   Assessment & Plan    -Hgb remains stable. Continue to monitor hemoglobin closely.  Recommend to maintain hemoglobin  above 8 mg/dL considering her cardiac history with severely reduced ejection fraction.     Hemorrhagic disorder due to extrinsic circulating anticoagulants (HCC)- (present on admission)   Assessment & Plan    -Continue to hold anticoagulants.  Management of GI bleed as above.     Elevated troponin- (present on admission)   Assessment & Plan    -Suspect this is type II NSTEMI due to demand ischemia from GI bleed, anemia, and tachycardia with RVR.  Troponin has trended down.  -Continue to hold antiplatelets and anticoagulants due to GI bleed.  -Cardiology on board, with further inputs. ?  Ischemic workup prior to discharge.  -Monitor on telemetry.     Acute respiratory failure with hypoxia (HCC)- (present on admission)   Assessment & Plan    Due to above.  Continue with gentle diuresis.  Wean off oxygen as tolerated.     Acute systolic congestive heart failure (HCC)   Assessment & Plan    With ejection fraction of 25%.  Felt to be A. fib induced!  Holding some of the core measure medications including ACE inhibitors and Aldactone due to hypotension.  Diurese gently.  Strict I's and O's.  Daily weights.  Poor prognosis.  Palliative as discussed the prognosis with the patient who refused hospice at this point.     Obesity- (present on admission)   Assessment & Plan    - Body mass index is 38.88 kg/m²..  - outpatient referral for outpatient weight management.     HLD (hyperlipidemia)- (present on admission)   Assessment & Plan    -Continue home dose Lipitor.     COPD (chronic obstructive pulmonary disease) (HCC)- (present on admission)   Assessment & Plan    -Not in acute exacerbation.  Continue home Symbicort, along with bronchodilators per RT protocol.     Hypothyroidism- (present on admission)   Assessment & Plan    -TSH is normal. Continue home dose Synthroid.     HTN (hypertension)- (present on admission)   Assessment & Plan    Patient now is hypotensive.  Lisinopril and Aldactone has been discontinued by  cardiology.  Try to avoid any fluids to improve blood pressure due to high risk for pulmonary edema and avoid Midodrine due to severely reduced ejection fraction.   Monitor for now.     Plan of care discussed with multidisciplinary team during rounds.    VTE prophylaxis: SCD.  Anticoagulation on hold due to GI bleed

## 2019-04-18 NOTE — PROGRESS NOTES
· 2 RN skin check complete with TAY Santos.  · Devices in place Nasal cannula, SCDs, Heel float boots.  · Skin assessed under devices red and blanching.  · Confirmed pressure ulcers found on N/A.  · New potential pressure ulcers noted on N/A  · Redness to pannus.  · The following interventions in place Interdry to pannus, heel float boots, Waffle cushion, turned every 2 hours, silicone nasal cannula, alan cream, barrier wipes, pillows to float heels and for support.

## 2019-04-18 NOTE — THERAPY
"Occupational Therapy Treatment completed with focus on ADLs and ADL transfers.  Functional Status:  SBA for supine>sit; min A for sit>Stand; CGA for toilet transfer; SPV for toileting; SPV for grooming while standing; SBA for sit>supine  Plan of Care: Will benefit from Occupational Therapy 3 times per week  Discharge Recommendations:  Equipment Will Continue to Assess for Equipment Needs. Recommend inpatient transitional care services for continued occupational therapy services.     See \"Rehab Therapy-Acute\" Patient Summary Report for complete documentation.     Pt participated in OT tx session. Pt very motivated and cooperative for mobilization. Pt limited due to poor standing balance, standing tolerance, activity tolerance and generalized weakness. Pt with heavy BUE reliance during standing grooming tasks and functional transfers. Will continue to follow for acute OT services while in-house for continued strengthening.   "

## 2019-04-19 VITALS
BODY MASS INDEX: 38.22 KG/M2 | WEIGHT: 194.67 LBS | DIASTOLIC BLOOD PRESSURE: 53 MMHG | TEMPERATURE: 97.9 F | OXYGEN SATURATION: 100 % | HEART RATE: 81 BPM | RESPIRATION RATE: 16 BRPM | HEIGHT: 60 IN | SYSTOLIC BLOOD PRESSURE: 116 MMHG

## 2019-04-19 LAB
BASOPHILS # BLD AUTO: 0.5 % (ref 0–1.8)
BASOPHILS # BLD: 0.04 K/UL (ref 0–0.12)
EKG IMPRESSION: NORMAL
EOSINOPHIL # BLD AUTO: 0.17 K/UL (ref 0–0.51)
EOSINOPHIL NFR BLD: 2 % (ref 0–6.9)
ERYTHROCYTE [DISTWIDTH] IN BLOOD BY AUTOMATED COUNT: 61.8 FL (ref 35.9–50)
HCT VFR BLD AUTO: 28.5 % (ref 37–47)
HGB BLD-MCNC: 8.5 G/DL (ref 12–16)
IMM GRANULOCYTES # BLD AUTO: 0.14 K/UL (ref 0–0.11)
IMM GRANULOCYTES NFR BLD AUTO: 1.6 % (ref 0–0.9)
LYMPHOCYTES # BLD AUTO: 0.93 K/UL (ref 1–4.8)
LYMPHOCYTES NFR BLD: 10.8 % (ref 22–41)
MCH RBC QN AUTO: 29.6 PG (ref 27–33)
MCHC RBC AUTO-ENTMCNC: 29.8 G/DL (ref 33.6–35)
MCV RBC AUTO: 99.3 FL (ref 81.4–97.8)
MONOCYTES # BLD AUTO: 0.68 K/UL (ref 0–0.85)
MONOCYTES NFR BLD AUTO: 7.9 % (ref 0–13.4)
NEUTROPHILS # BLD AUTO: 6.69 K/UL (ref 2–7.15)
NEUTROPHILS NFR BLD: 77.2 % (ref 44–72)
NRBC # BLD AUTO: 0 K/UL
NRBC BLD-RTO: 0 /100 WBC
PLATELET # BLD AUTO: 297 K/UL (ref 164–446)
PMV BLD AUTO: 9.5 FL (ref 9–12.9)
RBC # BLD AUTO: 2.87 M/UL (ref 4.2–5.4)
WBC # BLD AUTO: 8.7 K/UL (ref 4.8–10.8)

## 2019-04-19 PROCEDURE — 85025 COMPLETE CBC W/AUTO DIFF WBC: CPT

## 2019-04-19 PROCEDURE — 99239 HOSP IP/OBS DSCHRG MGMT >30: CPT | Performed by: FAMILY MEDICINE

## 2019-04-19 PROCEDURE — 700102 HCHG RX REV CODE 250 W/ 637 OVERRIDE(OP): Performed by: PHYSICIAN ASSISTANT

## 2019-04-19 PROCEDURE — 700102 HCHG RX REV CODE 250 W/ 637 OVERRIDE(OP): Performed by: HOSPITALIST

## 2019-04-19 PROCEDURE — A9270 NON-COVERED ITEM OR SERVICE: HCPCS | Performed by: PHYSICIAN ASSISTANT

## 2019-04-19 PROCEDURE — 700102 HCHG RX REV CODE 250 W/ 637 OVERRIDE(OP): Performed by: INTERNAL MEDICINE

## 2019-04-19 PROCEDURE — 36415 COLL VENOUS BLD VENIPUNCTURE: CPT

## 2019-04-19 PROCEDURE — A9270 NON-COVERED ITEM OR SERVICE: HCPCS | Performed by: INTERNAL MEDICINE

## 2019-04-19 PROCEDURE — A9270 NON-COVERED ITEM OR SERVICE: HCPCS | Performed by: HOSPITALIST

## 2019-04-19 RX ORDER — METOPROLOL SUCCINATE 25 MG/1
37.5 TABLET, EXTENDED RELEASE ORAL 2 TIMES DAILY
Qty: 30 TAB | Refills: 0 | Status: SHIPPED | OUTPATIENT
Start: 2019-04-19 | End: 2019-05-07

## 2019-04-19 RX ORDER — FUROSEMIDE 40 MG/1
40 TABLET ORAL DAILY
Qty: 30 TAB | Refills: 0 | Status: SHIPPED | OUTPATIENT
Start: 2019-04-20

## 2019-04-19 RX ADMIN — BUPROPION HYDROCHLORIDE 200 MG: 100 TABLET, FILM COATED, EXTENDED RELEASE ORAL at 05:12

## 2019-04-19 RX ADMIN — SENNOSIDES, DOCUSATE SODIUM 2 TABLET: 50; 8.6 TABLET, FILM COATED ORAL at 05:13

## 2019-04-19 RX ADMIN — BUDESONIDE AND FORMOTEROL FUMARATE DIHYDRATE 1 PUFF: 80; 4.5 AEROSOL RESPIRATORY (INHALATION) at 05:12

## 2019-04-19 RX ADMIN — AMOXICILLIN 1000 MG: 500 CAPSULE ORAL at 05:12

## 2019-04-19 RX ADMIN — METOPROLOL SUCCINATE 37.5 MG: 25 TABLET, EXTENDED RELEASE ORAL at 05:13

## 2019-04-19 RX ADMIN — FUROSEMIDE 40 MG: 40 TABLET ORAL at 05:13

## 2019-04-19 RX ADMIN — CLARITHROMYCIN 500 MG: 500 TABLET ORAL at 05:13

## 2019-04-19 RX ADMIN — LEVOTHYROXINE SODIUM 50 MCG: 50 TABLET ORAL at 05:13

## 2019-04-19 RX ADMIN — THERA TABS 1 TABLET: TAB at 05:13

## 2019-04-19 RX ADMIN — OMEPRAZOLE 40 MG: 20 CAPSULE, DELAYED RELEASE ORAL at 05:13

## 2019-04-19 RX ADMIN — POTASSIUM CHLORIDE 40 MEQ: 1500 TABLET, EXTENDED RELEASE ORAL at 05:13

## 2019-04-19 NOTE — PROGRESS NOTES
Hospital Medicine Daily Progress Note    Date of Service  4/18/2019    Chief Complaint  Weakness, poor appetite, nausea, melena    Hospital Course    78 y.o. female with COPD, hypertension, hypothyroidism, atrial fibrillation on anticoagulation with Eliquis, admitted 4/11/2019 with weakness and poor appetite, nausea with retching but no vomiting, and report of persistent black stools and melena.  Initial outside hospital workup showed mildly elevated troponin of 0.7, with hemoglobin of 7.9, potassium of 6.5, and creatinine of 2.25.  Chest x-ray showed blunting of the right costophrenic angle with no consolidations, and moderate cardiomegaly.  Repeat labs here showed WBC of 12,100, and hemoglobin of 8.9, with potassium 6.2, and creatinine of 2.01.  CT of the abdomen showed ventral abdominal wall hernia containing loop of sigmoid colon with no evidence of obstruction.  She was felt to have GI bleed.  Her anticoagulation was held, and she started on PPI drip. GI was consulted.  She is also noted to be tachycardic, felt to be related to her worsening anemia.  She is started on volume expansion with IV fluids, and was given one-time dose of Cardizem.  She was given calcium gluconate, Lasix, D50 for her hyperkalemia.  Potassium improved.  Troponin peaked at 1.06.  Cardiology was consulted, who felt that the troponin elevation was demand ischemia.  Echocardiogram showed EF of 25%, with moderate MR, with no prior study available for comparison. She was cleared for endoscopy.  She underwent EGD, which showed normal esophagus, with 4 cm hiatal hernia, with gastritis in the antrum which was biopsied, with grossly normal duodenum. Tagged RBC scan showed no evidence of GI bleeding.  MoviPrep was positive.  She was transfused 1 unit of packed RBC for low Hgb with symptoms of tachycardia.  She was transitioned to oral PPI.  GI recommended holding her anticoagulation, and to do outpatient colonoscopy. her creatinine improved.  Digoxin level is normal.  PT/OT was ordered.           Interval Problem Update  4/15/2019 - I reviewed the patient's chart. There were no significant overnight events. Remains hemodynamically stable and afebrile. Stable on 2L O2 NC.  Her heart rate is better controlled.  Hemoglobin stable at 7.6.  Creatinine has improved to 1.12.  Magnesium is normal.  Sodium and potassium are normal.  Cardiology is transitioned her to oral beta-blocker.  Biopsy pathology of the gastric mucosa showed gastritis, and positive for H. Pylori. PT/OT recommending postacute facility prior to discharge to home.  4/16: Laying in bed comfortably.  Denies any chest pain.  No melena.  H&H has been stable.  Cardiology wants to keep the patient today.  I switched her to oral Lasix.  No distress noted.  4/17: Blood pressure dropped early morning.  She was given 500 cc of fluids which resulted in pulmonary edema.  ACE inhibitors and Aldactone has to be discontinued due to hypotension.  She denies any chest pain this morning.  No distress noted.  4/18: Blood pressure has been stable but on the low side.  Denies any dizziness or lightheadedness.  Denies any chest pain or worsening shortness of breath.  Hemoglobin slightly trended up.  No distress noted no issues overnight per staff.   to consult with SNF staff to see if they will accept the patient with borderline blood pressure.  Per cardiology blood pressure will not improve more than this.  Still off ACE inhibitors and Aldactone and beta-blocker was cut in half.  Consultants/Specialty  GI  Cardiology    Code Status  Full    Disposition  Monitor on telemetry.  SNF once medically cleared.    Review of Systems  Review of Systems   Constitutional: Positive for malaise/fatigue. Negative for chills and fever.   HENT: Negative for ear pain, hearing loss and tinnitus.    Eyes: Negative for blurred vision and double vision.   Respiratory: Positive for shortness of breath. Negative for cough  and hemoptysis.    Cardiovascular: Positive for leg swelling. Negative for chest pain and palpitations.   Gastrointestinal: Negative for abdominal pain, heartburn, nausea and vomiting.   Genitourinary: Negative for dysuria and urgency.   Musculoskeletal: Negative for myalgias and neck pain.   Skin: Negative for rash.   Neurological: Positive for weakness. Negative for dizziness, tingling and headaches.   Endo/Heme/Allergies: Does not bruise/bleed easily.   Psychiatric/Behavioral: Negative for depression and suicidal ideas.        Pertinent positives/negatives as mentioned above.     A complete review of systems was personally done by me. All other systems were negative.       Physical Exam  Temp:  [35.8 °C (96.5 °F)-37 °C (98.6 °F)] 35.8 °C (96.5 °F)  Pulse:  [] 81  Resp:  [17-18] 17  BP: ()/(50-83) 98/50  SpO2:  [96 %-100 %] 100 %    Physical Exam   Constitutional: She is oriented to person, place, and time. No distress.   Obese. Body mass index is 38.88 kg/m².   HENT:   Head: Normocephalic and atraumatic.   Eyes: Pupils are equal, round, and reactive to light. Conjunctivae are normal. Right eye exhibits no discharge. Left eye exhibits no discharge. No scleral icterus.   Neck: Normal range of motion. Neck supple. No tracheal deviation present. No thyromegaly present.   Cardiovascular: Normal rate and regular rhythm.  Exam reveals no gallop and no friction rub.    Rate controlled, irregular rhythm.   Pulmonary/Chest: Effort normal. No respiratory distress. She has decreased breath sounds. She has no wheezes. She has rales in the right lower field and the left lower field.   Abdominal: Soft. Bowel sounds are normal. She exhibits no distension. There is no tenderness. There is no rebound.   Musculoskeletal: She exhibits edema (On LE's b/l ). She exhibits no tenderness or deformity.   Neurological: She is alert and oriented to person, place, and time. No cranial nerve deficit.   Skin: Skin is warm and dry.  She is not diaphoretic. No erythema.   Psychiatric: She has a normal mood and affect. Her behavior is normal. Judgment and thought content normal.   Nursing note and vitals reviewed.      Fluids  No intake or output data in the 24 hours ending 04/1939    Laboratory  Recent Labs      04/17/19   0017  04/17/19   0411  04/18/19   1002   WBC  9.0  9.0  8.4   RBC  2.89*  2.89*  3.09*   HEMOGLOBIN  8.7*  8.7*  9.1*   HEMATOCRIT  28.8*  29.0*  31.5*   MCV  99.7*  100.3*  101.9*   MCH  30.1  30.1  29.4   MCHC  30.2*  30.0*  28.9*   RDW  63.3*  64.4*  64.3*   PLATELETCT  321  322  304   MPV  10.0  9.6  9.1     Recent Labs      04/16/19   0214  04/17/19   0017  04/18/19   1002   SODIUM  143  142  141   POTASSIUM  3.3*  3.8  4.3   CHLORIDE  105  105  104   CO2  30  30  30   GLUCOSE  84  104*  109*   BUN  22  24*  17   CREATININE  1.11  1.37  1.12   CALCIUM  7.8*  7.7*  8.2*         Recent Labs      04/16/19   0214   BNPBTYPENAT  2082*     Recent Labs      04/16/19 0214   TRIGLYCERIDE  62   HDL  34*   LDL  20       Imaging  DX-CHEST-PORTABLE (1 VIEW)   Final Result         1.  Pulmonary edema and/or infiltrates.   2.  Trace bilateral pleural effusions   3.  Cardiomegaly   4.  Atherosclerosis   5.  Hyperexpansion of lungs favors changes of COPD.      EC-ECHOCARDIOGRAM LTD W/O CONT   Final Result      NM-GI BLEEDING SCAN   Final Result      No evidence of gastrointestinal bleeding.      IR-MIDLINE CATHETER INSERTION WO GUIDANCE > AGE 3   Final Result                  Ultrasound-guided midline placement performed by qualified nursing staff    as above.          DX-CHEST-PORTABLE (1 VIEW)   Final Result         Diffuse interstitial prominence could relate to mild pulmonary edema.      Trace pleural effusions.      Cardiomegaly.      EC-ECHOCARDIOGRAM COMPLETE W/O CONT   Final Result      CT-ABDOMEN-PELVIS W/O   Final Result      1.  Ventral abdominal wall hernia containing loop of sigmoid colon with no evidence of  obstruction.      2.  Right adrenal mass demonstrates characteristics consistent with adrenal adenoma.      3.  There is diverticulosis.      4.  Renal cysts are identified.      5.  Calcific atherosclerosis.           Assessment/Plan  Hypokalemia   Assessment & Plan    On replacement  Continue to monitor.     Acute blood loss anemia from GI bleed- (present on admission)   Assessment & Plan    -Hgb remains stable. Continue to monitor hemoglobin closely.  Recommend to maintain hemoglobin above 8 mg/dL considering her cardiac history with severely reduced ejection fraction.  4/18: Hemoglobin stable and slightly trended up.  Monitor for now.     Hemorrhagic disorder due to extrinsic circulating anticoagulants (HCC)- (present on admission)   Assessment & Plan    -Continue to hold anticoagulants.  Management of GI bleed as above.     Elevated troponin- (present on admission)   Assessment & Plan    -Suspect this is type II NSTEMI due to demand ischemia from GI bleed, anemia, and tachycardia with RVR.  Troponin has trended down.  -Continue to hold antiplatelets and anticoagulants due to GI bleed.  -Cardiology on board, with further inputs. ?  Ischemic workup prior to discharge.  -Monitor on telemetry.     Acute respiratory failure with hypoxia (HCC)- (present on admission)   Assessment & Plan    Due to above.  Continue with gentle diuresis.  Wean off oxygen as tolerated.     Acute systolic congestive heart failure (HCC)   Assessment & Plan    With ejection fraction of 25%.  Felt to be A. fib induced!  Holding some of the core measure medications including ACE inhibitors and Aldactone due to hypotension.  Diurese gently.  Strict I's and O's.  Daily weights.  Poor prognosis.  Palliative as discussed the prognosis with the patient who refused hospice at this point.     Obesity- (present on admission)   Assessment & Plan    - Body mass index is 38.88 kg/m²..  - outpatient referral for outpatient weight management.     D  (hyperlipidemia)- (present on admission)   Assessment & Plan    -Continue home dose Lipitor.     COPD (chronic obstructive pulmonary disease) (HCC)- (present on admission)   Assessment & Plan    -Not in acute exacerbation.  Continue home Symbicort, along with bronchodilators per RT protocol.     Hypothyroidism- (present on admission)   Assessment & Plan    -TSH is normal. Continue home dose Synthroid.     HTN (hypertension)- (present on admission)   Assessment & Plan    Patient now is hypotensive.  Lisinopril and Aldactone has been discontinued by cardiology.  Try to avoid any fluids to improve blood pressure due to high risk for pulmonary edema and avoid Midodrine due to severely reduced ejection fraction.   Monitor for now.     Plan of care discussed with multidisciplinary team during rounds.    VTE prophylaxis: SCD.  Anticoagulation on hold due to GI bleed

## 2019-04-19 NOTE — DISCHARGE PLANNING
Anticipated Discharge Disposition: D/C to SNF    Action: LSW met with pt at bedside to discuss plan for d/c to [facility] today at 1500. Pt is agreeable to plan, the only thing she needs is clothing. Pt reported her brother and sister-in-law came to visit her and they are also aware of plan for her to go to SNF.    LSW called pt's brother and notified him of pt's transfer. Pt's brother was provided with the address, phone number, and name of the facility.     Barriers to Discharge: None.    Plan: LSW to obtain clothing for pt from donation closet, COBRA and transfer packet will be completed within 2 hours of d/c.

## 2019-04-19 NOTE — DISCHARGE SUMMARY
Discharge Summary    CHIEF COMPLAINT ON ADMISSION  Chief Complaint   Patient presents with   • Weakness   • GI Problem       Reason for Admission  EMS     CODE STATUS  DNAR/DNI    HPI & HOSPITAL COURSE  This is a 78 y.o. Female with past medical history of A. fib on Eliquis, history of COPD with chronic respiratory failure on home oxygen, and history of hypothyroidism comes in with melena.  Apparently patient was recently diagnosed with congestive heart failure and A. fib and was seen in ER [facility] where she was discharged on anticoagulation.  After that patient started to notice dark stools but at the same time she was also on iron supplements.  Her hemoglobin baseline was around 9 and at the other facility prior to transfer was 7.9 mg/dL.  Her stool occult test was positive.  She was transferred here for further evaluation for GI bleed.  GI consulted.  EGD was done which showed 4 cm hiatal hernia and gastritis in the antrum.  The esophagus was grossly normal.  No fresh blood or clots seen on EGD.  Her H. pylori antigen came back positive.  Started on Biaxin and amoxicillin.  Eliquis has been held.  She was transfused upon admission.  Her H&H has been stable improve over hospital course.  She also noted to have tachycardia.  Initially felt to be due to worsening her anemia.  She was given IV fluids for volume expansion.  Her troponin peaked to 1.06.  Cardiology consulted.  Echocardiogram was done showed ejection fraction of 25% with moderate mitral regurgitation.  GI recommended colonoscopy but patient refused.  She was advised to have the colonoscopy done as an outpatient.  Cardiology optimize her cardiac medications.  They recommended cardiac ischemia workup as an outpatient.  She also had acute kidney injury which improved over hospital course.  Her heart rate control medications were adjusted and her rate was better controlled.  Her electrolyte imbalance was addressed during this hospital stay.  Physical therapy  and occupational therapy assessed the patient and recommended SNF placement.  Referral was sent and patient was accepted.  On the day of discharge, her H&H has been improved.  No more melena.  She denies any chest pain.  Her CODE STATUS was discussed and she wanted her CODE STATUS to be DNR/DNI.  That was noted in the medical records.  She was hemodynamically and clinically stable.  She was cleared for discharge from medical standpoint.  Addendum: Cardiology wanted to keep the patient did diurese her better.  However, she was not tolerating her cardiac medications and her blood pressure was soft.  Cardiology has to this to stop Aldactone and ACE inhibitors.  Blood pressure was still borderline.  Cardiology felt that there is not much can be done and patient is stable at this point despite that her blood pressure is soft.  The night before the day of discharge patient noted to have ST depression on 2 leads on telemetry monitoring but she was chest pain-free.  That was discussed with cardiology who felt it was not concerning and recommended to continue the current management.  They cleared patient for discharge.  Referral was sent back to SNF and patient was accepted.         Therefore, she is discharged in guarded and stable condition to skilled nursing facility.    The patient met 2-midnight criteria for an inpatient stay at the time of discharge.      FOLLOW UP ITEMS POST DISCHARGE  Follow-up with PCP at SNF as per recommendations.  Follow-up with GI as per recommendations.  Follow-up with cardiology as per recommendations.    DISCHARGE DIAGNOSES  Principal Problem (Resolved):    Acute upper GI bleeding POA: Yes  Active Problems:    Elevated troponin POA: Yes    Hemorrhagic disorder due to extrinsic circulating anticoagulants (HCC) POA: Yes    Acute blood loss anemia from GI bleed POA: Yes    Hypokalemia POA: No    HTN (hypertension) POA: Yes    Hypothyroidism POA: Yes    COPD (chronic obstructive pulmonary  disease) ([facility]) POA: Yes    HLD (hyperlipidemia) POA: Yes    Obesity POA: Yes    Acute systolic congestive heart failure (HCC) POA: Unknown    Acute respiratory failure with hypoxia ([facility]) POA: Yes  Resolved Problems:    Atrial fibrillation with rapid ventricular response ([facility]) POA: Yes    SHYAM (acute kidney injury) ([facility]) POA: Yes    Hyperkalemia POA: Yes    Hypernatremia POA: No      FOLLOW UP  No future appointments.  No follow-up provider specified.    MEDICATIONS ON DISCHARGE     Medication List      START taking these medications      Instructions   amoxicillin 500 MG Caps  Commonly known as:  AMOXIL   Take 2 Caps by mouth every 12 hours for 12 days.  Dose:  1000 mg     clarithromycin 500 MG Tabs  Commonly known as:  BIAXIN   Take 1 Tab by mouth every 12 hours for 12 days.  Dose:  500 mg     digoxin 125 MCG Tabs  Commonly known as:  LANOXIN   Take 1 Tab by mouth every day at 6 PM.  Dose:  125 mcg     metoprolol SR 25 MG Tb24  Commonly known as:  TOPROL XL   Take 1.5 Tabs by mouth 2 Times a Day.  Dose:  37.5 mg     multivitamin Tabs   Take 1 Tab by mouth every day.  Dose:  1 Tab        CHANGE how you take these medications      Instructions   furosemide 40 MG Tabs  Start taking on:  4/20/2019  What changed:  · medication strength  · how much to take  · when to take this  · reasons to take this  Commonly known as:  LASIX   Take 1 Tab by mouth every day.  Dose:  40 mg     potassium chloride SA 20 MEQ Tbcr  What changed:  when to take this  Commonly known as:  Kdur   Take 2 Tabs by mouth every day.  Dose:  40 mEq        CONTINUE taking these medications      Instructions   albuterol 108 (90 Base) MCG/ACT Aers inhalation aerosol   Inhale 2 Puffs by mouth every 6 hours as needed for Shortness of Breath.  Dose:  2 Puff     atorvastatin 20 MG Tabs  Commonly known as:  LIPITOR   Take 20 mg by mouth every evening.  Dose:  20 mg     budesonide-formoterol 80-4.5 MCG/ACT Aero  Commonly known as:  SYMBICORT   Inhale 1 Puff by  mouth 2 Times a Day.  Dose:  1 Puff     docusate sodium 100 MG Caps  Commonly known as:  COLACE   Take 100 mg by mouth 2 times a day as needed for Constipation.  Dose:  100 mg     ipratropium-albuterol 0.5-2.5 (3) MG/3ML nebulizer solution  Commonly known as:  DUONEB   3 mL by Nebulization route 4 times a day.  Dose:  3 mL     levothyroxine 50 MCG Tabs  Commonly known as:  SYNTHROID   Take 50 mcg by mouth Every morning on an empty stomach.  Dose:  50 mcg     omeprazole 20 MG delayed-release capsule  Commonly known as:  PRILOSEC   Take 20 mg by mouth every day.  Dose:  20 mg     polyethylene glycol/lytes Pack  Commonly known as:  MIRALAX   Take 17 g by mouth 1 time daily as needed (Constipation).  Dose:  17 g     WELLBUTRIN  MG SR tablet  Generic drug:  buPROPion   Take 200 mg by mouth 2 times a day.  Dose:  200 mg        STOP taking these medications    aspirin EC 81 MG Tbec  Commonly known as:  ECOTRIN     diclofenac EC 50 MG Tbec  Commonly known as:  VOLTAREN     DILTIAZem 60 MG Tabs  Commonly known as:  CARDIZEM     ELIQUIS 5mg Tabs  Generic drug:  apixaban     hydroCHLOROthiazide 25 MG Tabs  Commonly known as:  HYDRODIURIL     lisinopril 2.5 MG Tabs  Commonly known as:  PRINIVIL     magnesium oxide 400 MG Tabs  Commonly known as:  MAG-OX     raNITidine 150 MG Tabs  Commonly known as:  ZANTAC            Allergies  No Known Allergies    DIET  Orders Placed This Encounter   Procedures   • Diet Order Low Fiber(GI Soft), Cardiac     Standing Status:   Standing     Number of Occurrences:   1     Order Specific Question:   Diet:     Answer:   Low Fiber(GI Soft) [2]     Order Specific Question:   Diet:     Answer:   Cardiac [6]       ACTIVITY  As tolerated.  Weight bearing as tolerated    LINES, DRAINS, AND WOUNDS  This is an automated list. Peripheral IVs will be removed prior to discharge.  Midline IV 04/13/19 Right Upper arm (Active)   Site Assessment Clean;Dry;Intact 4/16/2019  8:10 AM   Dressing Type  Biopatch;Occlusive;Transparent 4/16/2019  8:10 AM   Line Status Saline locked 4/16/2019  8:10 AM   Dressing Status Clean;Dry;Intact 4/16/2019  8:10 AM   Dressing Intervention N/A 4/16/2019  8:10 AM   Dressing Change Due 04/20/19 4/14/2019  7:54 PM   Date Primary Tubing Changed 04/13/19 4/14/2019  7:54 PM   NEXT Primary Tubing Change  04/16/19 4/14/2019  7:54 PM   Infiltration Grading ([facility], [facility]) 0 4/16/2019  8:10 AM   [facility]bitis [facility] 0 4/16/2019  8:10 AM                     MENTAL STATUS ON TRANSFER  Level of Consciousness: Alert  Orientation : Oriented x 4  Speech: Speech Clear    CONSULTATIONS  GI  Cardiology    PROCEDURES  EGD as mentioned above    LABORATORY  Lab Results   Component Value Date    SODIUM 141 04/18/2019    POTASSIUM 4.3 04/18/2019    CHLORIDE 104 04/18/2019    CO2 30 04/18/2019    GLUCOSE 109 (H) 04/18/2019    BUN 17 04/18/2019    CREATININE 1.12 04/18/2019        Lab Results   Component Value Date    WBC 8.7 04/19/2019    HEMOGLOBIN 8.5 (L) 04/19/2019    HEMATOCRIT 28.5 (L) 04/19/2019    PLATELETCT 297 04/19/2019        Total time of the discharge process exceeds 40 minutes.

## 2019-04-19 NOTE — DISCHARGE INSTRUCTIONS
Discharge Instructions    Discharged to other by medical transportation with escort. Discharged via wheelchair, hospital escort: Yes.  Special equipment needed: Oxygen and Wheelchair    Be sure to schedule a follow-up appointment with your primary care doctor or any specialists as instructed.     Discharge Plan:   Diet Plan: Discussed  Activity Level: Discussed  Confirmed Follow up Appointment: Appointment Scheduled  Confirmed Symptoms Management: Discussed  Medication Reconciliation Updated: Yes  Influenza Vaccine Indication: Not indicated: Previously immunized this influenza season and > 8 years of age    I understand that a diet low in cholesterol, fat, and sodium is recommended for good health. Unless I have been given specific instructions below for another diet, I accept this instruction as my diet prescription.   Other diet: CARDIAC      Special Instructions:   HF Patient Discharge Instructions  · Monitor your weight daily, and maintain a weight chart, to track your weight changes.   · Activity as tolerated, unless your Doctor has ordered otherwise. Other activity order: As per rehab.  · Follow a low fat, low cholesterol, low salt diet unless instructed otherwise by your Doctor. Read the labels on the back of food products and track your intake of fat, cholesterol and salt.   · Fluid Restriction No. If a Fluid Restriction has been ordered by your Doctor, measure fluids with a measuring cup to ensure that you are not exceeding the restriction.   · No smoking.  · Oxygen Yes. If your Doctor has ordered that you wear Oxygen at home, it is important to wear it as ordered.  · Did you receive an explanation from staff on the importance of taking each of your medications and why it is necessary to keep taking them unless your doctor says to stop? No, [facility] patient  · Were all of your questions answered about how to manage your heart failure and what to do if you have increased signs and symptoms after you go home?  Yes  · Do you feel like your heart failure care team involved you in the care treatment plan and allowed you to make decisions regarding your care while in the hospital and addressed any discharge needs you might have? Yes    See the educational handout provided at discharge for more information on monitoring your daily weight, activity and diet. This also explains more about Heart Failure, symptoms of a flare-up and some of the tests that you have undergone.     Warning Signs of a Flare-Up include:  · Swelling in the ankles or lower legs.  · Shortness of breath, while at rest, or while doing normal activities.   · Shortness of breath at night when in bed, or coughing in bed.   · Requiring more pillows to sleep at night, or needing to sit up at night to sleep.  · Feeling weak, dizzy or fatigued.     When to call your Doctor:  · Call [facility] seven days a week from 8:00 a.m. to 8:00 p.m. for medical questions [phone].  · Call your Primary Care Physician or Cardiologist if:   1. You experience any pain radiating to your jaw or neck.  2. You have any difficulty breathing.  3. You experience weight gain of 3 lbs in a day or 5 lbs in a week.   4. You feel any palpitations or irregular heartbeats.  5. You become dizzy or lose consciousness.   If you have had an angiogram or had a pacemaker or AICD placed, and experience:  1. Bleeding, drainage or swelling at the surgical / puncture site.  2. Fever greater than 100.0 F  3. Shock from internal defibrillator.  4. Cool and / or numb extremities.      · Is patient discharged on Warfarin / Coumadin?   No     Depression / Suicide Risk    As you are discharged from this [facility], it is important to learn how to keep safe from harming yourself.    Recognize the warning signs:  · Abrupt changes in personality, positive or negative- including increase in energy   · Giving away possessions  · Change in eating patterns- significant weight changes-   positive or negative  · Change in sleeping patterns- unable to sleep or sleeping all the time   · Unwillingness or inability to communicate  · Depression  · Unusual sadness, discouragement and loneliness  · Talk of wanting to die  · Neglect of personal appearance   · Rebelliousness- reckless behavior  · Withdrawal from people/activities they love  · Confusion- inability to concentrate     If you or a loved one observes any of these behaviors or has concerns about self-harm, here's what you can do:  · Talk about it- your feelings and reasons for harming yourself  · Remove any means that you might use to hurt yourself (examples: pills, rope, extension cords, firearm)  · Get professional help from the community (Mental Health, Substance Abuse, psychological counseling)  · Do not be alone:Call your Safe Contact- someone whom you trust who will be there for you.  · Call your local CRISIS HOTLINE 116-7280 or 170-414-4364  · Call your local Children's Mobile Crisis Response Team [place] (616) 607-0813 or www.RxAdvance  · Call the toll free National Suicide Prevention Hotlines   · National Suicide Prevention Lifeline 131-801-DYFE (8505)  · National Hope Line Network 800-SUICIDE (819-9639)        Gastrointestinal Bleeding  Introduction  Gastrointestinal bleeding is bleeding somewhere along the path food travels through the body (digestive tract). This path is anywhere between the mouth and the opening of the butt (anus). You may have blood in your poop (stools) or have black poop. If you throw up (vomit), there may be blood in it.  This condition can be mild, serious, or even life-threatening. If you have a lot of bleeding, you may need to stay in the hospital.  Follow these instructions at home:  · Take over-the-counter and prescription medicines only as told by your doctor.  · Eat foods that have a lot of fiber in them. These foods include whole grains, fruits, and vegetables. You can also try eating 1-3  prunes each day.  · Drink enough fluid to keep your pee (urine) clear or pale yellow.  · Keep all follow-up visits as told by your doctor. This is important.  Contact a doctor if:  · Your symptoms do not get better.  Get help right away if:  · Your bleeding gets worse.  · You feel dizzy or you pass out (faint).  · You feel weak.  · You have very bad cramps in your back or belly (abdomen).  · You pass large clumps of blood (clots) in your poop.  · Your symptoms are getting worse.  This information is not intended to replace advice given to you by your health care provider. Make sure you discuss any questions you have with your health care provider.  Document Released: 09/26/2009 Document Revised: 05/25/2017 Document Reviewed: 06/06/2016  © 2017 [employer]      Weakness  Weakness is a lack of strength. You may feel weak all over your body or just in one part of your body. Weakness can be serious. In some cases, you may need more medical tests.  HOME CARE  · Rest.  · Eat a well-balanced diet.  · Try to exercise every day.  · Only take medicines as told by your doctor.  GET HELP RIGHT AWAY IF:   · You cannot do your normal daily activities.  · You cannot walk up and down stairs, or you feel very tired when you do so.  · You have shortness of breath or chest pain.  · You have trouble moving parts of your body.  · You have weakness in only one body part or on only one side of the body.  · You have a fever.  · You have trouble speaking or swallowing.  · You cannot control when you pee (urinate) or poop (bowel movement).  · You have black or bloody throw up (vomit) or poop.  · Your weakness gets worse or spreads to other body parts.  · You have new aches or pains.  MAKE SURE YOU:   · Understand these instructions.  · Will watch your condition.  · Will get help right away if you are not doing well or get worse.  This information is not intended to replace advice given to you by your health care provider. Make sure you discuss  any questions you have with your health care provider.  Document Released: 11/30/2009 Document Revised: 06/18/2013 Document Reviewed: 10/07/2016  [employer] Interactive Patient Education © 2017 [employer]        Nausea, Adult  Introduction  Feeling sick to your stomach (nausea) means that your stomach is upset or you feel like you have to throw up (vomit). Feeling sick to your stomach is usually not serious, but it may be an early sign of a more serious medical problem. As you feel sicker to your stomach, it can lead to throwing up (vomiting). If you throw up, or if you are not able to drink enough fluids, there is a risk of dehydration. Dehydration can make you feel tired and thirsty, have a dry mouth, and pee (urinate) less often. Older adults and people who have other diseases or a weak defense (immune) system have a higher risk of dehydration.  The main goal of treating this condition is to:  · Limit how often you feel sick to your stomach.  · Prevent throwing up and dehydration.  Follow these instructions at home:  Follow instructions from your doctor about how to care for yourself at home.  Eating and drinking  Follow these recommendations as told by your doctor:  · Take an oral rehydration solution (ORS). This is a drink that is sold at pharmacies and stores.  · Drink clear fluids in small amounts as you are able, such as:  ¨ Water.  ¨ Ice chips.  ¨ Fruit juice that has water added (diluted fruit juice).  ¨ Low-calorie sports drinks.  · Eat bland, easy to digest foods in small amounts as you are able, such as:  ¨ Bananas.  ¨ Applesauce.  ¨ Rice.  ¨ Lean meats.  ¨ Toast.  ¨ Crackers.  · Avoid drinking fluids that contain a lot of sugar or caffeine.  · Avoid alcohol.  · Avoid spicy or fatty foods.  General instructions  · Drink enough fluid to keep your pee (urine) clear or pale yellow.  · Wash your hands often. If you cannot use soap and water, use hand .  · Make sure that all people in your  household wash their hands well and often.  · Rest at home while you get better.  · Take over-the-counter and prescription medicines only as told by your doctor.  · Breathe slowly and deeply when you feel sick to your stomach.  · Watch your condition for any changes.  · Keep all follow-up visits as told by your doctor. This is important.  Contact a doctor if:  · You have a headache.  · You have new symptoms.  · You feel sicker to your stomach.  · You have a fever.  · You feel light-headed or dizzy.  · You throw up.  · You are not able to keep fluids down.  Get help right away if:  · You have pain in your chest, neck, arm, or jaw.  · You feel very weak or you pass out (faint).  · You have throw up that is bright red or looks like coffee grounds.  · You have bloody or black poop (stools), or poop that looks like tar.  · You have a very bad headache, a stiff neck, or both.  · You have very bad pain, cramping, or bloating in your belly.  · You have a rash.  · You have trouble breathing or you are breathing very quickly.  · Your heart is beating very quickly.  · Your skin feels cold and clammy.  · You feel confused.  · You have pain while peeing.  · You have signs of dehydration, such as:  ¨ Dark pee, or very little or no pee.  ¨ Cracked lips.  ¨ Dry mouth.  ¨ Sunken eyes.  ¨ Sleepiness.  ¨ Weakness.  These symptoms may be an emergency. Do not wait to see if the symptoms will go away. Get medical help right away. Call your local emergency services (911 in the [place] Do not drive yourself to the hospital.   This information is not intended to replace advice given to you by your health care provider. Make sure you discuss any questions you have with your health care provider.  Document Released: 12/06/2012 Document Revised: 05/25/2017 Document Reviewed: 08/23/2016  © 2017 [employer]

## 2019-04-19 NOTE — PROGRESS NOTES
· 2 RN skin check complete with TAY Jay.  · Devices in place Nasal cannula, SCDs, Heel float boots.  · Skin assessed under devices red and blanching.  · Confirmed pressure ulcers found on N/A.  · New potential pressure ulcers noted on N/A  · Redness to pannus.  · The following interventions in place Interdry to pannus, heel float boots, Waffle cushion, turned every 2 hours, silicone nasal cannula, alan cream, barrier wipes, pillows to float heels and for support.

## 2019-04-19 NOTE — DISCHARGE PLANNING
Anticipated Discharge Disposition: D/C to SNF    Action: LSW met with pt at bedside to go over COBRA and transfer packet, pt agreeable and signed. Packet provided to RN.     Barriers to Discharge: None.    Plan: No further d/c planning needs known at this time.

## 2019-04-19 NOTE — PROGRESS NOTES
Bedside report received from previous nurse regarding prior 12 hours.  Pt A&Ox4.  Denies pain.  White board updated.  Call light within reach.

## 2019-04-20 NOTE — PROGRESS NOTES
Pt dc'd to [facility]. IV and monitor removed; monitor room notified. Pt left unit via wheelchair with escort. Personal belongings with pt when leaving unit. Pt ans escort given [employer] packet and discharge instructions prior to leaving unit including prescription and when to visit with physician; verbalizes understanding. Copy of discharge instructions with pt and in the chart.

## 2019-05-01 NOTE — DOCUMENTATION QUERY
[facility]                                                                       Query Response Note      PATIENT:               MANDI DUDLEY  ACCT #:                  [id number]  MRN:                     [id number]  :                      1940  ADMIT DATE:       2019 5:40 PM  DISCH DATE:        2019 5:45 PM  RESPONDING  PROVIDER #:        [id number]           QUERY TEXT:    Depression is documented in the Medical Record.  Please specify the type.    NOTE:  If an appropriate response is not listed below, please respond with a new note.              The patient's Clinical Indicators include:  depression    Treatment:  wellbutrin    Query created by: Shelly Mcclelland on 2019 2:18 AM    RESPONSE TEXT:    MDD, recurrent, in full remission          Electronically signed by:  GONZALES RM MD 2019 11:00 AM

## 2019-05-07 ENCOUNTER — OFFICE VISIT (OUTPATIENT)
Dept: CARDIOLOGY | Facility: MEDICAL CENTER | Age: 79
End: 2019-05-07
Payer: MEDICARE

## 2019-05-07 VITALS
HEART RATE: 80 BPM | HEIGHT: 60 IN | DIASTOLIC BLOOD PRESSURE: 66 MMHG | OXYGEN SATURATION: 96 % | BODY MASS INDEX: 38.02 KG/M2 | SYSTOLIC BLOOD PRESSURE: 114 MMHG

## 2019-05-07 DIAGNOSIS — I50.9 HEART FAILURE, NYHA CLASS 2 (HCC): ICD-10-CM

## 2019-05-07 DIAGNOSIS — I48.19 PERSISTENT ATRIAL FIBRILLATION (HCC): ICD-10-CM

## 2019-05-07 DIAGNOSIS — D50.0 ANEMIA DUE TO GASTROINTESTINAL BLOOD LOSS: ICD-10-CM

## 2019-05-07 DIAGNOSIS — I50.20 ACC/AHA STAGE C SYSTOLIC HEART FAILURE (HCC): ICD-10-CM

## 2019-05-07 DIAGNOSIS — E78.2 MIXED HYPERLIPIDEMIA: ICD-10-CM

## 2019-05-07 PROCEDURE — 99214 OFFICE O/P EST MOD 30 MIN: CPT | Performed by: NURSE PRACTITIONER

## 2019-05-07 RX ORDER — METOPROLOL SUCCINATE 25 MG/1
25 TABLET, EXTENDED RELEASE ORAL DAILY
Qty: 30 TAB | Refills: 11 | Status: SHIPPED | OUTPATIENT
Start: 2019-05-07

## 2019-05-07 ASSESSMENT — MINNESOTA LIVING WITH HEART FAILURE QUESTIONNAIRE (MLHF)
HAVING TO SIT OR LIE DOWN DURING THE DAY: 0
DIFFICULTY WITH RECREATIONAL PASTIMES, SPORTS, HOBBIES: 0
TIRED, FATIGUED OR LOW ON ENERGY: 5
TOTAL_SCORE: 56
GIVING YOU SIDE EFFECTS FROM TREATMENTS: 4
DIFFICULTY SLEEPING WELL AT NIGHT: 5
WORKING AROUND THE HOUSE OR YARD DIFFICULT: 4
DIFFICULTY GOING AWAY FROM HOME: 5
MAKING YOU FEEL DEPRESSED: 4
MAKING YOU STAY IN A HOSPITAL: 5
DIFFICULTY WITH SEXUAL ACTIVITIES: 0
EATING LESS FOODS YOU LIKE: 5
MAKING YOU WORRY: 3
FEELING LIKE A BURDEN TO FAMILY AND FRIENDS: 0
COSTING YOU MONEY FOR MEDICAL CARE: 0
LOSS OF SELF CONTROL IN YOUR LIFE: 3
DIFFICULTY TO CONCENTRATE OR REMEMBERING THINGS: 3
MAKING YOU SHORT OF BREATH: 0
DIFFICULTY SOCIALIZING WITH FAMILY OR FRIENDS: 5
SWELLING IN ANKLES OR LEGS: 5
DIFFICULTY WORKING TO EARN A LIVING: 0
WALKING ABOUT OR CLIMBING STAIRS DIFFICULT: 0

## 2019-05-07 ASSESSMENT — ENCOUNTER SYMPTOMS
MYALGIAS: 0
ABDOMINAL PAIN: 0
PALPITATIONS: 0
FEVER: 0
SHORTNESS OF BREATH: 1
CLAUDICATION: 0
PND: 0
ORTHOPNEA: 1
DIZZINESS: 0
SPUTUM PRODUCTION: 0
COUGH: 1

## 2019-05-07 ASSESSMENT — 6 MINUTE WALK TEST (6MWT): TOTAL DISTANCE WALKED (METERS): 0

## 2019-05-07 NOTE — PATIENT INSTRUCTIONS
Follow up with GI Doctor for bleeding  Daily weights once at home, call office if weight increasing, greater than 3 lbs in 1 day or greater than 5 lbs in 1 week.   Restart Toprol XL 25 mg daily, hold  For SBP< 90 or symptoms  Monitor Blood pressures at home

## 2019-05-07 NOTE — PROGRESS NOTES
Chief Complaint   Patient presents with   • CHF (Systolic)     HF New       Subjective:   Rhoda Gaines is a 79 y.o. female who presents today for follow-up on her heart failure.    New patient to the office.  Patient was recently hospitalized from 4/11/2019 through 4/19/2019.  Patient was transferred from [facility] for GI bleed.  Patient was recently treated at the ER alcohol for heart failure and A. fib and was started on anticoagulation.  Patient was found to have anemia, was given transfusion.  Patient was treated for H. pylori.  Her hemoglobin hematocrit stabilized.  Patient was found to have an EF of 25% with moderate mitral regurg.  Cardiology was consulted.  Her medications were optimized.  Patient recommended to follow-up with GI for colonoscopy and to have ischemic work-up as outpatient.    Patient was transferred to nursing facility.  She was discharged home on metoprolol SR and her other cardiac meds were held due to borderline blood pressure.    For her symptoms, she reports fatigue, orthopnea, dry cough and shortness of breath.  She does use continuous oxygen at 2 L.  She denies chest pain, palpitations, edema, PND or dizziness/lightheadedness.    Patient reports she ambulates around the facility with a walker.  Patient states she is going to get discharged home back to [facility] tomorrow.  Patient is unsure how she is and get a follow-up care.  She states her brother lives here in town but is not willing to move to [place].    Patient is planning on following up with the PCP when she gets home.    Patient is not getting weighed daily.    Upon reviewing her medications from her facility, her Toprol-XL was discontinued due to low blood pressure.  Patient denies any symptoms when she had low blood pressure she was just told that they discontinue the medication.    Additonally, patient has the following medical problems:    -Emphysema, using continuous oxygen at 2 L and inhalers    -A. fib, not currently on OAC due  to GI bleed    -Obesity    -Hypothyroidism: Taking levothyroxine    -Depression: Taking Wellbutrin  Past Medical History:   Diagnosis Date   • A-fib (HCC)    • Atrial fibrillation (HCC)    • CHF (congestive heart failure) (HCC)    • Chronic obstructive pulmonary disease (HCC)    • Hypertension    • Obesity    • Psychiatric disorder     depression   • Pulmonary disease     2 L oxygen   • Thyroid disease      Past Surgical History:   Procedure Laterality Date   • GASTROSCOPY-ENDO N/A 4/12/2019    Procedure: GASTROSCOPY;  Surgeon: Bradly Do M.D.;  Location: SURGERY [facility];  Service: Gastroenterology   • KNEE REPLACEMENT, TOTAL Bilateral    • OTHER ORTHOPEDIC SURGERY      knee     Family History   Problem Relation Age of Onset   • Diabetes Father    • Lung Cancer Sister      Social History     Social History   • Marital status: Single     Spouse name: N/A   • Number of children: N/A   • Years of education: N/A     Occupational History   • Not on file.     Social History Main Topics   • Smoking status: Former Smoker     Packs/day: 2.00     Years: 50.00     Quit date: 5/7/2005   • Smokeless tobacco: Never Used   • Alcohol use No      Comment: heavy alcohol use for 30 years   • Drug use: No   • Sexual activity: Not on file     Other Topics Concern   • Not on file     Social History Narrative   • No narrative on file     No Known Allergies  Outpatient Encounter Prescriptions as of 5/7/2019   Medication Sig Dispense Refill   • metoprolol SR (TOPROL XL) 25 MG TABLET SR 24 HR Take 1.5 Tabs by mouth 2 Times a Day. 30 Tab 0   • furosemide (LASIX) 40 MG Tab Take 1 Tab by mouth every day. 30 Tab 0   • digoxin (LANOXIN) 125 MCG Tab Take 1 Tab by mouth every day at 6 PM. 30 Tab 0   • multivitamin (THERAGRAN) Tab Take 1 Tab by mouth every day. 30 Tab 0   • potassium chloride SA (KDUR) 20 MEQ Tab CR Take 2 Tabs by mouth every day. 60 Tab 3   • levothyroxine (SYNTHROID) 50 MCG Tab Take 50 mcg by mouth Every morning on an  empty stomach.     • budesonide-formoterol (SYMBICORT) 80-4.5 MCG/ACT Aerosol Inhale 1 Puff by mouth 2 Times a Day.     • buPROPion (WELLBUTRIN SR) 200 MG SR tablet Take 200 mg by mouth 2 times a day.     • atorvastatin (LIPITOR) 20 MG Tab Take 20 mg by mouth every evening.     • polyethylene glycol/lytes (MIRALAX) Pack Take 17 g by mouth 1 time daily as needed (Constipation).     • omeprazole (PRILOSEC) 20 MG delayed-release capsule Take 20 mg by mouth every day.     • ipratropium-albuterol (DUONEB) 0.5-2.5 (3) MG/3ML nebulizer solution 3 mL by Nebulization route 4 times a day.     • albuterol 108 (90 Base) MCG/ACT Aero Soln inhalation aerosol Inhale 2 Puffs by mouth every 6 hours as needed for Shortness of Breath.     • docusate sodium (COLACE) 100 MG Cap Take 100 mg by mouth 2 times a day as needed for Constipation.       No facility-administered encounter medications on file as of 5/7/2019.      Review of Systems   Constitutional: Positive for malaise/fatigue. Negative for fever.   Respiratory: Positive for cough (dry) and shortness of breath. Negative for sputum production.    Cardiovascular: Positive for orthopnea. Negative for chest pain, palpitations, claudication, leg swelling and PND.   Gastrointestinal: Negative for abdominal pain.   Musculoskeletal: Negative for myalgias.   Neurological: Negative for dizziness.   All other systems reviewed and are negative.       Objective:   /66 (BP Location: Right arm, Patient Position: Sitting, BP Cuff Size: Adult)   Pulse 80   Ht 1.524 m (5')   SpO2 96%   BMI 38.02 kg/m²     Physical Exam   Constitutional: She is oriented to person, place, and time. She appears well-developed and well-nourished.   In wheelchair today, uses a walker at facility   HENT:   Head: Normocephalic and atraumatic.   Eyes: Pupils are equal, round, and reactive to light. EOM are normal.   Neck: Normal range of motion. Neck supple. No JVD present.   Cardiovascular: Normal rate, regular  rhythm and normal heart sounds.    Pulmonary/Chest: Effort normal and breath sounds normal. No respiratory distress. She has no wheezes. She has no rales.   Using continuous oxygen at 2 L. Diminished breath sounds throughout lung fields   Abdominal: Soft. Bowel sounds are normal.   Musculoskeletal: She exhibits edema (bilateral 1+ LE edema).   Neurological: She is alert and oriented to person, place, and time.   Skin: Skin is warm and dry.   Psychiatric: She has a normal mood and affect. Her behavior is normal.   Vitals reviewed.    Lab Results   Component Value Date/Time    CHOLSTRLTOT 66 (L) 04/16/2019 02:14 AM    LDL 20 04/16/2019 02:14 AM    HDL 34 (A) 04/16/2019 02:14 AM    TRIGLYCERIDE 62 04/16/2019 02:14 AM       Lab Results   Component Value Date/Time    SODIUM 141 04/18/2019 10:02 AM    POTASSIUM 4.3 04/18/2019 10:02 AM    CHLORIDE 104 04/18/2019 10:02 AM    CO2 30 04/18/2019 10:02 AM    GLUCOSE 109 (H) 04/18/2019 10:02 AM    BUN 17 04/18/2019 10:02 AM    CREATININE 1.12 04/18/2019 10:02 AM     Lab Results   Component Value Date/Time    ALKPHOSPHAT 63 04/18/2019 10:02 AM    ASTSGOT 21 04/18/2019 10:02 AM    ALTSGPT 14 04/18/2019 10:02 AM    TBILIRUBIN 0.5 04/18/2019 10:02 AM      Transthoracic Echo Report 4/12/2019  No prior study is available for comparison.   Severely reduced left ventricular systolic function.  Mild concentric left ventricular hypertrophy.  Mildly dilated left atrium.  Moderate mitral regurgitation.  Estimated right ventricular systolic pressure  is 40 mmHg.    Transthoracic Echo Report 4/14/2019  Severely reduced left ventricular systolic function.  Left ventricular ejection fraction is visually estimated to be 25%.  Global hypokinesis with regional variation.  Normal right ventricular size and systolic function.  Normal pericardium without effusion.    Assessment:     1. ACC/AHA stage C systolic heart failure (HCC)  metoprolol SR (TOPROL XL) 25 MG TABLET SR 24 HR   2. Heart failure,  NYHA class 2 (HCC)  metoprolol SR (TOPROL XL) 25 MG TABLET SR 24 HR   3. Persistent atrial fibrillation (HCC)     4. Mixed hyperlipidemia     5. Anemia due to gastrointestinal blood loss         Medical Decision Making:  Today's Assessment / Status / Plan:   1. HFrEF, Stage C, Class 2-3, LVEF 25%: Patient does have some mild lower extremity edema.  Patient has a difficult social situation.  Patient returning home to [place], but will have difficulty obtaining follow-up care with cardiology.  Patient states she is unable to get to [place] or [place] for appointments.  Patient does have a pending appointment with her PCP.  Discussed with patient her prognosis is poor she does not get adequate treatment for heart failure.  Patient verbalizes understanding and states she is aware of her prognosis.  Did discuss hospice if needed in the future for worsening symptoms.  -Restart Toprol-XL 25 mg daily, hold for BP less than 90, systolic or symptoms.  -Continue furosemide 40 mg daily  -Continue digoxin 125 mcg daily  -Continue potassium 40 mEq daily  -Consider starting ACE/ARB and spironolactone once her beta-blocker optimized  -Patient needs ischemic work-up once cleared by GI for her GI bleed  -Reinforced s/sx of worsening heart failure with patient and weight monitoring. Pt verbalizes understanding. Pt to call office or RTC if present.   -Patient to monitor weights at home daily. Pt to call office if weight increasing >3 lbs in 1 day or >5 lbs in 1 week.     -Will forward note over to PCP for management of her heart failure at this time, perhaps patient may establish with a traveling cardiology in [place] if available.    2.  Atrial fibrillation: Rate controlled  -No OAC at this time due to anemia and GI bleed  -Restart Toprol per above  -Continue digoxin 125 mcg daily    3.  Anemia:  -Follow-up with GI/PCP  -Patient needs outpatient colonoscopy    4. HLD: Last LDL 28 on 4/16/2019  -Continue atorvastatin 20 mg  nightly    Discussed with patient that a follow-up in 2 weeks with heart failure cardiologist recommended, patient states she will likely not be able to follow-up due to transportation.  Discussed with patient to follow-up with PCP as soon as she gets home. Sooner if needed.    Patient verbalizes understanding and agrees with the plan of care.     Collaborating MD: Antoine Tolentino MD

## 2021-01-13 DIAGNOSIS — Z23 NEED FOR VACCINATION: ICD-10-CM

## (undated) DEVICE — FILM CASSETTE ENDO

## (undated) DEVICE — FORCEP RADIAL JAW 4 STANDARD CAPACITY W/NEEDLE 240CM (40EA/BX)

## (undated) DEVICE — BITE BLOCK ADULT 60FR (100EA/CA)

## (undated) DEVICE — SUCTION INSTRUMENT YANKAUER BULBOUS TIP W/O VENT (50EA/CA)

## (undated) DEVICE — CONTAINER, SPECIMEN, STERILE

## (undated) DEVICE — KIT CUSTOM PROCEDURE SINGLE FOR ENDO  (15/CA)